# Patient Record
Sex: MALE | Race: ASIAN | NOT HISPANIC OR LATINO | URBAN - METROPOLITAN AREA
[De-identification: names, ages, dates, MRNs, and addresses within clinical notes are randomized per-mention and may not be internally consistent; named-entity substitution may affect disease eponyms.]

---

## 2017-01-06 ENCOUNTER — EMERGENCY (EMERGENCY)
Facility: HOSPITAL | Age: 77
LOS: 1 days | Discharge: HOME | End: 2017-01-06
Admitting: EMERGENCY MEDICINE
Payer: MEDICARE

## 2017-01-06 PROCEDURE — 82553 CREATINE MB FRACTION: CPT

## 2017-01-06 PROCEDURE — 93005 ELECTROCARDIOGRAM TRACING: CPT

## 2017-01-06 PROCEDURE — 93010 ELECTROCARDIOGRAM REPORT: CPT

## 2017-01-06 PROCEDURE — 83880 ASSAY OF NATRIURETIC PEPTIDE: CPT

## 2017-01-06 PROCEDURE — 85730 THROMBOPLASTIN TIME PARTIAL: CPT

## 2017-01-06 PROCEDURE — 71020: CPT | Mod: 26

## 2017-01-06 PROCEDURE — 99284 EMERGENCY DEPT VISIT MOD MDM: CPT

## 2017-01-06 PROCEDURE — 99284 EMERGENCY DEPT VISIT MOD MDM: CPT | Mod: 25

## 2017-01-06 PROCEDURE — 71046 X-RAY EXAM CHEST 2 VIEWS: CPT

## 2017-01-06 PROCEDURE — 85610 PROTHROMBIN TIME: CPT

## 2017-01-06 PROCEDURE — 85027 COMPLETE CBC AUTOMATED: CPT

## 2017-01-06 PROCEDURE — 93010 ELECTROCARDIOGRAM REPORT: CPT | Mod: 77,76

## 2017-01-06 PROCEDURE — 81001 URINALYSIS AUTO W/SCOPE: CPT

## 2017-01-06 PROCEDURE — 84484 ASSAY OF TROPONIN QUANT: CPT

## 2017-01-06 PROCEDURE — 99285 EMERGENCY DEPT VISIT HI MDM: CPT

## 2017-01-06 PROCEDURE — 82550 ASSAY OF CK (CPK): CPT

## 2017-01-06 PROCEDURE — 80053 COMPREHEN METABOLIC PANEL: CPT

## 2017-01-13 ENCOUNTER — NON-APPOINTMENT (OUTPATIENT)
Age: 77
End: 2017-01-13

## 2017-01-13 ENCOUNTER — APPOINTMENT (OUTPATIENT)
Dept: CARDIOLOGY | Facility: CLINIC | Age: 77
End: 2017-01-13

## 2017-01-13 VITALS
BODY MASS INDEX: 21.21 KG/M2 | SYSTOLIC BLOOD PRESSURE: 145 MMHG | DIASTOLIC BLOOD PRESSURE: 66 MMHG | WEIGHT: 132 LBS | HEIGHT: 66 IN | HEART RATE: 60 BPM

## 2017-01-13 RX ORDER — METOPROLOL SUCCINATE 25 MG/1
25 TABLET, EXTENDED RELEASE ORAL
Qty: 1 | Refills: 1 | Status: ACTIVE | COMMUNITY
Start: 1900-01-01 | End: 1900-01-01

## 2017-02-02 ENCOUNTER — EMERGENCY (EMERGENCY)
Facility: HOSPITAL | Age: 77
LOS: 1 days | Discharge: HOME | End: 2017-02-02
Admitting: EMERGENCY MEDICINE
Payer: MEDICARE

## 2017-02-02 LAB
ALBUMIN SERPL ELPH-MCNC: 3.8 G/DL — SIGNIFICANT CHANGE UP (ref 3.3–5)
ALP SERPL-CCNC: 54 U/L — SIGNIFICANT CHANGE UP (ref 30–120)
ALT FLD-CCNC: <10 U/L DA — LOW (ref 10–60)
ANION GAP SERPL CALC-SCNC: 5 MMOL/L — SIGNIFICANT CHANGE UP (ref 5–17)
APTT BLD: 31 SEC — SIGNIFICANT CHANGE UP (ref 27.5–37.4)
AST SERPL-CCNC: 22 U/L — SIGNIFICANT CHANGE UP (ref 10–40)
BASOPHILS # BLD AUTO: 0.1 K/UL — SIGNIFICANT CHANGE UP (ref 0–0.2)
BASOPHILS NFR BLD AUTO: 1 % — SIGNIFICANT CHANGE UP (ref 0–2)
BILIRUB SERPL-MCNC: 0.7 MG/DL — SIGNIFICANT CHANGE UP (ref 0.2–1.2)
BUN SERPL-MCNC: 15 MG/DL — SIGNIFICANT CHANGE UP (ref 7–23)
CALCIUM SERPL-MCNC: 9.3 MG/DL — SIGNIFICANT CHANGE UP (ref 8.4–10.5)
CHLORIDE SERPL-SCNC: 99 MMOL/L — SIGNIFICANT CHANGE UP (ref 96–108)
CK MB CFR SERPL CALC: 0.6 NG/ML — SIGNIFICANT CHANGE UP (ref 0–3.6)
CK SERPL-CCNC: 93 U/L — SIGNIFICANT CHANGE UP (ref 39–308)
CO2 SERPL-SCNC: 32 MMOL/L — HIGH (ref 22–31)
CREAT SERPL-MCNC: 1.21 MG/DL — SIGNIFICANT CHANGE UP (ref 0.5–1.3)
EOSINOPHIL # BLD AUTO: 0.4 K/UL — SIGNIFICANT CHANGE UP (ref 0–0.5)
EOSINOPHIL NFR BLD AUTO: 4.4 % — SIGNIFICANT CHANGE UP (ref 0–6)
FT4I SERPL CALC-MCNC: 3 FTI% — SIGNIFICANT CHANGE UP (ref 1.4–4.8)
FT4I SERPL CALC-MCNC: 8.8 INDEX — SIGNIFICANT CHANGE UP (ref 4.6–15.4)
GLUCOSE SERPL-MCNC: 263 MG/DL — HIGH (ref 70–99)
HCT VFR BLD CALC: 38.7 % — LOW (ref 39–50)
HGB BLD-MCNC: 12.6 G/DL — LOW (ref 13–17)
INR BLD: 1.18 RATIO — HIGH (ref 0.88–1.16)
LYMPHOCYTES # BLD AUTO: 3.6 K/UL — HIGH (ref 1–3.3)
LYMPHOCYTES # BLD AUTO: 40.5 % — SIGNIFICANT CHANGE UP (ref 13–44)
MCHC RBC-ENTMCNC: 28 PG — SIGNIFICANT CHANGE UP (ref 27–34)
MCHC RBC-ENTMCNC: 32.7 GM/DL — SIGNIFICANT CHANGE UP (ref 32–36)
MCV RBC AUTO: 85.6 FL — SIGNIFICANT CHANGE UP (ref 80–100)
MONOCYTES # BLD AUTO: 1 K/UL — HIGH (ref 0–0.9)
MONOCYTES NFR BLD AUTO: 11.2 % — SIGNIFICANT CHANGE UP (ref 2–14)
NEUTROPHILS # BLD AUTO: 3.8 K/UL — SIGNIFICANT CHANGE UP (ref 1.8–7.4)
NEUTROPHILS NFR BLD AUTO: 42.9 % — LOW (ref 43–77)
PLATELET # BLD AUTO: 271 K/UL — SIGNIFICANT CHANGE UP (ref 150–400)
POTASSIUM SERPL-MCNC: 4.7 MMOL/L — SIGNIFICANT CHANGE UP (ref 3.5–5.3)
POTASSIUM SERPL-SCNC: 4.7 MMOL/L — SIGNIFICANT CHANGE UP (ref 3.5–5.3)
PROT SERPL-MCNC: 7.2 G/DL — SIGNIFICANT CHANGE UP (ref 6–8.3)
PROTHROM AB SERPL-ACNC: 13.2 SEC — HIGH (ref 10–13.1)
RBC # BLD: 4.52 M/UL — SIGNIFICANT CHANGE UP (ref 4.2–5.8)
RBC # FLD: 12.4 % — SIGNIFICANT CHANGE UP (ref 10.3–14.5)
SODIUM SERPL-SCNC: 136 MMOL/L — SIGNIFICANT CHANGE UP (ref 135–145)
T3 SERPL-MCNC: 98 NG/DL — SIGNIFICANT CHANGE UP (ref 80–200)
T3/T3 UPTAKE INDEX SERPL-RTO: 39 % — SIGNIFICANT CHANGE UP (ref 24–39)
T4 AB SER-ACNC: 7.7 UG/DL — SIGNIFICANT CHANGE UP (ref 4.6–12)
T4 FREE SERPL-MCNC: 1.7 NG/DL — SIGNIFICANT CHANGE UP (ref 0.9–1.8)
T4/T3 UPTAKE INDEX SERPL: 0.87 INDEX — SIGNIFICANT CHANGE UP (ref 0.8–1.3)
TROPONIN I SERPL-MCNC: 0 NG/ML — LOW (ref 0.02–0.06)
TSH SERPL-MCNC: 0.05 UIU/ML — LOW (ref 0.27–4.2)
WBC # BLD: 8.8 K/UL — SIGNIFICANT CHANGE UP (ref 3.8–10.5)
WBC # FLD AUTO: 8.8 K/UL — SIGNIFICANT CHANGE UP (ref 3.8–10.5)

## 2017-02-02 PROCEDURE — 85027 COMPLETE CBC AUTOMATED: CPT

## 2017-02-02 PROCEDURE — 99284 EMERGENCY DEPT VISIT MOD MDM: CPT

## 2017-02-02 PROCEDURE — 85610 PROTHROMBIN TIME: CPT

## 2017-02-02 PROCEDURE — 82553 CREATINE MB FRACTION: CPT

## 2017-02-02 PROCEDURE — 84436 ASSAY OF TOTAL THYROXINE: CPT

## 2017-02-02 PROCEDURE — 71045 X-RAY EXAM CHEST 1 VIEW: CPT

## 2017-02-02 PROCEDURE — 84479 ASSAY OF THYROID (T3 OR T4): CPT

## 2017-02-02 PROCEDURE — 84443 ASSAY THYROID STIM HORMONE: CPT

## 2017-02-02 PROCEDURE — 99284 EMERGENCY DEPT VISIT MOD MDM: CPT | Mod: 25

## 2017-02-02 PROCEDURE — 84480 ASSAY TRIIODOTHYRONINE (T3): CPT

## 2017-02-02 PROCEDURE — 93005 ELECTROCARDIOGRAM TRACING: CPT

## 2017-02-02 PROCEDURE — 85730 THROMBOPLASTIN TIME PARTIAL: CPT

## 2017-02-02 PROCEDURE — 93010 ELECTROCARDIOGRAM REPORT: CPT

## 2017-02-02 PROCEDURE — 71010: CPT | Mod: 26

## 2017-02-02 PROCEDURE — 84439 ASSAY OF FREE THYROXINE: CPT

## 2017-02-02 PROCEDURE — 82550 ASSAY OF CK (CPK): CPT

## 2017-02-02 PROCEDURE — 80053 COMPREHEN METABOLIC PANEL: CPT

## 2017-02-02 PROCEDURE — 84484 ASSAY OF TROPONIN QUANT: CPT

## 2017-02-03 ENCOUNTER — NON-APPOINTMENT (OUTPATIENT)
Age: 77
End: 2017-02-03

## 2017-02-03 ENCOUNTER — APPOINTMENT (OUTPATIENT)
Dept: CARDIOLOGY | Facility: CLINIC | Age: 77
End: 2017-02-03

## 2017-02-03 VITALS
SYSTOLIC BLOOD PRESSURE: 160 MMHG | WEIGHT: 132 LBS | HEIGHT: 66 IN | BODY MASS INDEX: 21.21 KG/M2 | DIASTOLIC BLOOD PRESSURE: 70 MMHG | HEART RATE: 60 BPM | OXYGEN SATURATION: 100 %

## 2017-02-28 ENCOUNTER — APPOINTMENT (OUTPATIENT)
Dept: CARDIOLOGY | Facility: CLINIC | Age: 77
End: 2017-02-28

## 2017-05-18 ENCOUNTER — NON-APPOINTMENT (OUTPATIENT)
Age: 77
End: 2017-05-18

## 2017-05-18 ENCOUNTER — APPOINTMENT (OUTPATIENT)
Dept: CARDIOLOGY | Facility: CLINIC | Age: 77
End: 2017-05-18

## 2017-05-18 VITALS
HEART RATE: 60 BPM | DIASTOLIC BLOOD PRESSURE: 68 MMHG | WEIGHT: 134 LBS | HEIGHT: 66 IN | SYSTOLIC BLOOD PRESSURE: 145 MMHG | BODY MASS INDEX: 21.53 KG/M2

## 2017-08-31 ENCOUNTER — APPOINTMENT (OUTPATIENT)
Dept: CARDIOLOGY | Facility: CLINIC | Age: 77
End: 2017-08-31
Payer: MEDICARE

## 2017-08-31 ENCOUNTER — NON-APPOINTMENT (OUTPATIENT)
Age: 77
End: 2017-08-31

## 2017-08-31 VITALS
OXYGEN SATURATION: 95 % | DIASTOLIC BLOOD PRESSURE: 54 MMHG | HEIGHT: 66 IN | SYSTOLIC BLOOD PRESSURE: 91 MMHG | HEART RATE: 58 BPM | BODY MASS INDEX: 21.53 KG/M2 | WEIGHT: 134 LBS

## 2017-08-31 VITALS — SYSTOLIC BLOOD PRESSURE: 100 MMHG | DIASTOLIC BLOOD PRESSURE: 50 MMHG

## 2017-08-31 DIAGNOSIS — I10 ESSENTIAL (PRIMARY) HYPERTENSION: ICD-10-CM

## 2017-08-31 PROCEDURE — 99214 OFFICE O/P EST MOD 30 MIN: CPT | Mod: 25

## 2017-08-31 PROCEDURE — 93000 ELECTROCARDIOGRAM COMPLETE: CPT

## 2017-09-12 ENCOUNTER — APPOINTMENT (OUTPATIENT)
Dept: CARDIOLOGY | Facility: CLINIC | Age: 77
End: 2017-09-12
Payer: MEDICARE

## 2017-09-12 PROCEDURE — 93280 PM DEVICE PROGR EVAL DUAL: CPT

## 2017-12-19 ENCOUNTER — APPOINTMENT (OUTPATIENT)
Dept: CARDIOLOGY | Facility: CLINIC | Age: 77
End: 2017-12-19
Payer: MEDICARE

## 2017-12-19 PROCEDURE — 93280 PM DEVICE PROGR EVAL DUAL: CPT

## 2017-12-22 ENCOUNTER — NON-APPOINTMENT (OUTPATIENT)
Age: 77
End: 2017-12-22

## 2017-12-22 ENCOUNTER — APPOINTMENT (OUTPATIENT)
Dept: CARDIOLOGY | Facility: CLINIC | Age: 77
End: 2017-12-22
Payer: MEDICARE

## 2017-12-22 VITALS
SYSTOLIC BLOOD PRESSURE: 152 MMHG | WEIGHT: 130 LBS | HEIGHT: 66 IN | DIASTOLIC BLOOD PRESSURE: 75 MMHG | BODY MASS INDEX: 20.89 KG/M2 | HEART RATE: 60 BPM

## 2017-12-22 DIAGNOSIS — E11.9 TYPE 2 DIABETES MELLITUS W/OUT COMPLICATIONS: ICD-10-CM

## 2017-12-22 DIAGNOSIS — I63.9 CEREBRAL INFARCTION, UNSPECIFIED: ICD-10-CM

## 2017-12-22 PROCEDURE — 99214 OFFICE O/P EST MOD 30 MIN: CPT | Mod: 25

## 2017-12-22 PROCEDURE — 93000 ELECTROCARDIOGRAM COMPLETE: CPT

## 2018-03-20 ENCOUNTER — APPOINTMENT (OUTPATIENT)
Dept: CARDIOLOGY | Facility: CLINIC | Age: 78
End: 2018-03-20

## 2018-04-17 ENCOUNTER — NON-APPOINTMENT (OUTPATIENT)
Age: 78
End: 2018-04-17

## 2018-04-17 ENCOUNTER — APPOINTMENT (OUTPATIENT)
Dept: CARDIOLOGY | Facility: CLINIC | Age: 78
End: 2018-04-17
Payer: MEDICARE

## 2018-04-17 VITALS — SYSTOLIC BLOOD PRESSURE: 154 MMHG | OXYGEN SATURATION: 98 % | DIASTOLIC BLOOD PRESSURE: 73 MMHG | HEART RATE: 58 BPM

## 2018-04-17 VITALS — BODY MASS INDEX: 22.02 KG/M2 | WEIGHT: 137 LBS | HEIGHT: 66 IN

## 2018-04-17 DIAGNOSIS — R55 SYNCOPE AND COLLAPSE: ICD-10-CM

## 2018-04-17 DIAGNOSIS — I95.1 ORTHOSTATIC HYPOTENSION: ICD-10-CM

## 2018-04-17 DIAGNOSIS — I25.10 ATHEROSCLEROTIC HEART DISEASE OF NATIVE CORONARY ARTERY W/OUT ANGINA PECTORIS: ICD-10-CM

## 2018-04-17 DIAGNOSIS — G20 PARKINSON'S DISEASE: ICD-10-CM

## 2018-04-17 PROCEDURE — 93000 ELECTROCARDIOGRAM COMPLETE: CPT

## 2018-04-17 PROCEDURE — 99214 OFFICE O/P EST MOD 30 MIN: CPT | Mod: 25

## 2018-06-20 ENCOUNTER — APPOINTMENT (OUTPATIENT)
Dept: CARDIOLOGY | Facility: CLINIC | Age: 78
End: 2018-06-20
Payer: MEDICARE

## 2018-06-20 DIAGNOSIS — I47.2 VENTRICULAR TACHYCARDIA: ICD-10-CM

## 2018-06-20 PROCEDURE — 93280 PM DEVICE PROGR EVAL DUAL: CPT

## 2018-06-25 LAB
ALBUMIN SERPL ELPH-MCNC: 4 G/DL
ALP BLD-CCNC: 44 U/L
ALT SERPL-CCNC: 7 U/L
ANION GAP SERPL CALC-SCNC: 17 MMOL/L
AST SERPL-CCNC: 21 U/L
BASOPHILS # BLD AUTO: 0.04 K/UL
BASOPHILS NFR BLD AUTO: 0.5 %
BILIRUB SERPL-MCNC: 0.6 MG/DL
BUN SERPL-MCNC: 15 MG/DL
CALCIUM SERPL-MCNC: 9.3 MG/DL
CHLORIDE SERPL-SCNC: 97 MMOL/L
CO2 SERPL-SCNC: 21 MMOL/L
CREAT SERPL-MCNC: 0.99 MG/DL
EOSINOPHIL # BLD AUTO: 0.26 K/UL
EOSINOPHIL NFR BLD AUTO: 3 %
GLUCOSE SERPL-MCNC: 327 MG/DL
HCT VFR BLD CALC: 41.4 %
HGB BLD-MCNC: 13.3 G/DL
IMM GRANULOCYTES NFR BLD AUTO: 0.2 %
LYMPHOCYTES # BLD AUTO: 3.05 K/UL
LYMPHOCYTES NFR BLD AUTO: 35.4 %
MAGNESIUM SERPL-MCNC: 1.7 MG/DL
MAN DIFF?: NORMAL
MCHC RBC-ENTMCNC: 27.3 PG
MCHC RBC-ENTMCNC: 32.1 GM/DL
MCV RBC AUTO: 85 FL
MONOCYTES # BLD AUTO: 0.6 K/UL
MONOCYTES NFR BLD AUTO: 7 %
NEUTROPHILS # BLD AUTO: 4.65 K/UL
NEUTROPHILS NFR BLD AUTO: 53.9 %
PLATELET # BLD AUTO: 318 K/UL
POTASSIUM SERPL-SCNC: 4.3 MMOL/L
PROT SERPL-MCNC: 7.4 G/DL
RBC # BLD: 4.87 M/UL
RBC # FLD: 13.8 %
SODIUM SERPL-SCNC: 135 MMOL/L
T3 SERPL-MCNC: 92 NG/DL
T4 SERPL-MCNC: 8.1 UG/DL
TSH SERPL-ACNC: 0.06 UIU/ML
WBC # FLD AUTO: 8.62 K/UL

## 2018-06-28 ENCOUNTER — APPOINTMENT (OUTPATIENT)
Dept: CARDIOLOGY | Facility: CLINIC | Age: 78
End: 2018-06-28
Payer: MEDICARE

## 2018-06-28 PROCEDURE — 93306 TTE W/DOPPLER COMPLETE: CPT

## 2018-07-19 ENCOUNTER — APPOINTMENT (OUTPATIENT)
Dept: CARDIOLOGY | Facility: CLINIC | Age: 78
End: 2018-07-19
Payer: MEDICARE

## 2018-07-19 LAB — GLUCOSE SERPL-MCNC: 354

## 2018-07-19 PROCEDURE — A9500: CPT

## 2018-07-19 PROCEDURE — 93015 CV STRESS TEST SUPVJ I&R: CPT

## 2018-07-19 PROCEDURE — 78452 HT MUSCLE IMAGE SPECT MULT: CPT

## 2018-07-26 ENCOUNTER — APPOINTMENT (OUTPATIENT)
Dept: CARDIOLOGY | Facility: CLINIC | Age: 78
End: 2018-07-26

## 2018-07-31 ENCOUNTER — APPOINTMENT (OUTPATIENT)
Dept: CARDIOLOGY | Facility: CLINIC | Age: 78
End: 2018-07-31

## 2018-09-24 ENCOUNTER — EMERGENCY (EMERGENCY)
Facility: HOSPITAL | Age: 78
LOS: 1 days | Discharge: HOME | End: 2018-09-24
Attending: EMERGENCY MEDICINE | Admitting: EMERGENCY MEDICINE
Payer: MEDICARE

## 2018-09-24 VITALS
HEART RATE: 60 BPM | RESPIRATION RATE: 16 BRPM | DIASTOLIC BLOOD PRESSURE: 71 MMHG | TEMPERATURE: 98 F | OXYGEN SATURATION: 95 % | SYSTOLIC BLOOD PRESSURE: 134 MMHG

## 2018-09-24 VITALS
RESPIRATION RATE: 16 BRPM | DIASTOLIC BLOOD PRESSURE: 69 MMHG | TEMPERATURE: 97 F | WEIGHT: 139.99 LBS | OXYGEN SATURATION: 98 % | HEART RATE: 58 BPM | HEIGHT: 67 IN | SYSTOLIC BLOOD PRESSURE: 148 MMHG

## 2018-09-24 LAB
ALBUMIN SERPL ELPH-MCNC: 3.5 G/DL — SIGNIFICANT CHANGE UP (ref 3.3–5)
ALP SERPL-CCNC: 63 U/L — SIGNIFICANT CHANGE UP (ref 30–120)
ALT FLD-CCNC: 13 U/L DA — SIGNIFICANT CHANGE UP (ref 10–60)
ANION GAP SERPL CALC-SCNC: 5 MMOL/L — SIGNIFICANT CHANGE UP (ref 5–17)
AST SERPL-CCNC: 16 U/L — SIGNIFICANT CHANGE UP (ref 10–40)
BASOPHILS # BLD AUTO: 0.04 K/UL — SIGNIFICANT CHANGE UP (ref 0–0.2)
BASOPHILS NFR BLD AUTO: 0.4 % — SIGNIFICANT CHANGE UP (ref 0–2)
BILIRUB SERPL-MCNC: 0.4 MG/DL — SIGNIFICANT CHANGE UP (ref 0.2–1.2)
BUN SERPL-MCNC: 14 MG/DL — SIGNIFICANT CHANGE UP (ref 7–23)
CALCIUM SERPL-MCNC: 9.2 MG/DL — SIGNIFICANT CHANGE UP (ref 8.4–10.5)
CHLORIDE SERPL-SCNC: 97 MMOL/L — SIGNIFICANT CHANGE UP (ref 96–108)
CO2 SERPL-SCNC: 28 MMOL/L — SIGNIFICANT CHANGE UP (ref 22–31)
CREAT SERPL-MCNC: 1.08 MG/DL — SIGNIFICANT CHANGE UP (ref 0.5–1.3)
EOSINOPHIL # BLD AUTO: 0.12 K/UL — SIGNIFICANT CHANGE UP (ref 0–0.5)
EOSINOPHIL NFR BLD AUTO: 1.2 % — SIGNIFICANT CHANGE UP (ref 0–6)
GLUCOSE SERPL-MCNC: 317 MG/DL — HIGH (ref 70–99)
HCT VFR BLD CALC: 42.1 % — SIGNIFICANT CHANGE UP (ref 39–50)
HGB BLD-MCNC: 14.2 G/DL — SIGNIFICANT CHANGE UP (ref 13–17)
IMM GRANULOCYTES NFR BLD AUTO: 0.3 % — SIGNIFICANT CHANGE UP (ref 0–1.5)
LYMPHOCYTES # BLD AUTO: 2.61 K/UL — SIGNIFICANT CHANGE UP (ref 1–3.3)
LYMPHOCYTES # BLD AUTO: 26.1 % — SIGNIFICANT CHANGE UP (ref 13–44)
MCHC RBC-ENTMCNC: 27.7 PG — SIGNIFICANT CHANGE UP (ref 27–34)
MCHC RBC-ENTMCNC: 33.7 GM/DL — SIGNIFICANT CHANGE UP (ref 32–36)
MCV RBC AUTO: 82.2 FL — SIGNIFICANT CHANGE UP (ref 80–100)
MONOCYTES # BLD AUTO: 1.41 K/UL — HIGH (ref 0–0.9)
MONOCYTES NFR BLD AUTO: 14.1 % — HIGH (ref 2–14)
NEUTROPHILS # BLD AUTO: 5.8 K/UL — SIGNIFICANT CHANGE UP (ref 1.8–7.4)
NEUTROPHILS NFR BLD AUTO: 57.9 % — SIGNIFICANT CHANGE UP (ref 43–77)
PLATELET # BLD AUTO: 360 K/UL — SIGNIFICANT CHANGE UP (ref 150–400)
POTASSIUM SERPL-MCNC: 4.5 MMOL/L — SIGNIFICANT CHANGE UP (ref 3.5–5.3)
POTASSIUM SERPL-SCNC: 4.5 MMOL/L — SIGNIFICANT CHANGE UP (ref 3.5–5.3)
PROT SERPL-MCNC: 7.8 G/DL — SIGNIFICANT CHANGE UP (ref 6–8.3)
RBC # BLD: 5.12 M/UL — SIGNIFICANT CHANGE UP (ref 4.2–5.8)
RBC # FLD: 12.6 % — SIGNIFICANT CHANGE UP (ref 10.3–14.5)
SODIUM SERPL-SCNC: 130 MMOL/L — LOW (ref 135–145)
WBC # BLD: 10.01 K/UL — SIGNIFICANT CHANGE UP (ref 3.8–10.5)
WBC # FLD AUTO: 10.01 K/UL — SIGNIFICANT CHANGE UP (ref 3.8–10.5)

## 2018-09-24 PROCEDURE — 70450 CT HEAD/BRAIN W/O DYE: CPT | Mod: 26

## 2018-09-24 PROCEDURE — 80053 COMPREHEN METABOLIC PANEL: CPT

## 2018-09-24 PROCEDURE — 36415 COLL VENOUS BLD VENIPUNCTURE: CPT

## 2018-09-24 PROCEDURE — 72125 CT NECK SPINE W/O DYE: CPT | Mod: 26

## 2018-09-24 PROCEDURE — 72125 CT NECK SPINE W/O DYE: CPT

## 2018-09-24 PROCEDURE — 85027 COMPLETE CBC AUTOMATED: CPT

## 2018-09-24 PROCEDURE — 71045 X-RAY EXAM CHEST 1 VIEW: CPT

## 2018-09-24 PROCEDURE — 99284 EMERGENCY DEPT VISIT MOD MDM: CPT | Mod: 25

## 2018-09-24 PROCEDURE — 73521 X-RAY EXAM HIPS BI 2 VIEWS: CPT | Mod: 26

## 2018-09-24 PROCEDURE — 71045 X-RAY EXAM CHEST 1 VIEW: CPT | Mod: 26

## 2018-09-24 PROCEDURE — 73521 X-RAY EXAM HIPS BI 2 VIEWS: CPT

## 2018-09-24 PROCEDURE — 70450 CT HEAD/BRAIN W/O DYE: CPT

## 2018-09-24 PROCEDURE — 99284 EMERGENCY DEPT VISIT MOD MDM: CPT

## 2018-09-24 RX ORDER — CARBIDOPA AND LEVODOPA 25; 100 MG/1; MG/1
1 TABLET ORAL ONCE
Qty: 0 | Refills: 0 | Status: COMPLETED | OUTPATIENT
Start: 2018-09-24 | End: 2018-09-24

## 2018-09-24 RX ORDER — AMOXICILLIN 250 MG/5ML
1 SUSPENSION, RECONSTITUTED, ORAL (ML) ORAL
Qty: 21 | Refills: 0 | OUTPATIENT
Start: 2018-09-24 | End: 2018-09-30

## 2018-09-24 RX ADMIN — CARBIDOPA AND LEVODOPA 1 TABLET(S): 25; 100 TABLET ORAL at 15:27

## 2018-09-24 NOTE — ED PROVIDER NOTE - CONSTITUTIONAL, MLM
- - - I have personally seen and examined this patient.  I have fully participated in the care of this patient. I have reviewed all pertinent clinical information, including history, physical exam, plan and the Resident’s note and agree except as noted.

## 2018-09-24 NOTE — ED PROVIDER NOTE - OBJECTIVE STATEMENT
79 y/o M pt with hx of Parkinson's Disease, HTN, DM, CAD, CABG, and hypothyroidism   BIBA from home presents to the ED c/o head injury s/p trip and fall today as per wife. Wife states that she had left the room momentarily and the pt attempted to get up and lost his balance and fell. The aide saw the pt fall and hit his head on the wooden floor. Wife states that he was awake and did not have LOC when she found him. She states that he has Parkinson's Disease, which has been rapidly deteriorates over the past month. Wife states that he has been coughing at night for the past 3 days also, she suspects he may have gotten sick because she is recovering from a cold. Unable to obtain hx from pt as he is non-verbal from the Parkinson's Disease.

## 2018-09-24 NOTE — ED ADULT NURSE NOTE - OBJECTIVE STATEMENT
Pt BIBA for fall. Pt hx of advance Parkinson's disease. Pt reported fell while trying to get up a chair. No LOC

## 2018-09-24 NOTE — ED PROVIDER NOTE - NS_ ATTENDINGSCRIBEDETAILS _ED_A_ED_FT
Pt is a 79 yo male who presents to the ED with a cc of fall.  PMHx of HTN, CAD, DM, HLD, hypothyroidism, Parkinson's disease.  Pt has advanced Parkinson's disease and is nonverbal with unsteady gait.  Pt is unable to provide history.  Per history wife reports that pt was left in a room for a short time and apparently attempted to stand, lost his balance and fell to the ground.  Wife reports that pt had no LOC.  He is not currently on blood thinners.  Wife further reports that she was recently diagnosed with bronchitis and that she has noted that over the last 3 nights pt has been coughing during the night.  Denies noting any coughing during the day.  Pt has had no known fevers.

## 2018-09-24 NOTE — ED ADULT NURSE NOTE - CHIEF COMPLAINT QUOTE
78 yr. old male s/p fall.  Abrasion to right forehead noted. Spoke with CT department regarding ordered CTA of chest. 18 gauge has been placed to Rt wrist successfully. CT states unable to take patient at this time. States will call this nurse as soon possible when ready for pt to come down. Will continue to monitor.

## 2018-09-24 NOTE — ED PROVIDER NOTE - ENMT, MLM
Airway patent, Nasal mucosa clear. Mouth with normal mucosa. TMs clear no septal hematoma   2 cm abrasion noted to right frontal region

## 2018-09-24 NOTE — ED PROVIDER NOTE - MEDICAL DECISION MAKING DETAILS
Pt with head injury s/p fall.  H/o unsteady gait secondary to Parkinson disease.  Will obtain imaging.  Wife reports increased cough at home and states that she was recently diagnosed with bronchitis.  Will obtain screening labs and check chest x-ray as well

## 2018-09-24 NOTE — ED PROVIDER NOTE - PROGRESS NOTE DETAILS
Results of labs and images reviewed with pt wife at bedside.  No acute abnormality noted.  Pt PMD was in the ED case reviewed.  In agreement with discharge home at this time.  In light of pt wife recent bronchitis will cover with po Amoxicillin per PMD request

## 2018-09-24 NOTE — ED ADULT NURSE REASSESSMENT NOTE - NS ED NURSE REASSESS COMMENT FT1
Received pt. resting in bed.  Side rails up.  Wife at bedside.  No c/o pain.
Pt back from Radiology Department

## 2018-09-24 NOTE — ED PROVIDER NOTE - PMH
Benign essential hypertension    CAD (coronary artery disease) of artery bypass graft    Diabetes mellitus    Dyslipidemia    Hypothyroidism    Parkinson's disease

## 2018-09-24 NOTE — ED ADULT NURSE NOTE - NSIMPLEMENTINTERV_GEN_ALL_ED
Implemented All Fall with Harm Risk Interventions:  [place] to call system. Call bell, personal items and telephone within reach. Instruct patient to call for assistance. Room bathroom lighting operational. Non-slip footwear when patient is off stretcher. Physically safe environment: no spills, clutter or unnecessary equipment. Stretcher in lowest position, wheels locked, appropriate side rails in place. Provide visual cue, wrist band, yellow gown, etc. Monitor gait and stability. Monitor for mental status changes and reorient to person, place, and time. Review medications for side effects contributing to fall risk. Reinforce activity limits and safety measures with patient and family. Provide visual clues: red socks.

## 2018-09-24 NOTE — ED PROVIDER NOTE - CARE PLAN
Principal Discharge DX:	Bronchitis  Assessment and plan of treatment:	Return to the ED for any new or worsening symptoms  Take your mediation as prescribed   Amoxicillin per label instructions please finish the prescription  Advance activity as tolerated   Follow up with your PMD within the week for a recheck   Advance activity as tolerated  Secondary Diagnosis:	Fall  Secondary Diagnosis:	Abrasion

## 2018-09-24 NOTE — ED PROVIDER NOTE - PLAN OF CARE
Return to the ED for any new or worsening symptoms  Take your mediation as prescribed   Amoxicillin per label instructions please finish the prescription  Advance activity as tolerated   Follow up with your PMD within the week for a recheck   Advance activity as tolerated

## 2018-11-25 ENCOUNTER — TRANSCRIPTION ENCOUNTER (OUTPATIENT)
Age: 78
End: 2018-11-25

## 2018-11-25 ENCOUNTER — INPATIENT (INPATIENT)
Facility: HOSPITAL | Age: 78
LOS: 11 days | Discharge: HOME | DRG: 870 | End: 2018-12-07
Attending: INTERNAL MEDICINE | Admitting: INTERNAL MEDICINE
Payer: MEDICARE

## 2018-11-25 VITALS
SYSTOLIC BLOOD PRESSURE: 134 MMHG | RESPIRATION RATE: 47 BRPM | HEIGHT: 67 IN | TEMPERATURE: 106 F | WEIGHT: 132.94 LBS | HEART RATE: 131 BPM | OXYGEN SATURATION: 96 % | DIASTOLIC BLOOD PRESSURE: 94 MMHG

## 2018-11-25 DIAGNOSIS — G20 PARKINSON'S DISEASE: ICD-10-CM

## 2018-11-25 DIAGNOSIS — E11.9 TYPE 2 DIABETES MELLITUS WITHOUT COMPLICATIONS: ICD-10-CM

## 2018-11-25 DIAGNOSIS — J18.9 PNEUMONIA, UNSPECIFIED ORGANISM: ICD-10-CM

## 2018-11-25 DIAGNOSIS — A41.9 SEPSIS, UNSPECIFIED ORGANISM: ICD-10-CM

## 2018-11-25 DIAGNOSIS — E03.9 HYPOTHYROIDISM, UNSPECIFIED: ICD-10-CM

## 2018-11-25 DIAGNOSIS — J96.01 ACUTE RESPIRATORY FAILURE WITH HYPOXIA: ICD-10-CM

## 2018-11-25 LAB
ALBUMIN SERPL ELPH-MCNC: 1.7 G/DL — LOW (ref 3.3–5)
ALP SERPL-CCNC: 41 U/L — SIGNIFICANT CHANGE UP (ref 30–120)
ALT FLD-CCNC: 12 U/L DA — SIGNIFICANT CHANGE UP (ref 10–60)
ANION GAP SERPL CALC-SCNC: 16 MMOL/L — SIGNIFICANT CHANGE UP (ref 5–17)
APTT BLD: 31.3 SEC — SIGNIFICANT CHANGE UP (ref 27.5–36.3)
AST SERPL-CCNC: 88 U/L — HIGH (ref 10–40)
BASE EXCESS BLDA CALC-SCNC: -6.4 MMOL/L — LOW (ref -2–2)
BASOPHILS # BLD AUTO: 0.02 K/UL — SIGNIFICANT CHANGE UP (ref 0–0.2)
BASOPHILS NFR BLD AUTO: 0.3 % — SIGNIFICANT CHANGE UP (ref 0–2)
BILIRUB SERPL-MCNC: 0.8 MG/DL — SIGNIFICANT CHANGE UP (ref 0.2–1.2)
BLOOD GAS SOURCE: SIGNIFICANT CHANGE UP
BUN SERPL-MCNC: 27 MG/DL — HIGH (ref 7–23)
CALCIUM SERPL-MCNC: 7.2 MG/DL — LOW (ref 8.4–10.5)
CHLORIDE SERPL-SCNC: 91 MMOL/L — LOW (ref 96–108)
CO2 SERPL-SCNC: 17 MMOL/L — LOW (ref 22–31)
CREAT SERPL-MCNC: 1.52 MG/DL — HIGH (ref 0.5–1.3)
EOSINOPHIL # BLD AUTO: 0 K/UL — SIGNIFICANT CHANGE UP (ref 0–0.5)
EOSINOPHIL NFR BLD AUTO: 0 % — SIGNIFICANT CHANGE UP (ref 0–6)
GLUCOSE SERPL-MCNC: 697 MG/DL — CRITICAL HIGH (ref 70–99)
HCO3 BLDA-SCNC: 20 MMOL/L — LOW (ref 21–29)
HCT VFR BLD CALC: 45.8 % — SIGNIFICANT CHANGE UP (ref 39–50)
HGB BLD-MCNC: 14.9 G/DL — SIGNIFICANT CHANGE UP (ref 13–17)
HOROWITZ INDEX BLDA+IHG-RTO: 100 — SIGNIFICANT CHANGE UP
IMM GRANULOCYTES NFR BLD AUTO: 0.2 % — SIGNIFICANT CHANGE UP (ref 0–1.5)
INR BLD: 1.7 RATIO — HIGH (ref 0.88–1.16)
LACTATE SERPL-SCNC: 5 MMOL/L — CRITICAL HIGH (ref 0.7–2)
LYMPHOCYTES # BLD AUTO: 0.72 K/UL — LOW (ref 1–3.3)
LYMPHOCYTES # BLD AUTO: 12.3 % — LOW (ref 13–44)
MCHC RBC-ENTMCNC: 27.7 PG — SIGNIFICANT CHANGE UP (ref 27–34)
MCHC RBC-ENTMCNC: 32.5 GM/DL — SIGNIFICANT CHANGE UP (ref 32–36)
MCV RBC AUTO: 85.1 FL — SIGNIFICANT CHANGE UP (ref 80–100)
MONOCYTES # BLD AUTO: 0.66 K/UL — SIGNIFICANT CHANGE UP (ref 0–0.9)
MONOCYTES NFR BLD AUTO: 11.3 % — SIGNIFICANT CHANGE UP (ref 2–14)
NEUTROPHILS # BLD AUTO: 4.44 K/UL — SIGNIFICANT CHANGE UP (ref 1.8–7.4)
NEUTROPHILS NFR BLD AUTO: 75.9 % — SIGNIFICANT CHANGE UP (ref 43–77)
NRBC # BLD: 0 /100 WBCS — SIGNIFICANT CHANGE UP (ref 0–0)
PCO2 BLDA: 24 MMHG — LOW (ref 32–46)
PH BLD: 7.46 — HIGH (ref 7.35–7.45)
PLATELET # BLD AUTO: 425 K/UL — HIGH (ref 150–400)
PO2 BLDA: 466 MMHG — HIGH (ref 74–108)
POTASSIUM SERPL-MCNC: 3.7 MMOL/L — SIGNIFICANT CHANGE UP (ref 3.5–5.3)
POTASSIUM SERPL-SCNC: 3.7 MMOL/L — SIGNIFICANT CHANGE UP (ref 3.5–5.3)
PROT SERPL-MCNC: 4.9 G/DL — LOW (ref 6–8.3)
PROTHROM AB SERPL-ACNC: 18.8 SEC — HIGH (ref 10–12.9)
RAPID RVP RESULT: SIGNIFICANT CHANGE UP
RBC # BLD: 5.38 M/UL — SIGNIFICANT CHANGE UP (ref 4.2–5.8)
RBC # FLD: 13.2 % — SIGNIFICANT CHANGE UP (ref 10.3–14.5)
SAO2 % BLDA: 100 % — HIGH (ref 92–96)
SODIUM SERPL-SCNC: 124 MMOL/L — LOW (ref 135–145)
WBC # BLD: 5.85 K/UL — SIGNIFICANT CHANGE UP (ref 3.8–10.5)
WBC # FLD AUTO: 5.85 K/UL — SIGNIFICANT CHANGE UP (ref 3.8–10.5)

## 2018-11-25 PROCEDURE — 93010 ELECTROCARDIOGRAM REPORT: CPT

## 2018-11-25 PROCEDURE — 99291 CRITICAL CARE FIRST HOUR: CPT

## 2018-11-25 PROCEDURE — 71045 X-RAY EXAM CHEST 1 VIEW: CPT | Mod: 26

## 2018-11-25 PROCEDURE — 99223 1ST HOSP IP/OBS HIGH 75: CPT | Mod: AI

## 2018-11-25 RX ORDER — ACETAMINOPHEN 500 MG
650 TABLET ORAL EVERY 6 HOURS
Qty: 0 | Refills: 0 | Status: DISCONTINUED | OUTPATIENT
Start: 2018-11-25 | End: 2018-11-27

## 2018-11-25 RX ORDER — CEFTRIAXONE 500 MG/1
1 INJECTION, POWDER, FOR SOLUTION INTRAMUSCULAR; INTRAVENOUS ONCE
Qty: 0 | Refills: 0 | Status: COMPLETED | OUTPATIENT
Start: 2018-11-25 | End: 2018-11-25

## 2018-11-25 RX ORDER — SODIUM CHLORIDE 9 MG/ML
1000 INJECTION, SOLUTION INTRAVENOUS
Qty: 0 | Refills: 0 | Status: COMPLETED | OUTPATIENT
Start: 2018-11-25 | End: 2018-11-25

## 2018-11-25 RX ORDER — PIPERACILLIN AND TAZOBACTAM 4; .5 G/20ML; G/20ML
3.38 INJECTION, POWDER, LYOPHILIZED, FOR SOLUTION INTRAVENOUS EVERY 8 HOURS
Qty: 0 | Refills: 0 | Status: DISCONTINUED | OUTPATIENT
Start: 2018-11-25 | End: 2018-11-30

## 2018-11-25 RX ORDER — PIPERACILLIN AND TAZOBACTAM 4; .5 G/20ML; G/20ML
3.38 INJECTION, POWDER, LYOPHILIZED, FOR SOLUTION INTRAVENOUS ONCE
Qty: 0 | Refills: 0 | Status: COMPLETED | OUTPATIENT
Start: 2018-11-25 | End: 2018-11-25

## 2018-11-25 RX ORDER — ACETAMINOPHEN 500 MG
650 TABLET ORAL ONCE
Qty: 0 | Refills: 0 | Status: COMPLETED | OUTPATIENT
Start: 2018-11-25 | End: 2018-11-25

## 2018-11-25 RX ORDER — AZITHROMYCIN 500 MG/1
500 TABLET, FILM COATED ORAL ONCE
Qty: 0 | Refills: 0 | Status: COMPLETED | OUTPATIENT
Start: 2018-11-25 | End: 2018-11-25

## 2018-11-25 RX ORDER — SODIUM CHLORIDE 9 MG/ML
1000 INJECTION INTRAMUSCULAR; INTRAVENOUS; SUBCUTANEOUS ONCE
Qty: 0 | Refills: 0 | Status: COMPLETED | OUTPATIENT
Start: 2018-11-25 | End: 2018-11-25

## 2018-11-25 RX ADMIN — CEFTRIAXONE 1 GRAM(S): 500 INJECTION, POWDER, FOR SOLUTION INTRAMUSCULAR; INTRAVENOUS at 21:37

## 2018-11-25 RX ADMIN — SODIUM CHLORIDE 1000 MILLILITER(S): 9 INJECTION INTRAMUSCULAR; INTRAVENOUS; SUBCUTANEOUS at 23:37

## 2018-11-25 RX ADMIN — AZITHROMYCIN 255 MILLIGRAM(S): 500 TABLET, FILM COATED ORAL at 21:07

## 2018-11-25 RX ADMIN — AZITHROMYCIN 500 MILLIGRAM(S): 500 TABLET, FILM COATED ORAL at 21:37

## 2018-11-25 RX ADMIN — CEFTRIAXONE 100 GRAM(S): 500 INJECTION, POWDER, FOR SOLUTION INTRAMUSCULAR; INTRAVENOUS at 21:07

## 2018-11-25 RX ADMIN — PIPERACILLIN AND TAZOBACTAM 200 GRAM(S): 4; .5 INJECTION, POWDER, LYOPHILIZED, FOR SOLUTION INTRAVENOUS at 23:07

## 2018-11-25 RX ADMIN — SODIUM CHLORIDE 1000 MILLILITER(S): 9 INJECTION, SOLUTION INTRAVENOUS at 21:33

## 2018-11-25 RX ADMIN — SODIUM CHLORIDE 1000 MILLILITER(S): 9 INJECTION, SOLUTION INTRAVENOUS at 22:00

## 2018-11-25 RX ADMIN — SODIUM CHLORIDE 1000 MILLILITER(S): 9 INJECTION, SOLUTION INTRAVENOUS at 21:07

## 2018-11-25 RX ADMIN — Medication 650 MILLIGRAM(S): at 21:00

## 2018-11-25 RX ADMIN — Medication 650 MILLIGRAM(S): at 20:45

## 2018-11-25 NOTE — CONSULT NOTE ADULT - PROBLEM SELECTOR RECOMMENDATION 4
-will recheck accucheck and if remains hyperlycemic   -will start insulin drip, titrate per MICU protocol  - Q1H accuchecks if insulin started   -aggressive fluid hydration, goal UOP >0.5 cc/kg/hr  -obtain magnesium, phosphorous level, acetone but given low anion gap this is less likely DKA and more liekly sepsis induced hyperglycemia   -Diabetic education and counseling  -send HbA1C -will recheck accucheck and if remains hyperglycemic   -will start insulin drip, titrate per MICU protocol  - Q1H accuchecks if insulin started   -aggressive fluid hydration, goal UOP >0.5 cc/kg/hr  -obtain magnesium, phosphorous level, acetone but given low anion gap this is less likely DKA and more liekly sepsis induced hyperglycemia   -Diabetic education and counseling  -send HbA1C

## 2018-11-25 NOTE — CONSULT NOTE ADULT - SUBJECTIVE AND OBJECTIVE BOX
78y  Male  No Known Allergies    CC: Patient is a 78y old  Male who presents with a chief complaint of lethargy and SOB     HPI:   This is an 78 year old male who lives at home, wife at bedside states he has been weak with decreased appetite for past few days but today started with lethargy and SOB tonight with fever at home. In the emergency room he presented  lethergic ( opens eyes to voice but not following commands) hypoxic on pulseox and SOB with increased work of breathing, accessory muscle use and respiratory rate in the mid 30's,  so he was placed on non invasive ventilation ( BIPAP).  Post BIAPA abg showing respiratory alkalosis with PCo2 455 on 100% Fio2. I performed POCUS finding his IVC to be non compressible with respiratory variation indicating he is volume overloaded at this time. He has currently received 2 liters of normal saline and at 60.03 Kg his above his 30cc/kg of fluid resuscitation for sepsis guidelines.   He remains febrile and has lactate of 5.0 but his systolic blood is currently preserved in the 124-135 range but if he becomes hypotensive will have low threshold for IV pressors to maintain MAP greater than 65 given his current volume status. His acchucheck has been reported to be 655 given serum glucose of 315 will repeat but if hypergalycemia persists witll start inusilin drip for sepsis induced hyperglycemia , this is unlikely DKA given his anion gap is 5. I recommend admission to SPCU for acute hyoxic respiratory failure secondary to PNA , discussed with EICU attending Dr Carlin who aggrees with above assewment and curerent plan.     PAST MEDICAL & SURGICAL HISTORY:  Dyslipidemia  Hypothyroidism  Parkinson's disease  Diabetes mellitus  CAD (coronary artery disease) of artery bypass graft  Benign essential hypertension  S/P CABG (coronary artery bypass graft)  Pacemaker    FAMILY HISTORY:  No pertinent family history in first degree relatives    Vital Signs Last 24 Hrs  T(C): 39.5 (25 Nov 2018 21:30), Max: 41.2 (25 Nov 2018 20:00)  T(F): 103.1 (25 Nov 2018 21:30), Max: 106.1 (25 Nov 2018 20:00)  HR: 107 (25 Nov 2018 21:30) (94 - 135)  BP: 111/100 (25 Nov 2018 21:30) (91/79 - 156/104)  BP(mean): 103 (25 Nov 2018 21:30) (53 - 112)  RR: 25 (25 Nov 2018 21:30) (25 - 47)  SpO2: 99% (25 Nov 2018 21:30) (92% - 99%)  ABG - ( 25 Nov 2018 21:02 )  pH, Arterial: x     pH, Blood: 7.46  /  pCO2: 24    /  pO2: 466   / HCO3: 20    / Base Excess: -6.4  /  SaO2: 100       I&O's Summary    LABS                       14.9   5.85  )-----------( 425      ( 25 Nov 2018 20:27 )             45.8     PT/INR - ( 25 Nov 2018 20:27 )   PT: 18.8 sec;   INR: 1.70 ratio    PTT - ( 25 Nov 2018 20:27 )  PTT:31.3 sec    CAPILLARY BLOOD GLUCOSE    VENT SETTINGS       MEDICATIONS  (STANDING):      REVIEW OF SYSTEMS:    Unable to obtain due to non invasive mechnical ventilation and  poor mental status     Physicial Exam:     Constitutional: respiratory distress   HEENT: PERRLA, EOMI, no drainage or redness  Neck: No bruits; no thyromegaly or nodules,  No JVD  Back: Normal spine flexure, No CVA tenderness, No deformity or limitation of movement  Respiratory: Breath Sounds equal & clear to percussion & auscultation, no accessory muscle use  Cardiovascular: Regular rate & rhythm, normal S1, S2; no murmurs, gallops or rubs; no S3, S4  Gastrointestinal: Soft, non-tender, non distended no hepatosplenomegaly, normal bowel sounds  Extremities: No peripheral edema, No cyanosis, clubbing   Vascular: Equal and normal pulses: 2+ peripheral pulses throughout  Neurological: GCS:    A&O x 3; no sensory, motor  deficits, normal reflexes  Psychiatric: Normal mood, normal affect  Musculoskeletal: No joint pain, swelling or deformity; no limitation of movement  Skin: No rashes 78y  Male  No Known Allergies    CC: Patient is a 78y old  Male who presents with a chief complaint of lethargy and SOB     HPI:   This is an 78 year old male who lives at home, wife at bedside states he has been weak with decreased appetite for past few days but today started with lethargy and SOB tonight with fever at home. In the emergency room he presented  lethergic ( opens eyes to voice but not following commands) hypoxic on pulseox and SOB with increased work of breathing, accessory muscle use and respiratory rate in the mid 30's,  so he was placed on non invasive ventilation ( BIPAP).  Post BIAPA abg showing respiratory alkalosis with PCo2 455 on 100% Fio2. I performed POCUS finding his IVC to be non compressible with respiratory variation indicating he is volume overloaded at this time. He has currently received 2 liters of normal saline and at 60.03 Kg his above his 30cc/kg of fluid resuscitation for sepsis guidelines.   He remains febrile and has lactate of 5.0 but his systolic blood pressure is currently preserved in the 124-135 range but if he becomes hypotensive will have low threshold for IV pressors to maintain MAP greater than 65 given his current volume status. He has some residual increased work of breathing on BIPAP will adjust settings.  His Accu-Chek has been reported to be 655 given serum glucose of 315 will repeat but if hyperglycemia persists will start inusilin drip for sepsis induced hyperglycemia , this is unlikely DKA given his anion gap is 5.   I recommend admission to SPCU and will oversee care for acute hypoxic respiratory failure secondary to PNA , discussed with EICU attending Dr Carlin who agrees with above assessment as well as recommendation and plan of care. Admitting hospitalist attending is Dr Abdalla.     PAST MEDICAL & SURGICAL HISTORY:  Dyslipidemia  Hypothyroidism  Parkinson's disease  Diabetes mellitus  CAD (coronary artery disease) of artery bypass graft  Benign essential hypertension  S/P CABG (coronary artery bypass graft)  Pacemaker    FAMILY HISTORY:  No pertinent family history in first degree relatives    Vital Signs Last 24 Hrs  T(C): 39.5 (25 Nov 2018 21:30), Max: 41.2 (25 Nov 2018 20:00)  T(F): 103.1 (25 Nov 2018 21:30), Max: 106.1 (25 Nov 2018 20:00)  HR: 107 (25 Nov 2018 21:30) (94 - 135)  BP: 111/100 (25 Nov 2018 21:30) (91/79 - 156/104)  BP(mean): 103 (25 Nov 2018 21:30) (53 - 112)  RR: 25 (25 Nov 2018 21:30) (25 - 47)  SpO2: 99% (25 Nov 2018 21:30) (92% - 99%)  ABG - ( 25 Nov 2018 21:02 )  pH, Arterial: x     pH, Blood: 7.46  /  pCO2: 24    /  pO2: 466   / HCO3: 20    / Base Excess: -6.4  /  SaO2: 100       I&O's Summary    LABS                       14.9   5.85  )-----------( 425      ( 25 Nov 2018 20:27 )             45.8     PT/INR - ( 25 Nov 2018 20:27 )   PT: 18.8 sec;   INR: 1.70 ratio    PTT - ( 25 Nov 2018 20:27 )  PTT:31.3 sec    CAPILLARY BLOOD GLUCOSE    VENT SETTINGS   Non Invasive Ventilation ( BIPAP ) IPAP 10/EPAP5 and FIO2 50%     MEDICATIONS  (STANDING):      REVIEW OF SYSTEMS:    Unable to obtain due to non invasive mechnical ventilation and  poor mental status     Physicial Exam:     Constitutional: respiratory distress   HEENT: PERRLA, EOMI, no drainage or redness  Neck: No bruits; no thyromegaly or nodules,  No JVD  Back: Normal spine flexure, No CVA tenderness, No deformity or limitation of movement  Respiratory: Breath Sounds show rhonchi bialaterly to auscultation, positive accessory muscle use on BIPAP and tachypneic   Cardiovascular: Regular rate & rhythm, normal S1, S2; no murmurs, gallops or rubs; no S3, S4  Gastrointestinal: Soft, non-tender, non distended no hepatosplenomegaly, normal bowel sounds  Extremities: No peripheral edema, No cyanosis, clubbing   Vascular: Equal and normal pulses: 2+ peripheral pulses throughout  Neurological: lethargic opens eyes but not following commands,  no sensory, motor  deficits, normal reflexes  Musculoskeletal: No joint pain, swelling or deformity; no limitation of movement  Skin: No rashes

## 2018-11-25 NOTE — H&P ADULT - PROBLEM SELECTOR PLAN 7
Uncontrolled, hold PO meds, started on corrective insulin Lispro coverage scale Q 6h till feeding resumed if passed speech & swallow Vs tube feeding, will check glycohemoglobin level in am.

## 2018-11-25 NOTE — H&P ADULT - PROBLEM SELECTOR PLAN 8
Plavix is on hold till PO resumed, switched Aspirin to rectal Aspirin for now, also Metoprolol switched to IVP around the clock till feeding resumed, troponin in am.

## 2018-11-25 NOTE — H&P ADULT - PROBLEM SELECTOR PLAN 4
H/O BPH, failed [place]'s cath placement at ED, urology consult was called with Dr. Stark, cystostomy with suprapubic indwelling cath was placed by him, he will follow.

## 2018-11-25 NOTE — ED PROVIDER NOTE - NS_ ATTENDINGSCRIBEDETAILS _ED_A_ED_FT
Reid Villegas MD - The scribe's documentation has been prepared under my direction and personally reviewed by me in its entirety. I confirm that the note above accurately reflects all work, treatment, procedures, and medical decision making performed by me.

## 2018-11-25 NOTE — ED PROVIDER NOTE - OBJECTIVE STATEMENT
77 y/o M with a PMHx of Parkinson's Disease w/ Dementia, CAD, s/p CABG, s/p Pacemaker, Hypothyroidism, and DM presenting to the ED via EMS with wife for evaluation of AMS, in respiratory distress. Per wife, pt has been non-verbal over the past week and increasingly fatigued. Wife states that pt has not opened his eyes today which is not his baseline. +decreased PO intake. Wife denies that pt has appeared in pain or appeared to have difficulty breathing besides today. No known fevers, wife has not taken pt's temperature. PMD: Dr. Brenner. Unable to obtain full HPI secondary to pt's AMS and Dementia. 77 y/o M with a PMHx of Parkinson's Disease w/ Dementia, CAD, s/p CABG, s/p Pacemaker, Hypothyroidism, and DM presenting to the ED via EMS with wife for evaluation of AMS, in respiratory distress. Per wife, pt has been non-verbal over the past week and increasingly fatigued. Wife states that pt has not opened his eyes today which is not his baseline. +decreased PO intake. Wife denies that pt has appeared in pain or appeared to have difficulty breathing besides today. Temp 106.1 rectally in ED. PMD: Dr. Brenner. Unable to obtain full HPI secondary to pt's AMS and Dementia.

## 2018-11-25 NOTE — H&P ADULT - PROBLEM SELECTOR PLAN 6
ML related to liver cell failure given the LFTs, not on anticoagulants, will monitor coagulation profile.

## 2018-11-25 NOTE — ED ADULT NURSE NOTE - SEPSIS SCREEN SIGNS AND SYMPTOMS, MLM
respiratory rate greater than 20 breaths/min/acutely altered mental status/temp greater than 100.9 degrees F/38.3 degrees C

## 2018-11-25 NOTE — H&P ADULT - NSHPREVIEWOFSYSTEMS_GEN_ALL_CORE
Unable to obtain because of patient's condition. -  Unable to obtain because of patient's condition.

## 2018-11-25 NOTE — H&P ADULT - ASSESSMENT
77 y/o M with PMH of DM, Dyslipidemia, CAD s/p CABG, PPM, Parkinson's Disease with Dementia, Hypothyroidism and BPH presented with respiratory distress.

## 2018-11-25 NOTE — H&P ADULT - PROBLEM SELECTOR PLAN 2
ML 2ry to #1, lactic acid of 5.0 with metabolic acidosis, received 2000 ml RL bolus by ED team, antibiotics therapy & ID consult as stated above.

## 2018-11-25 NOTE — H&P ADULT - PROBLEM SELECTOR PLAN 1
ML aspiration PNA given the scenario, started on Zosyn, trend temp & TLC, f/u culture results, will keep NPO till speech & swallow evaluation, aspiration precautions, ID consult with Dr. Lala was called.

## 2018-11-25 NOTE — H&P ADULT - PMH
Benign essential hypertension    CAD (coronary artery disease) of artery bypass graft    Diabetes mellitus    Dyslipidemia    Hypothyroidism    Parkinson's disease Benign essential hypertension    CAD (coronary artery disease) of artery bypass graft    Dyslipidemia    Hypothyroidism    Parkinson's disease    Type 2 diabetes mellitus

## 2018-11-25 NOTE — CONSULT NOTE ADULT - PROBLEM SELECTOR RECOMMENDATION 3
Initial broad spectrum coverage for MDR orgamsims including staph aureus and gram negatives in the form of Zosyn   Follow up sputum cultre the narrow specrtum based on culture  sensitivities likely aspiration PNA given report of lethargy at home and right lower lobe infiltrate on chest xray   Initial broad spectrum coverage for MDR organisms including staph aureus and gram negatives in the form of Zosyn   Follow up sputum cultre the narrow specrtum based on culture  sensitivities

## 2018-11-25 NOTE — ED PROVIDER NOTE - MUSCULOSKELETAL, MLM
Spine appears normal, range of motion is not limited, no muscle or joint tenderness Spine appears normal. No apparent tenderness.

## 2018-11-25 NOTE — ED ADULT NURSE NOTE - NSIMPLEMENTINTERV_GEN_ALL_ED
Implemented All Fall with Harm Risk Interventions:  [place] to call system. Call bell, personal items and telephone within reach. Instruct patient to call for assistance. Room bathroom lighting operational. Non-slip footwear when patient is off stretcher. Physically safe environment: no spills, clutter or unnecessary equipment. Stretcher in lowest position, wheels locked, appropriate side rails in place. Provide visual cue, wrist band, yellow gown, etc. Monitor gait and stability. Monitor for mental status changes and reorient to person, place, and time. Review medications for side effects contributing to fall risk. Reinforce activity limits and safety measures with patient and family. Provide visual clues: red socks.

## 2018-11-25 NOTE — H&P ADULT - PROBLEM SELECTOR PLAN 5
BUN/Cr were normal a month ago as per records, ML related to poor PO intake over the past 4 days as per wife, also sepsis is a possible factor, IVF hydration as stated above, will trend BUN/Cr, avoid nephrotoxins as possible.

## 2018-11-25 NOTE — H&P ADULT - NSHPPHYSICALEXAM_GEN_ALL_CORE
-    Vital Signs Last 24 Hrs  T(C): 39.3 (25 Nov 2018 22:00), Max: 41.2 (25 Nov 2018 20:00)  T(F): 102.7 (25 Nov 2018 22:00), Max: 106.1 (25 Nov 2018 20:00)  HR: 102 (25 Nov 2018 22:00) (99 - 135)  BP: 123/98 (25 Nov 2018 22:00) (91/79 - 156/104)  BP(mean): 105 (25 Nov 2018 22:00) (53 - 112)  RR: 22 (25 Nov 2018 22:00) (22 - 47)  SpO2: 99% (25 Nov 2018 22:00) (90% - 99%)          PHYSICAL EXAM:    GENERAL: NAD, well-groomed, well-developed.  HEAD:  Atraumatic, Norm cephalic.  EYES: PERRLA, conjunctiva clear.  ENMT: no nasal discharge, no maggie-pharyngeal erythema or exudates, dry MM.   NECK: Supple, No JVD.  NERVOUS SYSTEM:  Awake but lethargic, non verbal, rigidity with coarse static tremors, arm flexed posture.  CHEST/LUNG: fair air entry B/L, bronchial BS on the right middle & lower lung zones, with coarse rales, no rhonchi, or wheezing.  HEART: Normal S1 & S2, no murmurs, or extra sounds.  ABDOMEN:  soft but with guarding, uses accessory muscles for breathing, non-distended; bowel sounds present, no palpable masses or organomegaly.  EXTREMITIES:  No clubbing, cyanosis, or edema.  VASCULAR: 2+ radial, brachial pulses equal B/L.  SKIN: No rashes or lesions.  PSYCH: No agitation.

## 2018-11-25 NOTE — H&P ADULT - HISTORY OF PRESENT ILLNESS
This is an 77 y/o M with PMH of DM, Dyslipidemia, CAD s/p CABG, PPM, Parkinson's Disease with Dementia, Hypothyroidism and BPH who presented with respiratory distress. As per patient's wife at the bedside he was at his baseline condition till 4 days ago when she noticed that he closes his mouth & doesn't allow any thing to be put in his mouth, and she has to use a dripper to feed him. Yesterday she noticed that he is not responding, and today he was breathing fast with audible wheezing, so she brought him to the hospital, no cough, fever, or chills at home, also denies vomiting, no BM over the past 4 days. Wife stated also that patient "lost his voice" a month ago because of his parkinson's disease, but was able to understand and interact non verbally, till yesterday.

## 2018-11-25 NOTE — H&P ADULT - ATTENDING COMMENTS
Management plan was discussed with patient's wife at the bedside, she understands & agrees. Patient is full code.

## 2018-11-25 NOTE — ED PROVIDER NOTE - ENMT, MLM
Airway patent, Nasal mucosa clear. Mouth with normal mucosa. Unable to visualize oropharynx. Airway patent, Nasal mucosa clear.  Unable to visualize oropharynx.

## 2018-11-25 NOTE — CONSULT NOTE ADULT - PROBLEM SELECTOR RECOMMENDATION 9
-Patient currently on non invasive ventialtion in form of BIPAP  -titrate settings to maintain SaO2 >90%, or pH >7.25  - repeat ABG   -VAP prophylaxis with HOB 30 degrees   -aggressive chest PT and suctioning -Patient currently on non invasive ventilation in form of BIPAP  -titrate settings to maintain SaO2 >90%, or pH >7.25  - repeat ABG   -VAP prophylaxis with HOB 30 degrees   -aggressive chest PT and suctioning

## 2018-11-25 NOTE — H&P ADULT - NSHPLABSRESULTS_GEN_ALL_CORE
-      Lactate Trend  11-25 @ 21:33 Lactate:5.0                           14.9   5.85  )-----------( 425      ( 25 Nov 2018 20:27 )             45.8            11-25    124<L>  |  91<L>  |  27<H>  ----------------------------<  697<HH>  3.7   |  17<L>  |  1.52<H>    Ca    7.2<L>      25 Nov 2018 21:33    TPro  4.9<L>  /  Alb  1.7<L>  /  TBili  0.8  /  DBili  x   /  AST  88<H>  /  ALT  12  /  AlkPhos  41  11-25        ABG - ( 25 Nov 2018 21:02 )  pH, Arterial: x     pH, Blood: 7.46  /  pCO2: 24    /  pO2: 466   / HCO3: 20    / Base Excess: -6.4  /  SaO2: 100         PT/INR - ( 25 Nov 2018 20:27 )   PT: 18.8 sec;   INR: 1.70 ratio         PTT - ( 25 Nov 2018 20:27 )  PTT:31.3 sec        CXR:    As per my review shows s/p mid sternotomy, mediastinal surgical clips, right sided PMKR with leads in place, normal cardiac shadow size, right lower lung zone pulmonary infiltrates, no pleural effusion, or pneumothorax. Pending official report.          EKG:    As per my review shows ST at 120/min, normal IA & QTc intervals, normal QRS voltage, duration, and axis (+75), with normal transition, nonspecific ST-T abnormality.       -

## 2018-11-25 NOTE — H&P ADULT - PROBLEM SELECTOR PLAN 10
IMPROVE VTE Individual Risk Assessment          RISK                                                          Points  [  ] Previous VTE                                                3  [  ] Thrombophilia                                             2  [ x ] Lower limb paralysis   (virtual)                                 2        (unable to hold up >15 seconds)    [  ] Current Cancer                                             2         (within 6 months)  [ x ] Immobilization > 24 hrs                              1  [ x ] ICU/CCU stay > 24 hours                            1  [ x ] Age > 60                                                    1    IMPROVE VTE Score 5.    **IMPROVE score of 5 in addition to the other risk factors not included in this scoring system, no pharmacological DVT prophylaxis the coagulopathy.

## 2018-11-25 NOTE — ED PROVIDER NOTE - RESPIRATORY, MLM
Tachypneic, in moderate respiratory distress. Diffuse inspiratory and expiratory rhonchi with decreased breath sounds throughout the entire right lung field.

## 2018-11-26 DIAGNOSIS — D68.9 COAGULATION DEFECT, UNSPECIFIED: ICD-10-CM

## 2018-11-26 DIAGNOSIS — R33.9 RETENTION OF URINE, UNSPECIFIED: ICD-10-CM

## 2018-11-26 DIAGNOSIS — E11.9 TYPE 2 DIABETES MELLITUS WITHOUT COMPLICATIONS: ICD-10-CM

## 2018-11-26 DIAGNOSIS — Z29.9 ENCOUNTER FOR PROPHYLACTIC MEASURES, UNSPECIFIED: ICD-10-CM

## 2018-11-26 DIAGNOSIS — I25.10 ATHEROSCLEROTIC HEART DISEASE OF NATIVE CORONARY ARTERY WITHOUT ANGINA PECTORIS: ICD-10-CM

## 2018-11-26 DIAGNOSIS — N17.9 ACUTE KIDNEY FAILURE, UNSPECIFIED: ICD-10-CM

## 2018-11-26 DIAGNOSIS — J96.00 ACUTE RESPIRATORY FAILURE, UNSPECIFIED WHETHER WITH HYPOXIA OR HYPERCAPNIA: ICD-10-CM

## 2018-11-26 DIAGNOSIS — A41.9 SEPSIS, UNSPECIFIED ORGANISM: ICD-10-CM

## 2018-11-26 DIAGNOSIS — J18.9 PNEUMONIA, UNSPECIFIED ORGANISM: ICD-10-CM

## 2018-11-26 LAB
ACETONE SERPL-MCNC: NEGATIVE — SIGNIFICANT CHANGE UP
ALBUMIN SERPL ELPH-MCNC: 2.5 G/DL — LOW (ref 3.3–5)
ALP SERPL-CCNC: 57 U/L — SIGNIFICANT CHANGE UP (ref 30–120)
ALT FLD-CCNC: 79 U/L DA — HIGH (ref 10–60)
ANION GAP SERPL CALC-SCNC: 12 MMOL/L — SIGNIFICANT CHANGE UP (ref 5–17)
ANION GAP SERPL CALC-SCNC: 15 MMOL/L — SIGNIFICANT CHANGE UP (ref 5–17)
ANION GAP SERPL CALC-SCNC: 15 MMOL/L — SIGNIFICANT CHANGE UP (ref 5–17)
AST SERPL-CCNC: 238 U/L — HIGH (ref 10–40)
BASE EXCESS BLDA CALC-SCNC: -15.8 MMOL/L — LOW (ref -2–2)
BASE EXCESS BLDA CALC-SCNC: -6.4 MMOL/L — LOW (ref -2–2)
BILIRUB SERPL-MCNC: 2.1 MG/DL — HIGH (ref 0.2–1.2)
BLOOD GAS COMMENTS: SIGNIFICANT CHANGE UP
BLOOD GAS SOURCE: SIGNIFICANT CHANGE UP
BLOOD GAS SOURCE: SIGNIFICANT CHANGE UP
BUN SERPL-MCNC: 28 MG/DL — HIGH (ref 7–23)
BUN SERPL-MCNC: 29 MG/DL — HIGH (ref 7–23)
BUN SERPL-MCNC: 31 MG/DL — HIGH (ref 7–23)
CALCIUM SERPL-MCNC: 7.9 MG/DL — LOW (ref 8.4–10.5)
CALCIUM SERPL-MCNC: 8.2 MG/DL — LOW (ref 8.4–10.5)
CALCIUM SERPL-MCNC: 8.3 MG/DL — LOW (ref 8.4–10.5)
CHLORIDE SERPL-SCNC: 100 MMOL/L — SIGNIFICANT CHANGE UP (ref 96–108)
CHLORIDE SERPL-SCNC: 101 MMOL/L — SIGNIFICANT CHANGE UP (ref 96–108)
CHLORIDE SERPL-SCNC: 103 MMOL/L — SIGNIFICANT CHANGE UP (ref 96–108)
CO2 SERPL-SCNC: 20 MMOL/L — LOW (ref 22–31)
CO2 SERPL-SCNC: 22 MMOL/L — SIGNIFICANT CHANGE UP (ref 22–31)
CO2 SERPL-SCNC: 22 MMOL/L — SIGNIFICANT CHANGE UP (ref 22–31)
CREAT SERPL-MCNC: 1.3 MG/DL — SIGNIFICANT CHANGE UP (ref 0.5–1.3)
CREAT SERPL-MCNC: 1.32 MG/DL — HIGH (ref 0.5–1.3)
CREAT SERPL-MCNC: 1.64 MG/DL — HIGH (ref 0.5–1.3)
GLUCOSE SERPL-MCNC: 181 MG/DL — HIGH (ref 70–99)
GLUCOSE SERPL-MCNC: 234 MG/DL — HIGH (ref 70–99)
GLUCOSE SERPL-MCNC: 255 MG/DL — HIGH (ref 70–99)
GRAM STN FLD: SIGNIFICANT CHANGE UP
HBA1C BLD-MCNC: 8.5 % — HIGH (ref 4–5.6)
HCO3 BLDA-SCNC: 12 MMOL/L — LOW (ref 21–29)
HCO3 BLDA-SCNC: 20 MMOL/L — LOW (ref 21–29)
HCT VFR BLD CALC: 42.9 % — SIGNIFICANT CHANGE UP (ref 39–50)
HGB BLD-MCNC: 14.1 G/DL — SIGNIFICANT CHANGE UP (ref 13–17)
HOROWITZ INDEX BLDA+IHG-RTO: 21 — SIGNIFICANT CHANGE UP
HOROWITZ INDEX BLDA+IHG-RTO: 50 — SIGNIFICANT CHANGE UP
INR BLD: 1.66 RATIO — HIGH (ref 0.88–1.16)
LACTATE SERPL-SCNC: 4.3 MMOL/L — CRITICAL HIGH (ref 0.7–2)
LACTATE SERPL-SCNC: 4.9 MMOL/L — CRITICAL HIGH (ref 0.7–2)
LACTATE SERPL-SCNC: 4.9 MMOL/L — CRITICAL HIGH (ref 0.7–2)
LACTATE SERPL-SCNC: 5.8 MMOL/L — CRITICAL HIGH (ref 0.7–2)
MAGNESIUM SERPL-MCNC: 2.1 MG/DL — SIGNIFICANT CHANGE UP (ref 1.6–2.6)
MCHC RBC-ENTMCNC: 27.8 PG — SIGNIFICANT CHANGE UP (ref 27–34)
MCHC RBC-ENTMCNC: 32.9 GM/DL — SIGNIFICANT CHANGE UP (ref 32–36)
MCV RBC AUTO: 84.6 FL — SIGNIFICANT CHANGE UP (ref 80–100)
NRBC # BLD: 0 /100 WBCS — SIGNIFICANT CHANGE UP (ref 0–0)
NT-PROBNP SERPL-SCNC: 4652 PG/ML — HIGH (ref 0–450)
PCO2 BLDA: 14 MMHG — LOW (ref 32–46)
PCO2 BLDA: 24 MMHG — LOW (ref 32–46)
PH BLD: 7.4 — SIGNIFICANT CHANGE UP (ref 7.35–7.45)
PH BLD: 7.45 — SIGNIFICANT CHANGE UP (ref 7.35–7.45)
PHOSPHATE SERPL-MCNC: 2.9 MG/DL — SIGNIFICANT CHANGE UP (ref 2.5–4.5)
PLATELET # BLD AUTO: 260 K/UL — SIGNIFICANT CHANGE UP (ref 150–400)
PO2 BLDA: 121 MMHG — HIGH (ref 74–108)
PO2 BLDA: 174 MMHG — HIGH (ref 74–108)
POTASSIUM SERPL-MCNC: 3.2 MMOL/L — LOW (ref 3.5–5.3)
POTASSIUM SERPL-MCNC: 3.6 MMOL/L — SIGNIFICANT CHANGE UP (ref 3.5–5.3)
POTASSIUM SERPL-MCNC: 5.8 MMOL/L — HIGH (ref 3.5–5.3)
POTASSIUM SERPL-SCNC: 3.2 MMOL/L — LOW (ref 3.5–5.3)
POTASSIUM SERPL-SCNC: 3.6 MMOL/L — SIGNIFICANT CHANGE UP (ref 3.5–5.3)
POTASSIUM SERPL-SCNC: 5.8 MMOL/L — HIGH (ref 3.5–5.3)
PROT SERPL-MCNC: 6.6 G/DL — SIGNIFICANT CHANGE UP (ref 6–8.3)
PROTHROM AB SERPL-ACNC: 18.4 SEC — HIGH (ref 10–12.9)
RBC # BLD: 5.07 M/UL — SIGNIFICANT CHANGE UP (ref 4.2–5.8)
RBC # FLD: 13.2 % — SIGNIFICANT CHANGE UP (ref 10.3–14.5)
SAO2 % BLDA: 99 % — HIGH (ref 92–96)
SAO2 % BLDA: 99 % — HIGH (ref 92–96)
SODIUM SERPL-SCNC: 136 MMOL/L — SIGNIFICANT CHANGE UP (ref 135–145)
SODIUM SERPL-SCNC: 137 MMOL/L — SIGNIFICANT CHANGE UP (ref 135–145)
SODIUM SERPL-SCNC: 137 MMOL/L — SIGNIFICANT CHANGE UP (ref 135–145)
SPECIMEN SOURCE: SIGNIFICANT CHANGE UP
TROPONIN I SERPL-MCNC: 0.12 NG/ML — HIGH (ref 0.02–0.06)
TROPONIN I SERPL-MCNC: 0.18 NG/ML — HIGH (ref 0.02–0.06)
WBC # BLD: 4.66 K/UL — SIGNIFICANT CHANGE UP (ref 3.8–10.5)
WBC # FLD AUTO: 4.66 K/UL — SIGNIFICANT CHANGE UP (ref 3.8–10.5)

## 2018-11-26 PROCEDURE — 71045 X-RAY EXAM CHEST 1 VIEW: CPT | Mod: 26,76

## 2018-11-26 PROCEDURE — 93306 TTE W/DOPPLER COMPLETE: CPT | Mod: 26

## 2018-11-26 PROCEDURE — 99233 SBSQ HOSP IP/OBS HIGH 50: CPT

## 2018-11-26 RX ORDER — SODIUM CHLORIDE 9 MG/ML
1000 INJECTION INTRAMUSCULAR; INTRAVENOUS; SUBCUTANEOUS
Qty: 0 | Refills: 0 | Status: DISCONTINUED | OUTPATIENT
Start: 2018-11-26 | End: 2018-11-26

## 2018-11-26 RX ORDER — DEXTROSE 50 % IN WATER 50 %
25 SYRINGE (ML) INTRAVENOUS ONCE
Qty: 0 | Refills: 0 | Status: DISCONTINUED | OUTPATIENT
Start: 2018-11-26 | End: 2018-12-07

## 2018-11-26 RX ORDER — GLUCAGON INJECTION, SOLUTION 0.5 MG/.1ML
1 INJECTION, SOLUTION SUBCUTANEOUS ONCE
Qty: 0 | Refills: 0 | Status: DISCONTINUED | OUTPATIENT
Start: 2018-11-26 | End: 2018-12-07

## 2018-11-26 RX ORDER — POTASSIUM CHLORIDE 20 MEQ
10 PACKET (EA) ORAL
Qty: 0 | Refills: 0 | Status: COMPLETED | OUTPATIENT
Start: 2018-11-26 | End: 2018-11-26

## 2018-11-26 RX ORDER — SODIUM CHLORIDE 9 MG/ML
1000 INJECTION, SOLUTION INTRAVENOUS ONCE
Qty: 0 | Refills: 0 | Status: COMPLETED | OUTPATIENT
Start: 2018-11-26 | End: 2018-11-26

## 2018-11-26 RX ORDER — LEVOTHYROXINE SODIUM 125 MCG
62.5 TABLET ORAL AT BEDTIME
Qty: 0 | Refills: 0 | Status: DISCONTINUED | OUTPATIENT
Start: 2018-11-26 | End: 2018-11-27

## 2018-11-26 RX ORDER — DEXTROSE 50 % IN WATER 50 %
15 SYRINGE (ML) INTRAVENOUS ONCE
Qty: 0 | Refills: 0 | Status: DISCONTINUED | OUTPATIENT
Start: 2018-11-26 | End: 2018-12-07

## 2018-11-26 RX ORDER — CARBIDOPA AND LEVODOPA 25; 100 MG/1; MG/1
1.5 TABLET ORAL
Qty: 0 | Refills: 0 | Status: DISCONTINUED | OUTPATIENT
Start: 2018-11-26 | End: 2018-12-03

## 2018-11-26 RX ORDER — METOPROLOL TARTRATE 50 MG
5 TABLET ORAL EVERY 6 HOURS
Qty: 0 | Refills: 0 | Status: DISCONTINUED | OUTPATIENT
Start: 2018-11-26 | End: 2018-11-27

## 2018-11-26 RX ORDER — INSULIN LISPRO 100/ML
VIAL (ML) SUBCUTANEOUS EVERY 6 HOURS
Qty: 0 | Refills: 0 | Status: DISCONTINUED | OUTPATIENT
Start: 2018-11-26 | End: 2018-12-07

## 2018-11-26 RX ORDER — SODIUM CHLORIDE 9 MG/ML
1000 INJECTION, SOLUTION INTRAVENOUS
Qty: 0 | Refills: 0 | Status: DISCONTINUED | OUTPATIENT
Start: 2018-11-26 | End: 2018-11-27

## 2018-11-26 RX ORDER — PANTOPRAZOLE SODIUM 20 MG/1
40 TABLET, DELAYED RELEASE ORAL DAILY
Qty: 0 | Refills: 0 | Status: DISCONTINUED | OUTPATIENT
Start: 2018-11-26 | End: 2018-11-27

## 2018-11-26 RX ORDER — MORPHINE SULFATE 50 MG/1
1 CAPSULE, EXTENDED RELEASE ORAL ONCE
Qty: 0 | Refills: 0 | Status: DISCONTINUED | OUTPATIENT
Start: 2018-11-26 | End: 2018-11-26

## 2018-11-26 RX ORDER — ALBUMIN HUMAN 25 %
50 VIAL (ML) INTRAVENOUS EVERY 8 HOURS
Qty: 0 | Refills: 0 | Status: COMPLETED | OUTPATIENT
Start: 2018-11-26 | End: 2018-11-27

## 2018-11-26 RX ORDER — DEXTROSE 50 % IN WATER 50 %
12.5 SYRINGE (ML) INTRAVENOUS ONCE
Qty: 0 | Refills: 0 | Status: DISCONTINUED | OUTPATIENT
Start: 2018-11-26 | End: 2018-12-07

## 2018-11-26 RX ORDER — SODIUM CHLORIDE 9 MG/ML
500 INJECTION INTRAMUSCULAR; INTRAVENOUS; SUBCUTANEOUS ONCE
Qty: 0 | Refills: 0 | Status: COMPLETED | OUTPATIENT
Start: 2018-11-26 | End: 2018-11-26

## 2018-11-26 RX ORDER — DEXMEDETOMIDINE HYDROCHLORIDE IN 0.9% SODIUM CHLORIDE 4 UG/ML
0.3 INJECTION INTRAVENOUS
Qty: 200 | Refills: 0 | Status: DISCONTINUED | OUTPATIENT
Start: 2018-11-26 | End: 2018-12-02

## 2018-11-26 RX ORDER — ATORVASTATIN CALCIUM 80 MG/1
40 TABLET, FILM COATED ORAL AT BEDTIME
Qty: 0 | Refills: 0 | Status: DISCONTINUED | OUTPATIENT
Start: 2018-11-26 | End: 2018-12-07

## 2018-11-26 RX ORDER — ASPIRIN/CALCIUM CARB/MAGNESIUM 324 MG
300 TABLET ORAL DAILY
Qty: 0 | Refills: 0 | Status: DISCONTINUED | OUTPATIENT
Start: 2018-11-26 | End: 2018-11-27

## 2018-11-26 RX ORDER — CLOPIDOGREL BISULFATE 75 MG/1
75 TABLET, FILM COATED ORAL DAILY
Qty: 0 | Refills: 0 | Status: DISCONTINUED | OUTPATIENT
Start: 2018-11-26 | End: 2018-12-07

## 2018-11-26 RX ORDER — PROPOFOL 10 MG/ML
5 INJECTION, EMULSION INTRAVENOUS
Qty: 1000 | Refills: 0 | Status: DISCONTINUED | OUTPATIENT
Start: 2018-11-26 | End: 2018-11-26

## 2018-11-26 RX ORDER — AZITHROMYCIN 500 MG/1
500 TABLET, FILM COATED ORAL EVERY 24 HOURS
Qty: 0 | Refills: 0 | Status: DISCONTINUED | OUTPATIENT
Start: 2018-11-26 | End: 2018-11-29

## 2018-11-26 RX ORDER — SODIUM CHLORIDE 9 MG/ML
1000 INJECTION, SOLUTION INTRAVENOUS
Qty: 0 | Refills: 0 | Status: DISCONTINUED | OUTPATIENT
Start: 2018-11-26 | End: 2018-12-07

## 2018-11-26 RX ORDER — MIDODRINE HYDROCHLORIDE 2.5 MG/1
5 TABLET ORAL THREE TIMES A DAY
Qty: 0 | Refills: 0 | Status: DISCONTINUED | OUTPATIENT
Start: 2018-11-26 | End: 2018-12-02

## 2018-11-26 RX ADMIN — Medication 4: at 06:31

## 2018-11-26 RX ADMIN — Medication 100 MILLIEQUIVALENT(S): at 14:08

## 2018-11-26 RX ADMIN — Medication 50 MILLILITER(S): at 21:47

## 2018-11-26 RX ADMIN — Medication 300 MILLIGRAM(S): at 12:30

## 2018-11-26 RX ADMIN — SODIUM CHLORIDE 80 MILLILITER(S): 9 INJECTION, SOLUTION INTRAVENOUS at 12:31

## 2018-11-26 RX ADMIN — PROPOFOL 1.81 MICROGRAM(S)/KG/MIN: 10 INJECTION, EMULSION INTRAVENOUS at 03:35

## 2018-11-26 RX ADMIN — CLOPIDOGREL BISULFATE 75 MILLIGRAM(S): 75 TABLET, FILM COATED ORAL at 17:17

## 2018-11-26 RX ADMIN — ATORVASTATIN CALCIUM 40 MILLIGRAM(S): 80 TABLET, FILM COATED ORAL at 21:28

## 2018-11-26 RX ADMIN — MIDODRINE HYDROCHLORIDE 5 MILLIGRAM(S): 2.5 TABLET ORAL at 21:28

## 2018-11-26 RX ADMIN — SODIUM CHLORIDE 1000 MILLILITER(S): 9 INJECTION, SOLUTION INTRAVENOUS at 10:59

## 2018-11-26 RX ADMIN — MORPHINE SULFATE 1 MILLIGRAM(S): 50 CAPSULE, EXTENDED RELEASE ORAL at 03:01

## 2018-11-26 RX ADMIN — Medication 100 MILLIEQUIVALENT(S): at 15:07

## 2018-11-26 RX ADMIN — Medication 62.5 MICROGRAM(S): at 22:03

## 2018-11-26 RX ADMIN — PIPERACILLIN AND TAZOBACTAM 25 GRAM(S): 4; .5 INJECTION, POWDER, LYOPHILIZED, FOR SOLUTION INTRAVENOUS at 14:01

## 2018-11-26 RX ADMIN — PIPERACILLIN AND TAZOBACTAM 25 GRAM(S): 4; .5 INJECTION, POWDER, LYOPHILIZED, FOR SOLUTION INTRAVENOUS at 05:55

## 2018-11-26 RX ADMIN — Medication 2: at 12:30

## 2018-11-26 RX ADMIN — PANTOPRAZOLE SODIUM 40 MILLIGRAM(S): 20 TABLET, DELAYED RELEASE ORAL at 17:14

## 2018-11-26 RX ADMIN — DEXMEDETOMIDINE HYDROCHLORIDE IN 0.9% SODIUM CHLORIDE 4.52 MICROGRAM(S)/KG/HR: 4 INJECTION INTRAVENOUS at 08:33

## 2018-11-26 RX ADMIN — MORPHINE SULFATE 1 MILLIGRAM(S): 50 CAPSULE, EXTENDED RELEASE ORAL at 02:31

## 2018-11-26 RX ADMIN — Medication 50 MILLILITER(S): at 14:11

## 2018-11-26 RX ADMIN — AZITHROMYCIN 255 MILLIGRAM(S): 500 TABLET, FILM COATED ORAL at 21:11

## 2018-11-26 RX ADMIN — MIDODRINE HYDROCHLORIDE 5 MILLIGRAM(S): 2.5 TABLET ORAL at 17:14

## 2018-11-26 RX ADMIN — SODIUM CHLORIDE 75 MILLILITER(S): 9 INJECTION INTRAMUSCULAR; INTRAVENOUS; SUBCUTANEOUS at 09:55

## 2018-11-26 RX ADMIN — SODIUM CHLORIDE 500 MILLILITER(S): 9 INJECTION INTRAMUSCULAR; INTRAVENOUS; SUBCUTANEOUS at 09:56

## 2018-11-26 RX ADMIN — PIPERACILLIN AND TAZOBACTAM 25 GRAM(S): 4; .5 INJECTION, POWDER, LYOPHILIZED, FOR SOLUTION INTRAVENOUS at 21:47

## 2018-11-26 RX ADMIN — CARBIDOPA AND LEVODOPA 1.5 TABLET(S): 25; 100 TABLET ORAL at 17:14

## 2018-11-26 NOTE — PROVIDER CONTACT NOTE (CHANGE IN STATUS NOTIFICATION) - ASSESSMENT
Patient is an 77yo male presenting with a left 5th metatarsale DTPI about 1 x 1  stable intact periwound blanchable erythema extremities are cold + pulses

## 2018-11-26 NOTE — PROGRESS NOTE ADULT - SUBJECTIVE AND OBJECTIVE BOX
CC.  Respiratory distress  HPI.  Patient is intubated/Sedated.    Unable to obtain ROS or History    Vital Signs Last 24 Hrs  T(C): 37 (26 Nov 2018 08:00), Max: 41.2 (25 Nov 2018 20:00)  T(F): 98.6 (26 Nov 2018 08:00), Max: 106.1 (25 Nov 2018 20:00)  HR: 77 (26 Nov 2018 12:00) (77 - 880)  BP: 89/57 (26 Nov 2018 12:00) (89/57 - 212/188)  BP(mean): 66 (26 Nov 2018 12:00) (53 - 197)  RR: 26 (26 Nov 2018 12:00) (22 - 47)  SpO2: 94% (26 Nov 2018 12:00) (61% - 100%)        PHYSICAL EXAM-  GENERAL: Chronic ill appearing male  HEAD:  Atraumatic, Normocephalic  EYES: EOMI, PERRLA, conjunctiva and sclera clear  NECK: Supple, No JVD, Normal thyroid  NERVOUS SYSTEM:  Sedated unable to  examine  CHEST/LUNG: Diffuse rhonchi sound with decrease air entry.  No retractions or accessory muscle usage  HEART: Regular rate and rhythm; No murmurs, rubs, or gallops  ABDOMEN: Soft, Nontender, Nondistended; Bowel sounds present  EXTREMITIES: No clubbing, cyanosis, or edema  SKIN: No rashes or lesions                              14.1   4.66  )-----------( 260      ( 26 Nov 2018 06:52 )             42.9     11-26    137  |  100  |  31<H>  ----------------------------<  255<H>  3.2<L>   |  22  |  1.64<H>    Ca    8.3<L>      26 Nov 2018 06:52  Phos  2.9     11-26  Mg     2.1     11-26    TPro  6.6  /  Alb  2.5<L>  /  TBili  2.1<H>  /  DBili  x   /  AST  238<H>  /  ALT  79<H>  /  AlkPhos  57  11-26    CARDIAC MARKERS ( 26 Nov 2018 10:00 )  .179 ng/mL / x     / x     / x     / x              PT/INR - ( 26 Nov 2018 06:52 )   PT: 18.4 sec;   INR: 1.66 ratio         PTT - ( 25 Nov 2018 20:27 )  PTT:31.3 sec      MEDICATIONS  (STANDING):  aspirin Suppository 300 milliGRAM(s) Rectal daily  azithromycin  IVPB 500 milliGRAM(s) IV Intermittent every 24 hours  dexmedetomidine Infusion 0.3 MICROgram(s)/kG/Hr (4.522 mL/Hr) IV Continuous <Continuous>  dextrose 5%. 1000 milliLiter(s) (50 mL/Hr) IV Continuous <Continuous>  dextrose 50% Injectable 12.5 Gram(s) IV Push once  dextrose 50% Injectable 25 Gram(s) IV Push once  dextrose 50% Injectable 25 Gram(s) IV Push once  insulin lispro (HumaLOG) corrective regimen sliding scale   SubCutaneous every 6 hours  levothyroxine Injectable 62.5 MICROGram(s) IV Push at bedtime  metoprolol tartrate Injectable 5 milliGRAM(s) IV Push every 6 hours  piperacillin/tazobactam IVPB. 3.375 Gram(s) IV Intermittent every 8 hours  sodium chloride 0.45%. 1000 milliLiter(s) (80 mL/Hr) IV Continuous <Continuous>    MEDICATIONS  (PRN):  acetaminophen  Suppository .. 650 milliGRAM(s) Rectal every 6 hours PRN Temp greater or equal to 38.5C (101.3F)  dextrose 40% Gel 15 Gram(s) Oral once PRN Blood Glucose LESS THAN 70 milliGRAM(s)/deciliter  glucagon  Injectable 1 milliGRAM(s) IntraMuscular once PRN Glucose LESS THAN 70 milligrams/deciliter      Imaging Personally Reviewed:     [x ] YES  [ ] NO    Consultant(s) Notes Reviewed:  [x ] YES  [ ] NO    Care Discussed with Consultants/Other Providers [x ] YES  [ ] No medical contraindication for discharge

## 2018-11-26 NOTE — PROGRESS NOTE ADULT - ASSESSMENT
77 y/o M with PMH of DM, Dyslipidemia, CAD s/p CABG, PPM, Parkinson's Disease with Dementia, Hypothyroidism and BPH presented with respiratory distress.     1. Acute respiratory failure/Sepsis/PNA  -S/P intubation  Possibly secondary to CAP VS.  Aspiration PNA  -Continue with  current antibiotic as per ID  -RSV panel negative.  Blood culture pending  -Further care as per pulmonary    2.  Acute renal failure With urinary retention.  Possibly due to ATN VS. Dehydration in the setting of infection.  -Suprapubic cath  was inserted as per urology  -Continue with IVF  -Avoid nephrotoxin  -nephrology to follow up      3. Coagulopathy. ML related to liver cell failure given the LFTs and current sepsis.    -Monitor LFT/PT/INR    4.  DM2  -Continue with ISS, and Monitor POC  -Hold oral medications    5. HX of CAD.  Continue with ASA, and Metoprolol    6.  Abnormal trop level  -Seems to be secondary to demand mismatch ischemia in the setting of sepsis, and infection  -Obtain serial trop, TTE, and cardiology consult    7.  Parkinson's disease.  Will resume home medications  -Patient has been on puree diet  -Son/Dr. Grider refuses Peg tube placement  -Considering hospice if survives hospitalization    DVT proph heparin    prognosis guarded to poor    Plan of care was discussed with patient, and son/Dr. Grider in great details, All questions were answered to their satisfaction  Seems to understand, and in agreement

## 2018-11-26 NOTE — CONSULT NOTE ADULT - SUBJECTIVE AND OBJECTIVE BOX
Patient is a 78y old  Male who presents with a chief complaint of Respiratory distress. (26 Nov 2018 12:14)    HPI:  This is an 79 y/o M with PMH of DM, Dyslipidemia, CAD s/p CABG, PPM, Parkinson's Disease with Dementia, Hypothyroidism and BPH who presented with respiratory distress. As per patient's wife at the bedside he was at his baseline condition till 4 days ago when she noticed that he closes his mouth & doesn't allow any thing to be put in his mouth, and she has to use a dripper to feed him. Yesterday she noticed that he is not responding, and today he was breathing fast with audible wheezing, so she brought him to the hospital, no cough, fever, or chills at home, also denies vomiting, no BM over the past 4 days. Wife stated also that patient "lost his voice" a month ago because of his parkinson's disease, but was able to understand and interact non verbally, till yesterday. (25 Nov 2018 23:07)    Renal consult called for AMEYA, Hyponatremia. Pt had suprapubic cystostomy done as they were unable to place chaves catheter.   Pt seen in [facility]. On vent support. Family at bedside.       PAST MEDICAL HISTORY:  Dyslipidemia  Hypothyroidism  Parkinson's disease  Diabetes mellitus  CAD (coronary artery disease) of artery bypass graft  Benign essential hypertension  Adult hypothyroidism  Acquired hypothyroidism      PAST SURGICAL HISTORY:  S/P CABG (coronary artery bypass graft)  Pacemaker      FAMILY HISTORY:  Family history of prostate cancer (Sibling)      SOCIAL HISTORY: No smoking or alcohol use     Allergies    No Known Allergies    Intolerances      Home Medications:  Amaryl 2 mg oral tablet: 1 tab(s) orally once a day (24 Sep 2018 14:13)  Aspirin Low Dose 81 mg oral delayed release tablet: 1 tab(s) orally once a day (24 Sep 2018 14:13)  Crestor 10 mg oral tablet: 1 tab(s) orally once a day (at bedtime) (24 Sep 2018 14:13)  metFORMIN 500 mg oral tablet: 1 tab(s) orally 2 times a day (24 Sep 2018 14:13)  metoprolol succinate 25 mg oral tablet, extended release: 1 tab(s) orally once a day (24 Sep 2018 14:13)  Plavix:  orally once a day (24 Sep 2018 14:13)  Sinemet 25 mg-100 mg oral tablet, extended release: 1.5 tab(s) orally 3 times a day (24 Sep 2018 14:13)  Synthroid 125 mcg (0.125 mg) oral tablet: 1 tab(s) orally once a day (24 Sep 2018 14:13)    MEDICATIONS  (STANDING):  aspirin Suppository 300 milliGRAM(s) Rectal daily  atorvastatin 40 milliGRAM(s) Oral at bedtime  azithromycin  IVPB 500 milliGRAM(s) IV Intermittent every 24 hours  carbidopa/levodopa CR 25/100 1.5 Tablet(s) Oral two times a day  clopidogrel Tablet 75 milliGRAM(s) Oral daily  dexmedetomidine Infusion 0.3 MICROgram(s)/kG/Hr (4.522 mL/Hr) IV Continuous <Continuous>  dextrose 5%. 1000 milliLiter(s) (50 mL/Hr) IV Continuous <Continuous>  dextrose 50% Injectable 12.5 Gram(s) IV Push once  dextrose 50% Injectable 25 Gram(s) IV Push once  dextrose 50% Injectable 25 Gram(s) IV Push once  insulin lispro (HumaLOG) corrective regimen sliding scale   SubCutaneous every 6 hours  levothyroxine Injectable 62.5 MICROGram(s) IV Push at bedtime  metoprolol tartrate Injectable 5 milliGRAM(s) IV Push every 6 hours  piperacillin/tazobactam IVPB. 3.375 Gram(s) IV Intermittent every 8 hours  potassium chloride  10 mEq/100 mL IVPB 10 milliEquivalent(s) IV Intermittent every 1 hour  sodium chloride 0.45%. 1000 milliLiter(s) (80 mL/Hr) IV Continuous <Continuous>    MEDICATIONS  (PRN):  acetaminophen  Suppository .. 650 milliGRAM(s) Rectal every 6 hours PRN Temp greater or equal to 38.5C (101.3F)  dextrose 40% Gel 15 Gram(s) Oral once PRN Blood Glucose LESS THAN 70 milliGRAM(s)/deciliter  glucagon  Injectable 1 milliGRAM(s) IntraMuscular once PRN Glucose LESS THAN 70 milligrams/deciliter      REVIEW OF SYSTEMS:  On vent    T(F): 98.6 (11-26-18 @ 08:00), Max: 106.1 (11-25-18 @ 20:00)  HR: 77 (11-26-18 @ 12:00) (77 - 880)  BP: 89/57 (11-26-18 @ 12:00) (89/57 - 212/188)  RR: 26 (11-26-18 @ 12:00) (22 - 47)  SpO2: 94% (11-26-18 @ 12:00) (61% - 100%)  Wt(kg): --    PHYSICAL EXAM:  General: on vent  Respiratory: b/l air entry  Cardiovascular: S1 S2  Gastrointestinal: soft, + suprapubic catheter  Extremities: no edema    Mode: AC/ CMV (Assist Control/ Continuous Mandatory Ventilation)  RR (machine): 14  TV (machine): 400  FiO2: 50  PEEP: 5  ITime: 1  MAP: 7  PIP: 17      11-26    137  |  100  |  31<H>  ----------------------------<  255<H>  3.2<L>   |  22  |  1.64<H>    Ca    8.3<L>      26 Nov 2018 06:52  Phos  2.9     11-26  Mg     2.1     11-26    TPro  6.6  /  Alb  2.5<L>  /  TBili  2.1<H>  /  DBili  x   /  AST  238<H>  /  ALT  79<H>  /  AlkPhos  57  11-26                          14.1   4.66  )-----------( 260      ( 26 Nov 2018 06:52 )             42.9       Hemoglobin: 14.1 g/dL (11-26 @ 06:52)  Hematocrit: 42.9 % (11-26 @ 06:52)  Calcium, Total Serum: 8.3 mg/dL (11-26 @ 06:52)  Potassium, Serum: 3.2 mmol/L (11-26 @ 06:52)      Creatinine, Serum: 1.64 (11-26 @ 06:52)  Creatinine, Serum: 1.52 (11-25 @ 21:33)        LIVER FUNCTIONS - ( 26 Nov 2018 06:52 )  Alb: 2.5 g/dL / Pro: 6.6 g/dL / ALK PHOS: 57 U/L / ALT: 79 U/L DA / AST: 238 U/L / GGT: x           CARDIAC MARKERS ( 26 Nov 2018 10:00 )  .179 ng/mL / x     / x     / x     / x                ABG - ( 26 Nov 2018 11:31 )  pH, Arterial: x     pH, Blood: 7.45  /  pCO2: 24    /  pO2: 121   / HCO3: 20    / Base Excess: -6.4  /  SaO2: 99                I&O's Detail    25 Nov 2018 07:01  -  26 Nov 2018 07:00  --------------------------------------------------------  IN:    IV PiggyBack: 100 mL    propofol Infusion: 10.8 mL    sodium chloride 0.9%: 375 mL  Total IN: 485.8 mL    OUT:  Total OUT: 0 mL    Total NET: 485.8 mL      26 Nov 2018 07:01  -  26 Nov 2018 12:56  --------------------------------------------------------  IN:    dexmedetomidine Infusion: 19.5 mL    sodium chloride 0.45%.: 1000 mL    sodium chloride 0.9%: 150 mL    Sodium Chloride 0.9% IV Bolus: 500 mL  Total IN: 1669.5 mL    OUT:    Indwelling Catheter - Suprapubic: 150 mL  Total OUT: 150 mL    Total NET: 1519.5 mL    < from: Xray Chest 1 View- PORTABLE-Urgent (11.25.18 @ 21:25) >  EXAM:  XR CHEST PORTABLE URGENT 1V                                  PROCEDURE DATE:  11/25/2018          INTERPRETATION:  Comparison study dated 9/24/2018    Clinical information: Shortness of breath    Portable exam, 8:36 PM    The patient tilted towards the right.    Sternal sutures-mediastinal clips present. Cardiac device overlies left   axilla.    Airspace disease right lung base compatible with pneumonia in the proper   clinical setting.    Left lung normally expanded. No effusion, no vascular congestion. Heart   size within normal limits. No acute osseous abnormalities.    IMPRESSION:    < end of copied text >

## 2018-11-26 NOTE — PROCEDURE NOTE - NSURITECHNIQUE_GU_A_CORE
Proper hand hygiene was performed/A sterile drape was used to cover all adjacent areas/Sterile gloves were worn for the duration of the procedure/The site was cleaned with soap/water and sterile solution (betadine)/The catheter was secured with a securement device (e.g. StatLock)/The urinary drainage system is closed at the end of the procedure/All applicable medical record documentation is completed/The catheter was appropriately lubricated/The collection bag is below the level of the patient and urinary bladder

## 2018-11-26 NOTE — DIETITIAN INITIAL EVALUATION ADULT. - NS AS NUTRI INTERV ENTERAL NUTRITION
As medically feasible, recommend Glucerna 1.2 via NGT to goal of 60ml/hr x 24hrs (to provide 1728kcal, 86g protein)

## 2018-11-26 NOTE — PROGRESS NOTE ADULT - ASSESSMENT
78 year old male with acute hypoxic respiratory failure who has failed BIPAP and has required urgent intubation, sepsis secondary to likely aspiration pneumonia, AMEYA     Critical Care time: 50 mins assessing presenting problems of acute illness that poses high probability of life threatening deterioration or end organ damage/dysfunction.  Medical decision making inculding Initiating plan of care, reviewing data, reviewing radiology,direct patient bedside evaluation and interpretation of vital signs, any necessary ventilator management , discusion with multidisciplinary team, discussing goals of care with patient/family, all non inclusive of procedures

## 2018-11-26 NOTE — CONSULT NOTE ADULT - SUBJECTIVE AND OBJECTIVE BOX
CHIEF COMPLAINT: Sepsis. Need for [place]/bladder drainage    HISTORY OF PRESENT ILLNESS:    78 year old male admitted with fever/sepsis. ER staff unsuccessfully attempted [place] placement.  Patient has no significant urological history according to his wife.    PAST MEDICAL & SURGICAL HISTORY:  Dyslipidemia  Hypothyroidism  Parkinson's disease  Diabetes mellitus  CAD (coronary artery disease) of artery bypass graft  Benign essential hypertension  S/P CABG (coronary artery bypass graft)  Pacemaker      REVIEW OF SYSTEMS:    CONSTITUTIONAL: fever and chills  EYES/ENT: No visual changes;  No vertigo or throat pain   GENITOURINARY: not prior to hospitalization    MEDICATIONS  (STANDING):  piperacillin/tazobactam IVPB. 3.375 Gram(s) IV Intermittent every 8 hours    MEDICATIONS  (PRN):  acetaminophen  Suppository .. 650 milliGRAM(s) Rectal every 6 hours PRN Temp greater or equal to 38.5C (101.3F)      Allergies    No Known Allergies    Intolerances        SOCIAL HISTORY:    FAMILY HISTORY:  No pertinent family history in first degree relatives      Vital Signs Last 24 Hrs  T(C): 39.3 (25 Nov 2018 22:00), Max: 41.2 (25 Nov 2018 20:00)  T(F): 102.7 (25 Nov 2018 22:00), Max: 106.1 (25 Nov 2018 20:00)  HR: 102 (25 Nov 2018 22:00) (99 - 135)  BP: 123/98 (25 Nov 2018 22:00) (91/79 - 156/104)  BP(mean): 105 (25 Nov 2018 22:00) (53 - 112)  RR: 22 (25 Nov 2018 22:00) (22 - 47)  SpO2: 99% (25 Nov 2018 22:00) (90% - 99%)    PHYSICAL EXAM:    Constitutional: NAD, well-developed  Back: Normal spine flexure, Abd: Soft, NT/ND, No CVAT  : Normal phallus,open meatus,bilateral descended testes, no masses. blood per urethra from prior attempts at passing [place]  Extremities: No peripheral edema  Skin: No rashes    LABS:                        14.9   5.85  )-----------( 425      ( 25 Nov 2018 20:27 )             45.8     11-25    124<L>  |  91<L>  |  27<H>  ----------------------------<  697<HH>  3.7   |  17<L>  |  1.52<H>    Ca    7.2<L>      25 Nov 2018 21:33    TPro  4.9<L>  /  Alb  1.7<L>  /  TBili  0.8  /  DBili  x   /  AST  88<H>  /  ALT  12  /  AlkPhos  41  11-25    PT/INR - ( 25 Nov 2018 20:27 )   PT: 18.8 sec;   INR: 1.70 ratio         PTT - ( 25 Nov 2018 20:27 )  PTT:31.3 sec

## 2018-11-26 NOTE — CONSULT NOTE ADULT - SUBJECTIVE AND OBJECTIVE BOX
Reason for Consult :     HPI:  This is an 79 y/o M with PMH of DM, Dyslipidemia, CAD s/p CABG, PPM, Parkinson's Disease with Dementia, Hypothyroidism and BPH who presented with respiratory distress. As per patient's wife at the bedside he was at his baseline condition till 4 days ago when she noticed that he closes his mouth & doesn't allow any thing to be put in his mouth, and she has to use a dripper to feed him. Yesterday she noticed that he is not responding, and today he was breathing fast with audible wheezing, so she brought him to the hospital, no cough, fever, or chills at home, also denies vomiting, no BM over the past 4 days. Wife stated also that patient "lost his voice" a month ago because of his parkinson's disease, but was able to understand and interact non verbally, till yesterday.     In Er he was febrile rectal temp was 106 , hypotensive and had lactate of 5. he was hypoxic initially as placed on BiPAP but later intubated in [facility]. He had to have emergency supra pubic cystostomy as unable to place [place] catheter . He was placed on IV Zosyn     Past Medical & Surgical Hx:  PAST MEDICAL & SURGICAL HISTORY:  Dyslipidemia  Hypothyroidism  Parkinson's disease  Diabetes mellitus  CAD (coronary artery disease) of artery bypass graft  Benign essential hypertension  S/P CABG (coronary artery bypass graft)  Pacemaker      Social History-- Retired lives with family   EtOH: denies   Tobacco: denies   Drug Use: denies     FAMILY HISTORY:  Family history of prostate cancer (Sibling)      Allergies    No Known Allergies    Intolerances        Home/Outpatient  Medications   Home Medications:  Amaryl 2 mg oral tablet: 1 tab(s) orally once a day (24 Sep 2018 14:13)  Aspirin Low Dose 81 mg oral delayed release tablet: 1 tab(s) orally once a day (24 Sep 2018 14:13)  Crestor 10 mg oral tablet: 1 tab(s) orally once a day (at bedtime) (24 Sep 2018 14:13)  metFORMIN 500 mg oral tablet: 1 tab(s) orally 2 times a day (24 Sep 2018 14:13)  metoprolol succinate 25 mg oral tablet, extended release: 1 tab(s) orally once a day (24 Sep 2018 14:13)  Plavix:  orally once a day (24 Sep 2018 14:13)  Sinemet 25 mg-100 mg oral tablet, extended release: 1.5 tab(s) orally 3 times a day (24 Sep 2018 14:13)  Synthroid 125 mcg (0.125 mg) oral tablet: 1 tab(s) orally once a day (24 Sep 2018 14:13)      Current Inpatient Medications :    ANTIBIOTICS:   piperacillin/tazobactam IVPB. 3.375 Gram(s) IV Intermittent every 8 hours      OTHER RELEVANT MEDICATIONS :  acetaminophen  Suppository .. 650 milliGRAM(s) Rectal every 6 hours PRN  aspirin Suppository 300 milliGRAM(s) Rectal daily  dexmedetomidine Infusion 0.3 MICROgram(s)/kG/Hr IV Continuous <Continuous>  dextrose 40% Gel 15 Gram(s) Oral once PRN  dextrose 5%. 1000 milliLiter(s) IV Continuous <Continuous>  dextrose 50% Injectable 12.5 Gram(s) IV Push once  dextrose 50% Injectable 25 Gram(s) IV Push once  dextrose 50% Injectable 25 Gram(s) IV Push once  glucagon  Injectable 1 milliGRAM(s) IntraMuscular once PRN  insulin lispro (HumaLOG) corrective regimen sliding scale   SubCutaneous every 6 hours  levothyroxine Injectable 62.5 MICROGram(s) IV Push at bedtime  metoprolol tartrate Injectable 5 milliGRAM(s) IV Push every 6 hours  sodium chloride 0.9%. 1000 milliLiter(s) IV Continuous <Continuous>          REVIEW OF SYSTEMS:    ROS:  Unable to obtain due to : sedated, intubated         I&O's Detail    25 Nov 2018 07:01  -  26 Nov 2018 07:00  --------------------------------------------------------  IN:    IV PiggyBack: 100 mL    propofol Infusion: 10.8 mL    sodium chloride 0.9%.: 375 mL  Total IN: 485.8 mL    OUT:  Total OUT: 0 mL    Total NET: 485.8 mL        Mode: AC/ CMV (Assist Control/ Continuous Mandatory Ventilation), RR (machine): 14, TV (machine): 400, FiO2: 50, PEEP: 5, ITime: 1, PIP: 16    Physical Exam:  Vital Signs Last 24 Hrs  T(C): 36.6 (26 Nov 2018 06:30), Max: 41.2 (25 Nov 2018 20:00)  T(F): 97.9 (26 Nov 2018 06:30), Max: 106.1 (25 Nov 2018 20:00)  HR: 88 (26 Nov 2018 06:39) (77 - 135)  BP: 212/188 (26 Nov 2018 06:30) (91/79 - 212/188)  BP(mean): 197 (26 Nov 2018 06:30) (53 - 197)  RR: 36 (26 Nov 2018 06:30) (22 - 47)  SpO2: 93% (26 Nov 2018 06:39) (61% - 99%)  Height (cm): 170.18 (11-25 @ 20:00)  Weight (kg): 60.3 (11-25 @ 20:00)  BMI (kg/m2): 20.8 (11-25 @ 20:00)  BSA (m2): 1.7 (11-25 @ 20:00)      GEN: cachectic , intubated  HEENT: normocephalic and atraumatic. EOMI. VIKAS. Moist mucosa. Clear Posterior pharynx.  NECK: Supple. No carotid bruits.  No lymphadenopathy or thyromegaly.  LUNGS: Coarse breath sounds on auscultation.  HEART: Regular rate and rhythm without murmur.  ABDOMEN: Soft, nontender, and nondistended.  Positive bowel sounds.  No hepatosplenomegaly was noted. supra pubic cystostomy - gross hematuria   EXTREMITIES: Without any cyanosis, clubbing, rash, lesions or edema.  NEUROLOGIC: sedated, intubated    SKIN: No ulceration or induration present.      Labs:               14.1   4.66   )----------(  260       ( 26 Nov 2018 06:52 )               42.9      137    |  100    |  31     ----------------------------<  255        ( 26 Nov 2018 06:52 )  3.2     |  22     |  1.64     Ca    8.3        ( 26 Nov 2018 06:52 )  Phos  2.9       ( 26 Nov 2018 06:52 )  Mg     2.1       ( 26 Nov 2018 06:52 )    TPro  6.6    /  Alb  2.5    /  TBili  2.1    /  DBili  x      /  AST  238    /  ALT  79     /  AlkPhos  57     ( 26 Nov 2018 06:52 )    LIVER FUNCTIONS - ( 26 Nov 2018 06:52 )  Alb: 2.5 g/dL / Pro: 6.6 g/dL / ALK PHOS: 57 U/L / ALT: 79 U/L DA / AST: 238 U/L / GGT: x           PT/INR -  18.4 sec / 1.66 ratio   ( 26 Nov 2018 06:52 )       PTT -  31.3 sec   ( 25 Nov 2018 20:27 )    CAPILLARY BLOOD GLUCOSE      POCT Blood Glucose.: 204 mg/dL (26 Nov 2018 06:28)  POCT Blood Glucose.: 253 mg/dL (26 Nov 2018 01:51)    Lactate, Blood: 4.3 mmol/L (11-26-18 @ 03:31)  Lactate, Blood: 5.0 mmol/L (11-25-18 @ 21:33)      ABG - ( 26 Nov 2018 03:47 )  pH, Arterial: x     pH, Blood: 7.40  /  pCO2: 14    /  pO2: 174   / HCO3: 12    / Base Excess: -15.8 /  SaO2: 99            RECENT CULTURES:  Rapid Respiratory Viral Panel (11.25.18 @ 20:27)    Rapid RVP Result: NotDetec:      RADIOLOGY & ADDITIONAL STUDIES:   Xray Chest 1 View- PORTABLE-Urgent (11.25.18 @ 21:25) >  The patient tilted towards the right.    Sternal sutures-mediastinal clips present. Cardiac device overlies left   axilla.    Airspace disease right lung base compatible with pneumonia in the proper   clinical setting.    Left lung normally expanded. No effusion, no vascular congestion. Heart   size within normal limits. No acute osseous abnormalities.    IMPRESSION:    See above report    CT Head No Cont (09.24.18 @ 15:19) >    IMPRESSION:     Head CT: No acute intracranial hemorrhage, mass effect, or shift of the   midline structures.    Unchanged small area of encephalomalacia and gliosis within the right   anterior temporal pole related to prior insult.    Cervical spine CT: No acute fractures or dislocations.    Multilevel cervical spondylosis.        Assessment :     79 y/o M with PMH of DM, Dyslipidemia, CAD s/p CABG, PPM, Parkinson's Disease with Dementia, Hypothyroidism and BPH who presented with respiratory distress.  Has acute hypoxic respiratory failure secondary to  pneumonia likely aspiration as he has parkinson's diseases. Could have severe CAP too He was febrile and hypoxic , likely has severe sepsis with septic shock. He has been not on any pressors . Based on his presentation he will likely have positive blood cs     Plan :   - send sputum cs and urine legionella antigen   - add Zithromax to zosyn   - send PBNP and trend lactate   - follow cs results   - will contact family for more details       Continue with present regime .  Appropriate use of antibiotics and adverse effects reviewed.      I have discussed the above plan of care with patient's family in detail. They expressed understanding of the treatment plan . Risks, benefits and alternatives discussed in detail. I have asked if they have any questions or concerns and appropriately addressed them to the best of my ability .      > 75 minutes spent in direct patient care reviewing  the notes, lab data/ imaging , discussion with multidisciplinary team. All questions were addressed and answered to the best of my capacity .    Thank you for allowing me to participate in the care of your patient .

## 2018-11-26 NOTE — PROGRESS NOTE ADULT - PROBLEM SELECTOR PLAN 3
30 cc/kg fluid challenge finished and still holding improvement in blood pressure  - will continue to have low threshold for Levophed given current volume status, will titrate for MAP 65-70  -repeat lactate lactate 4.3 will repeat again with morning labs   -blood/urine/sputum cultures, then initiate broad spectrum antibiotics  -follow cultures and narrow antibitoics as able  -goal UOP >0.5 cc/kg/hr  -obtain procalcitonin.

## 2018-11-26 NOTE — CONSULT NOTE ADULT - SUBJECTIVE AND OBJECTIVE BOX
PULMONARY/CRITICAL CARE        Patient is a 78y old  Male who presents with a chief complaint of Respiratory distress. (26 Nov 2018 03:15)    BRIEF HOSPITAL COURSE: ***This is an 77 y/o M with PMH of DM, Dyslipidemia, CAD s/p CABG, PPM, Parkinson's Disease with Dementia, Hypothyroidism and BPH who presented with respiratory distress. As per patient's wife at the bedside he was at his baseline condition till 4 days ago when she noticed that he closes his mouth & doesn't allow any thing to be put in his mouth, and she has to use a dripper to feed him. Yesterday she noticed that he is not responding, and today he was breathing fast with audible wheezing, so she brought him to the hospital, no cough, fever, or chills at home, also denies vomiting, no BM over the past 4 days. Wife stated also that patient "lost his voice" a month ago because of his parkinson's disease, but was able to understand and interact non verbally, till yesterday.     Events last 24 hours: ***Pt intubated this am for respiratory distess. Has Metabolic acidosis, hi lactate, hi glucose, sepsis and pneumonia.    PAST MEDICAL & SURGICAL HISTORY:  Dyslipidemia  Hypothyroidism  Parkinson's disease  Diabetes mellitus  CAD (coronary artery disease) of artery bypass graft  Benign essential hypertension  S/P CABG (coronary artery bypass graft)  Pacemaker  Wife denies TB  Allergies    No Known Allergies    Intolerances      FAMILY HISTORY and SOCIAL: born [place], here 40yrs. 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 No nausea, vomiting, or hematemesis; No diarrhea or constipation. No melena or hematochezia.  GENITOURINARY: No dysuria, frequency, hematuria, or incontinence  NEUROLOGICAL: No headaches, has memory loss, loss of strength, SKIN: No itching, burning, rashes, or lesions   MUSCULOSKELETAL: No joint pain or swelling; No muscle, back, or extremity pain  PSYCHIATRIC: No depression,       Medications:  piperacillin/tazobactam IVPB. 3.375 Gram(s) IV Intermittent every 8 hours    metoprolol tartrate Injectable 5 milliGRAM(s) IV Push every 6 hours      acetaminophen  Suppository .. 650 milliGRAM(s) Rectal every 6 hours PRN  aspirin Suppository 300 milliGRAM(s) Rectal daily  propofol Infusion 5 MICROgram(s)/kG/Min IV Continuous <Continuous>            dextrose 40% Gel 15 Gram(s) Oral once PRN  dextrose 50% Injectable 12.5 Gram(s) IV Push once  dextrose 50% Injectable 25 Gram(s) IV Push once  dextrose 50% Injectable 25 Gram(s) IV Push once  glucagon  Injectable 1 milliGRAM(s) IntraMuscular once PRN  insulin lispro (HumaLOG) corrective regimen sliding scale   SubCutaneous every 6 hours  levothyroxine Injectable 62.5 MICROGram(s) IV Push at bedtime    dextrose 5%. 1000 milliLiter(s) IV Continuous <Continuous>  sodium chloride 0.9%. 1000 milliLiter(s) IV Continuous <Continuous>            Mode: AC/ CMV (Assist Control/ Continuous Mandatory Ventilation)  RR (machine): 14  TV (machine): 400  FiO2: 100  PEEP: 5  ITime: 1  PIP: 24      ICU Vital Signs Last 24 Hrs  T(C): 37.4 (26 Nov 2018 04:30), Max: 41.2 (25 Nov 2018 20:00)  T(F): 99.4 (26 Nov 2018 04:30), Max: 106.1 (25 Nov 2018 20:00)  HR: 91 (26 Nov 2018 04:30) (77 - 135)  BP: 111/80 (26 Nov 2018 04:30) (91/79 - 181/95)  BP(mean): 92 (26 Nov 2018 04:30) (53 - 127)  ABP: --  ABP(mean): --  RR: 37 (26 Nov 2018 04:30) (22 - 47)  SpO2: 82% (26 Nov 2018 04:00) (80% - 99%)    Vital Signs Last 24 Hrs  T(C): 37.4 (26 Nov 2018 04:30), Max: 41.2 (25 Nov 2018 20:00)  T(F): 99.4 (26 Nov 2018 04:30), Max: 106.1 (25 Nov 2018 20:00)  HR: 91 (26 Nov 2018 04:30) (77 - 135)  BP: 111/80 (26 Nov 2018 04:30) (91/79 - 181/95)  BP(mean): 92 (26 Nov 2018 04:30) (53 - 127)  RR: 37 (26 Nov 2018 04:30) (22 - 47)  SpO2: 82% (26 Nov 2018 04:00) (80% - 99%)    ABG - ( 26 Nov 2018 03:47 )  pH, Arterial: x     pH, Blood: 7.40  /  pCO2: 14    /  pO2: 174   / HCO3: 12    / Base Excess: -15.8 /  SaO2: 99                  I&O's Detail    25 Nov 2018 07:01  -  26 Nov 2018 06:38  --------------------------------------------------------  IN:    propofol Infusion: 3.6 mL  Total IN: 3.6 mL    OUT:  Total OUT: 0 mL    Total NET: 3.6 mL            LABS:                        14.9   5.85  )-----------( 425      ( 25 Nov 2018 20:27 )             45.8     11-25    124<L>  |  91<L>  |  27<H>  ----------------------------<  697<HH>  3.7   |  17<L>  |  1.52<H>    Ca    7.2<L>      25 Nov 2018 21:33    TPro  4.9<L>  /  Alb  1.7<L>  /  TBili  0.8  /  DBili  x   /  AST  88<H>  /  ALT  12  /  AlkPhos  41  11-25          CAPILLARY BLOOD GLUCOSE      POCT Blood Glucose.: 204 mg/dL (26 Nov 2018 06:28)    PT/INR - ( 25 Nov 2018 20:27 )   PT: 18.8 sec;   INR: 1.70 ratio         PTT - ( 25 Nov 2018 20:27 )  PTT:31.3 sec    CULTURES:      Physical Examination:Thin elderly male sedated on vent. on Propofol    General: moving left hand, mild tachypnea    HEENT: Pupils equal, reactive to light.  Symmetric.    PULM: few rhonchi bilat, good excursion    CVS: Regular rate and rhythm, no murmurs, rubs, or gallops    ABD: Soft, nondistended, nontender, normoactive bowel sounds, no masses  Suprapubic catheter    EXT: No edema, nontender    SKIN: Warm and well perfused, no rashes noted.    NEURO: sedated, moves left hand with rigid limbs.    RADIOLOGY: ***  < from: CT Cervical Spine No Cont (09.24.18 @ 15:19) >  EXAM:  CT BRAIN                          EXAM:  CT CERVICAL SPINE                                  PROCEDURE DATE:  09/24/2018          INTERPRETATION:  .    CLINICAL INFORMATION: Status post fall. Trauma.    TECHNIQUE: Transaxial CT images were obtained through the cervical spine   and head without the administration of IV contrast. Sagittal and coronal   reformatted images were obtained from the source data.    COMPARISON: Comparison is made to a prior head CT examination from   11/27/2015.    FINDINGS:     NONCONTRAST CERVICAL SPINE CT: No acute fractures or dislocations are   notable. The cervical alignment is maintained. There is no loss of   vertebral body height. Scattered degenerative lucencies are areas of   sclerosis are notable throughout the vertebral bodies and facets. There   is degenerative disc disease at C4-C5, C5-C6 and at C6-C7 with reactive   endplate changes. Multilevel facet arthrosis is notable. The dens is   intact. The lateral masses of C1 are not displaced. There is no   prevertebral soft tissue swelling.    There are variable degrees of multilevel canal and foraminal stenosis   secondary to endplate degenerative changes and facet arthrosis.    There is atherosclerosis of the left carotid bifurcation. Thereis   biapical pleural thickening and/or scarring. A right-sided azygous   fissure is noted. There is a cardiac pacemaker overlying the left chest   wall. The patient is status post median sternotomy. Multiple mediastinal   clips are noted.    NONCONTRAST HEAD CT: There is no acute intracranial hemorrhage, mass   effect, shift of the midline structures, herniation, extra-axial fluid   collection, or hydrocephalus.    There is unchanged encephalomalacia and gliosis within the right anterior   temporal pole related to prior insult. There is associated ex vacuo   dilatation of the right temporal horn of the lateral ventricle which also   appears unchanged.    There is diffuse cerebral volume loss with prominence of the sulci,   fissures, and cisternal spaces which is normal for the patient's age.   There is mild deep and periventricular white matter hypoattenuation   statistically compatible with microvascular changes given calcific   atherosclerotic disease of the intracranial arteries.    The paranasal sinuses and mastoid air cells are clear. The calvarium is   intact. There is evidence of bilateral cataract removal.    IMPRESSION:     Head CT: No acute intracranial hemorrhage, mass effect, or shift of the   midline structures.    Unchanged small area of encephalomalacia and gliosis within the right   anterior temporal pole related to prior insult.    Cervical spine CT: No acute fractures or dislocations.    Multilevel cervical spondylosis.                  ERMA RUCKER M.D., ATTENDING RADIOLOGIST  This document has been electronically signed. Sep 24 2018  3:30PM    < end of copied text >      CXR bilat infiltrates  CRITICAL CARE TIME SPENT: ***

## 2018-11-26 NOTE — DIETITIAN INITIAL EVALUATION ADULT. - OTHER INFO
78 year old male adm from home c PNA (likely asp) and sepsis.  Pt intubated this am, NPO status maintained c IVF@75ml/hr infusing; aslo on precedex drip.  NGT placed to LWS; will likely require NGT feedings as pt is intubated and remains full code per rounds.  Nutrition consult ordered for pressure ulcers (noted sacrum stage IIm, coccyx stage II, L hip stage I, L phalanfes/top of foot- unstaged).  Noted no BM x4 days PTA.  PMHx significant for T2DM, current A1c pending; on hss.  SLP consult pending when pt is extubated.  As medically feasible, recommend Glucerna 1.2 via NGT to goal of 60ml/hr x 24hrs (to provide 1728kcal, 86g protein).

## 2018-11-26 NOTE — CONSULT NOTE ADULT - ATTENDING COMMENTS
Management plan was discussed with patient's wife at the bedside, she understands & agrees. Patient is full code.  Pt is critically ill at high risk of death and deterioration.  Requires ICU care  Reviewed all notes, xrays, labs  Prognosis guarded for eventual recovery

## 2018-11-26 NOTE — DIETITIAN INITIAL EVALUATION ADULT. - PERTINENT LABORATORY DATA
11/26 lactate 5.8, k 3.2, bun 3.2, creat 1.64 -johnny, likely 2/2 decrease po intake, dehydration, alb 2.5, bilirubin 2.1, ast 238, alt 79, fs 204, 253

## 2018-11-26 NOTE — DIETITIAN INITIAL EVALUATION ADULT. - PHYSICAL APPEARANCE
underweight/BMI 20.8 (adm ht 67in, wt 132#),+ mild-moderate muscle wasting in clavicle, mild temporal wasting; wt loss PTA likely, no family present at bedside

## 2018-11-26 NOTE — PROVIDER CONTACT NOTE (EICU) - RECOMMENDATIONS
Will intubate given worseing respiratory distress. Zosyn for presumed aspiration PNA in setting of parkinsons. Check urine legionella, RVP, Blood and sputum culture.  D/W BARI Vyas

## 2018-11-26 NOTE — PROVIDER CONTACT NOTE (EICU) - BACKGROUND
This is an 77 y/o M with PMH of DM, Dyslipidemia, CAD s/p CABG, PPM, Parkinson's Disease with Dementia, Hypothyroidism and BPH who presented with respiratory distress. As per patient's wife at the bedside he was at his baseline condition till 4 days ago when she noticed that he closes his mouth & doesn't allow any thing to be put in his mouth, and she has to use a dripper to feed him. Yesterday she noticed that he is not responding, and today he was breathing fast with audible wheezing, so she brought him to the hospital, no cough, fever, or chills at home, also denies vomiting, no BM over the past 4 days. Wife stated also that patient "lost his voice" a month ago because of his parkinson's disease, but was able to understand and interact non verbally, till yesterday.   Suprapubic cath placed in ER

## 2018-11-26 NOTE — PROGRESS NOTE ADULT - SUBJECTIVE AND OBJECTIVE BOX
78y  Male  No Known Allergies    CC: Patient is a 78y old  Male who presents with a chief complaint of Respiratory distress.     HPI:   78 year old male who lives at home, wife at bedside states he has been weak with decreased appetite for past few days but today started with lethargy and SOB tonight with fever at home. In the emergency room he presented  lethargic ( opens eyes to voice but not following commands) hypoxic on pulseox and SOB with increased work of breathing, accessory muscle use and respiratory rate in the mid 30's. He was placed on non invasive ventilation and post BIAPA abg showing respiratory alkalosis with PCo2 455 on 100% Fio2.  IVC was found to be non compressible with respiratory variation indicating  volume overloaded, He had received  his 30cc/kg of fluid resuscitation for sepsis guidelines and was admitted to SPCU unit on bipap.    I was called to patients bedside for increased increased work of breathing with accessory muscle use and increased tachypnea. BIPAP settings were increased but work fo breathing continued and pt began to become hypoxic so he was urgently intubated by respiratory therapy under my supervision and placed on assist control with 100% fio2. Ng tube was also placed and stat ABG was ordered. Chest xray was obtained and read by myself at bedside off the xray machines mobile monitor and found the ET tube to be appropriately placed 2 -3 cm above the kourtney and Ng tube inlace in the distal stomach which was attatched to low intermit wall suction.    Note chaves insertion was complicated and subrapubic chaves cath was placed by urology.       PAST MEDICAL & SURGICAL HISTORY:  Dyslipidemia  Hypothyroidism  Parkinson's disease  Diabetes mellitus  CAD (coronary artery disease) of artery bypass graft  Benign essential hypertension  S/P CABG (coronary artery bypass graft)  Pacemaker    FAMILY HISTORY:  Family history of prostate cancer (Sibling)    Vital Signs Last 24 Hrs  T(C): 38.4 (26 Nov 2018 04:08), Max: 41.2 (25 Nov 2018 20:00)  T(F): 101.1 (26 Nov 2018 04:08), Max: 106.1 (25 Nov 2018 20:00)  HR: 94 (26 Nov 2018 03:33) (94 - 135)  BP: 95/77 (26 Nov 2018 03:33) (91/79 - 181/95)  BP(mean): 83 (26 Nov 2018 03:33) (53 - 119)  RR: 39 (26 Nov 2018 03:33) (22 - 47)  SpO2: 94% (26 Nov 2018 03:33) (80% - 99%)  ABG - ( 26 Nov 2018 03:47 )  pH, Arterial: x     pH, Blood: 7.40  /  pCO2: 14    /  pO2: 174   / HCO3: 12    / Base Excess: -15.8 /  SaO2: 99        I&O's Summary  25 Nov 2018 07:01  -  26 Nov 2018 04:20  --------------------------------------------------------  IN: 1.8 mL / OUT: 0 mL / NET: 1.8 mL    LABS    124<L>  |  91<L>  |  27<H>  ----------------------------<  697<HH>  3.7   |  17<L>  |  1.52<H>    Ca    7.2<L>      25 Nov 2018 21:33    TPro  4.9<L>  /  Alb  1.7<L>  /  TBili  0.8  /  DBili  x   /  AST  88<H>  /  ALT  12  /  AlkPhos  41  11-25                        14.9   5.85  )-----------( 425      ( 25 Nov 2018 20:27 )             45.8     PT/INR - ( 25 Nov 2018 20:27 )   PT: 18.8 sec;   INR: 1.70 ratio     PTT - ( 25 Nov 2018 20:27 )  PTT:31.3 sec    LIVER FUNCTIONS - ( 25 Nov 2018 21:33 )  Alb: 1.7 g/dL / Pro: 4.9 g/dL / ALK PHOS: 41 U/L / ALT: 12 U/L DA / AST: 88 U/L / GGT: x           CAPILLARY BLOOD GLUCOSE  POCT Blood Glucose.: 253 mg/dL (26 Nov 2018 01:51)      VENT SETTINGS   AC tv 400 rate 14 peep 5 FIO2 100%     MEDICATIONS  (STANDING):  aspirin Suppository 300 milliGRAM(s) Rectal daily  dextrose 5%. 1000 milliLiter(s) (50 mL/Hr) IV Continuous <Continuous>  dextrose 50% Injectable 12.5 Gram(s) IV Push once  dextrose 50% Injectable 25 Gram(s) IV Push once  dextrose 50% Injectable 25 Gram(s) IV Push once  insulin lispro (HumaLOG) corrective regimen sliding scale   SubCutaneous every 6 hours  levothyroxine Injectable 62.5 MICROGram(s) IV Push at bedtime  metoprolol tartrate Injectable 5 milliGRAM(s) IV Push every 6 hours  piperacillin/tazobactam IVPB. 3.375 Gram(s) IV Intermittent every 8 hours  propofol Infusion 5 MICROgram(s)/kG/Min (1.809 mL/Hr) IV Continuous <Continuous>      REVIEW OF SYSTEMS:    Unable to obtain due to mechnical ventilation, poor mental status     Physicial Exam:     Constitutional: NAD, well-groomed, well-developed  HEENT: PERRLA, EOMI, no drainage or redness  Neck: No bruits; no thyromegaly or nodules,  No JVD  Back: Normal spine flexure, No CVA tenderness, No deformity or limitation of movement  Respiratory: Breath Sounds equal & clear to percussion & auscultation, no accessory muscle use  Cardiovascular: Regular rate & rhythm, normal S1, S2; no murmurs, gallops or rubs; no S3, S4  Gastrointestinal: Soft, non-tender, non distended no hepatosplenomegaly, normal bowel sounds  Extremities: No peripheral edema, No cyanosis, clubbing   Vascular: Equal and normal pulses: 2+ peripheral pulses throughout  Neurological: GCS:    A&O x 3; no sensory, motor  deficits, normal reflexes  Psychiatric: Normal mood, normal affect  Musculoskeletal: No joint pain, swelling or deformity; no limitation of movement  Skin: No rashes

## 2018-11-27 DIAGNOSIS — R74.8 ABNORMAL LEVELS OF OTHER SERUM ENZYMES: ICD-10-CM

## 2018-11-27 LAB
ALBUMIN SERPL ELPH-MCNC: 2.8 G/DL — LOW (ref 3.3–5)
ALP SERPL-CCNC: 79 U/L — SIGNIFICANT CHANGE UP (ref 30–120)
ALT FLD-CCNC: 44 U/L DA — SIGNIFICANT CHANGE UP (ref 10–60)
ANION GAP SERPL CALC-SCNC: 14 MMOL/L — SIGNIFICANT CHANGE UP (ref 5–17)
ANISOCYTOSIS BLD QL: SLIGHT — SIGNIFICANT CHANGE UP
AST SERPL-CCNC: 355 U/L — HIGH (ref 10–40)
BASOPHILS # BLD AUTO: 0.03 K/UL — SIGNIFICANT CHANGE UP (ref 0–0.2)
BASOPHILS NFR BLD AUTO: 0.2 % — SIGNIFICANT CHANGE UP (ref 0–2)
BILIRUB SERPL-MCNC: 1.2 MG/DL — SIGNIFICANT CHANGE UP (ref 0.2–1.2)
BUN SERPL-MCNC: 25 MG/DL — HIGH (ref 7–23)
BURR CELLS BLD QL SMEAR: PRESENT — SIGNIFICANT CHANGE UP
CALCIUM SERPL-MCNC: 7.7 MG/DL — LOW (ref 8.4–10.5)
CHLORIDE SERPL-SCNC: 99 MMOL/L — SIGNIFICANT CHANGE UP (ref 96–108)
CO2 SERPL-SCNC: 22 MMOL/L — SIGNIFICANT CHANGE UP (ref 22–31)
CREAT SERPL-MCNC: 1.13 MG/DL — SIGNIFICANT CHANGE UP (ref 0.5–1.3)
EOSINOPHIL # BLD AUTO: 0.08 K/UL — SIGNIFICANT CHANGE UP (ref 0–0.5)
EOSINOPHIL NFR BLD AUTO: 0.6 % — SIGNIFICANT CHANGE UP (ref 0–6)
GLUCOSE SERPL-MCNC: 173 MG/DL — HIGH (ref 70–99)
HBA1C BLD-MCNC: 8.2 % — HIGH (ref 4–5.6)
HCT VFR BLD CALC: 34.1 % — LOW (ref 39–50)
HGB BLD-MCNC: 11.2 G/DL — LOW (ref 13–17)
IMM GRANULOCYTES NFR BLD AUTO: 1.3 % — SIGNIFICANT CHANGE UP (ref 0–1.5)
LACTATE SERPL-SCNC: 3 MMOL/L — HIGH (ref 0.7–2)
LACTATE SERPL-SCNC: 3.1 MMOL/L — HIGH (ref 0.7–2)
LYMPHOCYTES # BLD AUTO: 1.37 K/UL — SIGNIFICANT CHANGE UP (ref 1–3.3)
LYMPHOCYTES # BLD AUTO: 10.1 % — LOW (ref 13–44)
MAGNESIUM SERPL-MCNC: 1.8 MG/DL — SIGNIFICANT CHANGE UP (ref 1.6–2.6)
MANUAL SMEAR VERIFICATION: SIGNIFICANT CHANGE UP
MCHC RBC-ENTMCNC: 27.1 PG — SIGNIFICANT CHANGE UP (ref 27–34)
MCHC RBC-ENTMCNC: 32.8 GM/DL — SIGNIFICANT CHANGE UP (ref 32–36)
MCV RBC AUTO: 82.6 FL — SIGNIFICANT CHANGE UP (ref 80–100)
MONOCYTES # BLD AUTO: 0.3 K/UL — SIGNIFICANT CHANGE UP (ref 0–0.9)
MONOCYTES NFR BLD AUTO: 2.2 % — SIGNIFICANT CHANGE UP (ref 2–14)
NEUTROPHILS # BLD AUTO: 11.65 K/UL — HIGH (ref 1.8–7.4)
NEUTROPHILS NFR BLD AUTO: 85.6 % — HIGH (ref 43–77)
NEUTS BAND # BLD: 6 % — SIGNIFICANT CHANGE UP (ref 0–8)
NRBC # BLD: 0 /100 WBCS — SIGNIFICANT CHANGE UP (ref 0–0)
PHOSPHATE SERPL-MCNC: 2.1 MG/DL — LOW (ref 2.5–4.5)
PLAT MORPH BLD: ABNORMAL
PLATELET # BLD AUTO: 223 K/UL — SIGNIFICANT CHANGE UP (ref 150–400)
POIKILOCYTOSIS BLD QL AUTO: SLIGHT — SIGNIFICANT CHANGE UP
POTASSIUM SERPL-MCNC: 3.1 MMOL/L — LOW (ref 3.5–5.3)
POTASSIUM SERPL-SCNC: 3.1 MMOL/L — LOW (ref 3.5–5.3)
PROT SERPL-MCNC: 6.4 G/DL — SIGNIFICANT CHANGE UP (ref 6–8.3)
RBC # BLD: 4.13 M/UL — LOW (ref 4.2–5.8)
RBC # FLD: 13.4 % — SIGNIFICANT CHANGE UP (ref 10.3–14.5)
RBC BLD AUTO: SIGNIFICANT CHANGE UP
SMUDGE CELLS # BLD: PRESENT — SIGNIFICANT CHANGE UP
SODIUM SERPL-SCNC: 135 MMOL/L — SIGNIFICANT CHANGE UP (ref 135–145)
SPHEROCYTES BLD QL SMEAR: SLIGHT — SIGNIFICANT CHANGE UP
TROPONIN I SERPL-MCNC: 0.1 NG/ML — HIGH (ref 0.02–0.06)
WBC # BLD: 13.6 K/UL — HIGH (ref 3.8–10.5)
WBC # FLD AUTO: 13.6 K/UL — HIGH (ref 3.8–10.5)

## 2018-11-27 PROCEDURE — 99233 SBSQ HOSP IP/OBS HIGH 50: CPT

## 2018-11-27 PROCEDURE — 99223 1ST HOSP IP/OBS HIGH 75: CPT

## 2018-11-27 PROCEDURE — 71045 X-RAY EXAM CHEST 1 VIEW: CPT | Mod: 26

## 2018-11-27 RX ORDER — MIDAZOLAM HYDROCHLORIDE 1 MG/ML
1 INJECTION, SOLUTION INTRAMUSCULAR; INTRAVENOUS
Qty: 0 | Refills: 0 | Status: DISCONTINUED | OUTPATIENT
Start: 2018-11-27 | End: 2018-12-02

## 2018-11-27 RX ORDER — POTASSIUM CHLORIDE 20 MEQ
10 PACKET (EA) ORAL
Qty: 0 | Refills: 0 | Status: COMPLETED | OUTPATIENT
Start: 2018-11-27 | End: 2018-11-27

## 2018-11-27 RX ORDER — MIDAZOLAM HYDROCHLORIDE 1 MG/ML
2 INJECTION, SOLUTION INTRAMUSCULAR; INTRAVENOUS DAILY
Qty: 0 | Refills: 0 | Status: DISCONTINUED | OUTPATIENT
Start: 2018-11-27 | End: 2018-11-27

## 2018-11-27 RX ORDER — SODIUM CHLORIDE 9 MG/ML
1000 INJECTION INTRAMUSCULAR; INTRAVENOUS; SUBCUTANEOUS
Qty: 0 | Refills: 0 | Status: DISCONTINUED | OUTPATIENT
Start: 2018-11-27 | End: 2018-12-02

## 2018-11-27 RX ORDER — LEVOTHYROXINE SODIUM 125 MCG
125 TABLET ORAL DAILY
Qty: 0 | Refills: 0 | Status: DISCONTINUED | OUTPATIENT
Start: 2018-11-27 | End: 2018-12-07

## 2018-11-27 RX ORDER — ASPIRIN/CALCIUM CARB/MAGNESIUM 324 MG
325 TABLET ORAL DAILY
Qty: 0 | Refills: 0 | Status: DISCONTINUED | OUTPATIENT
Start: 2018-11-27 | End: 2018-12-04

## 2018-11-27 RX ORDER — MIDAZOLAM HYDROCHLORIDE 1 MG/ML
2 INJECTION, SOLUTION INTRAMUSCULAR; INTRAVENOUS ONCE
Qty: 0 | Refills: 0 | Status: DISCONTINUED | OUTPATIENT
Start: 2018-11-27 | End: 2018-11-27

## 2018-11-27 RX ORDER — PANTOPRAZOLE SODIUM 20 MG/1
40 TABLET, DELAYED RELEASE ORAL DAILY
Qty: 0 | Refills: 0 | Status: DISCONTINUED | OUTPATIENT
Start: 2018-11-27 | End: 2018-12-07

## 2018-11-27 RX ORDER — SODIUM,POTASSIUM PHOSPHATES 278-250MG
1 POWDER IN PACKET (EA) ORAL
Qty: 0 | Refills: 0 | Status: COMPLETED | OUTPATIENT
Start: 2018-11-27 | End: 2018-11-28

## 2018-11-27 RX ORDER — CHLORHEXIDINE GLUCONATE 213 G/1000ML
15 SOLUTION TOPICAL
Qty: 0 | Refills: 0 | Status: DISCONTINUED | OUTPATIENT
Start: 2018-11-27 | End: 2018-12-01

## 2018-11-27 RX ORDER — ACETAMINOPHEN 500 MG
650 TABLET ORAL EVERY 6 HOURS
Qty: 0 | Refills: 0 | Status: DISCONTINUED | OUTPATIENT
Start: 2018-11-27 | End: 2018-12-05

## 2018-11-27 RX ADMIN — Medication 100 MILLIEQUIVALENT(S): at 09:26

## 2018-11-27 RX ADMIN — MIDAZOLAM HYDROCHLORIDE 2 MILLIGRAM(S): 1 INJECTION, SOLUTION INTRAMUSCULAR; INTRAVENOUS at 05:53

## 2018-11-27 RX ADMIN — MIDODRINE HYDROCHLORIDE 5 MILLIGRAM(S): 2.5 TABLET ORAL at 13:26

## 2018-11-27 RX ADMIN — Medication 650 MILLIGRAM(S): at 12:01

## 2018-11-27 RX ADMIN — CARBIDOPA AND LEVODOPA 1.5 TABLET(S): 25; 100 TABLET ORAL at 05:13

## 2018-11-27 RX ADMIN — Medication 100 MILLIEQUIVALENT(S): at 12:17

## 2018-11-27 RX ADMIN — CHLORHEXIDINE GLUCONATE 15 MILLILITER(S): 213 SOLUTION TOPICAL at 05:53

## 2018-11-27 RX ADMIN — Medication 50 MILLILITER(S): at 05:13

## 2018-11-27 RX ADMIN — Medication 100 MILLIEQUIVALENT(S): at 10:40

## 2018-11-27 RX ADMIN — Medication 50 MILLILITER(S): at 21:26

## 2018-11-27 RX ADMIN — MIDODRINE HYDROCHLORIDE 5 MILLIGRAM(S): 2.5 TABLET ORAL at 21:26

## 2018-11-27 RX ADMIN — SODIUM CHLORIDE 80 MILLILITER(S): 9 INJECTION, SOLUTION INTRAVENOUS at 05:18

## 2018-11-27 RX ADMIN — MIDODRINE HYDROCHLORIDE 5 MILLIGRAM(S): 2.5 TABLET ORAL at 05:13

## 2018-11-27 RX ADMIN — PIPERACILLIN AND TAZOBACTAM 25 GRAM(S): 4; .5 INJECTION, POWDER, LYOPHILIZED, FOR SOLUTION INTRAVENOUS at 05:13

## 2018-11-27 RX ADMIN — SODIUM CHLORIDE 75 MILLILITER(S): 9 INJECTION INTRAMUSCULAR; INTRAVENOUS; SUBCUTANEOUS at 22:26

## 2018-11-27 RX ADMIN — Medication 4: at 12:27

## 2018-11-27 RX ADMIN — CARBIDOPA AND LEVODOPA 1.5 TABLET(S): 25; 100 TABLET ORAL at 17:19

## 2018-11-27 RX ADMIN — Medication 650 MILLIGRAM(S): at 13:50

## 2018-11-27 RX ADMIN — MIDAZOLAM HYDROCHLORIDE 1 MILLIGRAM(S): 1 INJECTION, SOLUTION INTRAMUSCULAR; INTRAVENOUS at 12:16

## 2018-11-27 RX ADMIN — Medication 300 MILLIGRAM(S): at 12:01

## 2018-11-27 RX ADMIN — Medication 50 MILLILITER(S): at 13:51

## 2018-11-27 RX ADMIN — AZITHROMYCIN 255 MILLIGRAM(S): 500 TABLET, FILM COATED ORAL at 21:02

## 2018-11-27 RX ADMIN — Medication 1 PACKET(S): at 17:18

## 2018-11-27 RX ADMIN — PIPERACILLIN AND TAZOBACTAM 25 GRAM(S): 4; .5 INJECTION, POWDER, LYOPHILIZED, FOR SOLUTION INTRAVENOUS at 21:26

## 2018-11-27 RX ADMIN — ATORVASTATIN CALCIUM 40 MILLIGRAM(S): 80 TABLET, FILM COATED ORAL at 21:26

## 2018-11-27 RX ADMIN — CHLORHEXIDINE GLUCONATE 15 MILLILITER(S): 213 SOLUTION TOPICAL at 17:19

## 2018-11-27 RX ADMIN — Medication 6: at 17:34

## 2018-11-27 RX ADMIN — SODIUM CHLORIDE 75 MILLILITER(S): 9 INJECTION INTRAMUSCULAR; INTRAVENOUS; SUBCUTANEOUS at 11:17

## 2018-11-27 RX ADMIN — CLOPIDOGREL BISULFATE 75 MILLIGRAM(S): 75 TABLET, FILM COATED ORAL at 12:01

## 2018-11-27 RX ADMIN — PANTOPRAZOLE SODIUM 40 MILLIGRAM(S): 20 TABLET, DELAYED RELEASE ORAL at 12:02

## 2018-11-27 RX ADMIN — Medication 4: at 23:06

## 2018-11-27 RX ADMIN — PIPERACILLIN AND TAZOBACTAM 25 GRAM(S): 4; .5 INJECTION, POWDER, LYOPHILIZED, FOR SOLUTION INTRAVENOUS at 13:25

## 2018-11-27 NOTE — PROGRESS NOTE ADULT - ASSESSMENT
78M with PMHx as above, admitted with acute hypoxemic respiratory failure, severe sepsis, RLL PNA, AMEYA, Coagulapathy, transaminitis    -Precedex gtt for sedation and vent synchrony  -Pain management  -Monitor HD's. On midodrine for BP support  -Consider Holding BB given soft BP  -Lung protective ventilation. Wean FiO2 as tolerated by O2 saturation > 92%  -Check abg  -Chest PT/nebs  -NPO. TF's. PPI  -Renal function improving. Avoid nephrotoxic agents. Monitor  -Continue IVF's  -No active bleed. Monitor PT/INR  -Coagulapathy likely due to acute liver failure due to severe sepsis  -Trend LFT's  -Continue Abx. Monitor wbc/temp  -F/U cultures  -DVT ppx  -Supportive care

## 2018-11-27 NOTE — PROGRESS NOTE ADULT - SUBJECTIVE AND OBJECTIVE BOX
PULMONARY/CRITICAL CARE      INTERVAL HPI/OVERNIGHT EVENTS:  Had tachypnea earlier, given versed. Sedated now. Bp borderline. No fever.  78y MaleHPI:  This is an 77 y/o M with PMH of DM, Dyslipidemia, CAD s/p CABG, PPM, Parkinson's Disease with Dementia, Hypothyroidism and BPH who presented with respiratory distress. As per patient's wife at the bedside he was at his baseline condition till 4 days ago when she noticed that he closes his mouth & doesn't allow any thing to be put in his mouth, and she has to use a dripper to feed him. Yesterday she noticed that he is not responding, and today he was breathing fast with audible wheezing, so she brought him to the hospital, no cough, fever, or chills at home, also denies vomiting, no BM over the past 4 days. Wife stated also that patient "lost his voice" a month ago because of his parkinson's disease, but was able to understand and interact non verbally, till yesterday. (25 Nov 2018 23:07)        PAST MEDICAL & SURGICAL HISTORY:  Dyslipidemia  Hypothyroidism  Parkinson's disease  Diabetes mellitus  CAD (coronary artery disease) of artery bypass graft  Benign essential hypertension  S/P CABG (coronary artery bypass graft)  Pacemaker        ICU Vital Signs Last 24 Hrs  T(C): 37.4 (27 Nov 2018 04:08), Max: 38.4 (27 Nov 2018 00:08)  T(F): 99.4 (27 Nov 2018 04:08), Max: 101.2 (27 Nov 2018 00:08)  HR: 88 (27 Nov 2018 06:00) (62 - 92)  BP: 94/60 (27 Nov 2018 06:00) (84/55 - 128/97)  BP(mean): 70 (27 Nov 2018 06:00) (64 - 107)  ABP: --  ABP(mean): --  RR: 31 (27 Nov 2018 06:00) (24 - 38)  SpO2: 100% (27 Nov 2018 06:00) (92% - 100%)    Qtts:     I&O's Summary    25 Nov 2018 07:01  -  26 Nov 2018 07:00  --------------------------------------------------------  IN: 485.8 mL / OUT: 0 mL / NET: 485.8 mL    26 Nov 2018 07:01  -  27 Nov 2018 06:46  --------------------------------------------------------  IN: 5123 mL / OUT: 1200 mL / NET: 3923 mL        Mode: AC/ CMV (Assist Control/ Continuous Mandatory Ventilation)  RR (machine): 14  TV (machine): 400  FiO2: 40  PEEP: 5  ITime: 1  MAP: 8  PIP: 22      REVIEW OF SYSTEMS:    CONSTITUTIONAL: No fever, weight loss, or fatigue  EYES: No eye pain, visual disturbances, or discharge  RESPIRATORY: No cough, wheezing, chills or hemoptysis; had  shortness of breath  CARDIOVASCULAR: No chest pain, palpitations, dizziness, or leg swelling  GASTROINTESTINAL: No abdominal or epigastric pain. No nausea, vomiting, or hematemesis; No diarrhea or constipation. No melena or hematochezia.  GENITOURINARY: No dysuria, frequency, hematuria, or incontinence  NEUROLOGICAL: No headaches, memory loss, loss of strength, numbness, or tremors  SKIN: No itching, burning, rashes, or lesions   LYMPH NODES: No enlarged glands      PHYSICAL EXAM:    GENERAL: thin elderly male, somewhat agitated on vent  HEAD:  Atraumatic, Normocephalic  EYES: EOMI, PERRLA, conjunctiva and sclera clear  ENMT: No tonsillar erythema, exudates, or enlargement; Moist mucous membranes, Good dentition, No lesions  NECK: Supple, No JVD, Normal thyroid  NERVOUS SYSTEM:  sedated, moves extremities  CHEST/LUNG: orally intubated, few rhonchi bilat , good excursion.  HEART: Regular rate and rhythm; No murmurs, rubs, or gallops  ABDOMEN: Soft, Nontender, Nondistended; Bowel sounds present  EXTREMITIES:  2+ Peripheral Pulses, No clubbing, cyanosis, or edema  LYMPH: No lymphadenopathy noted  SKIN: No rashes or lesions        LABS:                        14.1   4.66  )-----------( 260      ( 26 Nov 2018 06:52 )             42.9     11-26    136  |  101  |  29<H>  ----------------------------<  234<H>  3.6   |  20<L>  |  1.32<H>    Ca    7.9<L>      26 Nov 2018 21:49  Phos  2.9     11-26  Mg     2.1     11-26    TPro  6.6  /  Alb  2.5<L>  /  TBili  2.1<H>  /  DBili  x   /  AST  238<H>  /  ALT  79<H>  /  AlkPhos  57  11-26    PT/INR - ( 26 Nov 2018 06:52 )   PT: 18.4 sec;   INR: 1.66 ratio         PTT - ( 25 Nov 2018 20:27 )  PTT:31.3 sec    ABG - ( 26 Nov 2018 11:31 )  pH, Arterial: x     pH, Blood: 7.45  /  pCO2: 24    /  pO2: 121   / HCO3: 20    / Base Excess: -6.4  /  SaO2: 99                vanco through     RADIOLOGY & ADDITIONAL STUDIES:  CXR rll infil    CRITICAL CARE TIME SPENT:

## 2018-11-27 NOTE — CONSULT NOTE ADULT - PROBLEM SELECTOR RECOMMENDATION 2
S/p remote CABG; continue medical management.
SP tube placed.    Will follow up.
antibiotics
-30 cc/kg fluid challenge finished and currently holding improvement in blood pressure  - will have low threshold for Levophed given current volume status, will titrate for MAP 65-70  -repeat lactate  -blood/urine/sputum cultures, then initiate broad spectrum antibiotics  -follow cultures and narrow antibitoics as able  -goal UOP >0.5 cc/kg/hr  -obtain procalcitonin

## 2018-11-27 NOTE — PROGRESS NOTE ADULT - SUBJECTIVE AND OBJECTIVE BOX
78M with PMHc of DM, Dyslipidemia, CAD s/p CABG, PPM, Parkinson's Disease with Dementia, Hypothyroidism and BPH who presented with respiratory distress. Pt admitted with acute hypoxemic respiratory failure due to PNA, severe sepsis, johnny. Pt's hospital course significant for worsening respiratory status requiring intubation.     24 hour events: Pt seen and examined at bedside. Chart/labs reviewed. Pt remains on vent support. Pt remains on precedex gtt    PAST MEDICAL & SURGICAL HISTORY:  Dyslipidemia  Hypothyroidism  Parkinson's disease  Diabetes mellitus  CAD (coronary artery disease) of artery bypass graft  Benign essential hypertension  S/P CABG (coronary artery bypass graft)  Pacemaker      Review of Systems:  unable to obtain    T(F): 101.2 (11-27-18 @ 00:08), Max: 101.2 (11-27-18 @ 00:08)  HR: 66 (11-26-18 @ 20:36) (62 - 105)  BP: 98/61 (11-26-18 @ 18:00) (84/55 - 212/188)  RR: 24 (11-26-18 @ 18:00) (24 - 40)  SpO2: 97% (11-26-18 @ 20:36) (61% - 100%)  Wt(kg): --    Mode: AC/ CMV (Assist Control/ Continuous Mandatory Ventilation), RR (machine): 14, TV (machine): 400, FiO2: 40, PEEP: 5    CAPILLARY BLOOD GLUCOSE      POCT Blood Glucose.: 145 mg/dL (27 Nov 2018 00:13)      I&O's Summary    25 Nov 2018 07:01  -  26 Nov 2018 07:00  --------------------------------------------------------  IN: 485.8 mL / OUT: 0 mL / NET: 485.8 mL    26 Nov 2018 07:01  -  27 Nov 2018 01:34  --------------------------------------------------------  IN: 2838 mL / OUT: 900 mL / NET: 1938 mL        Physical Exam:     Gen: sedated/intubated  Neuro: sedated and intubated  HEENT: NC/AT  Resp: Coarse BS bilaterally  CVS: nl S1/S2, RRR  Abd: soft, nt, nd, +bs  Ext: no edema, +pulses  Skin: well perfused, warm      Meds:    azithromycin  IVPB IV Intermittent  piperacillin/tazobactam IVPB. IV Intermittent  metoprolol tartrate Injectable IV Push  midodrine Oral  atorvastatin Oral  insulin lispro (HumaLOG) corrective regimen sliding scale SubCutaneous  levothyroxine Injectable IV Push  acetaminophen  Suppository .. Rectal PRN  aspirin Suppository Rectal  carbidopa/levodopa CR 25/100 Oral  dexmedetomidine Infusion IV Continuous  clopidogrel Tablet Oral  pantoprazole  Injectable IV Push  albumin human 25% IVPB IV Intermittent  dextrose 5%. IV Continuous  sodium chloride 0.45%. IV Continuous                                  14.1   4.66  )-----------( 260      ( 26 Nov 2018 06:52 )             42.9       11-26    136  |  101  |  29<H>  ----------------------------<  234<H>  3.6   |  20<L>  |  1.32<H>    Ca    7.9<L>      26 Nov 2018 21:49  Phos  2.9     11-26  Mg     2.1     11-26    TPro  6.6  /  Alb  2.5<L>  /  TBili  2.1<H>  /  DBili  x   /  AST  238<H>  /  ALT  79<H>  /  AlkPhos  57  11-26    Lactate 4.9           11-26 @ 21:49    Lactate 4.9           11-26 @ 17:33    Lactate 5.8           11-26 @ 08:08    Lactate 4.3           11-26 @ 03:31      CARDIAC MARKERS ( 26 Nov 2018 17:33 )  .122 ng/mL / x     / x     / x     / x      CARDIAC MARKERS ( 26 Nov 2018 10:00 )  .179 ng/mL / x     / x     / x     / x          PT/INR - ( 26 Nov 2018 06:52 )   PT: 18.4 sec;   INR: 1.66 ratio         PTT - ( 25 Nov 2018 20:27 )  PTT:31.3 sec    .Sputum Sputum --   Moderate polymorphonuclear leukocytes per low power field  Rare Squamous epithelial cells per low power field  Moderate Gram positive cocci in pairs per oil power field  Few Gram Negative Rods per oil power field 11-26 @ 17:15      Rapid RVP Result: NotDetec (11-25 @ 20:27)    CXR:  Cardiac monitor leads overlie the chest. Cardiac device overlies left upper   thorax. NG tube present with tip below bottom of image in stomach, left   upper quadrant. Endotracheal tube present, tip 5 cm above the kourtney.     Airspace disease right lung base compatible with pneumonia in the proper   clinical setting not significantly changed since the prior exam. The left   lung is normally expanded. Heart size within normal limits. No acute osseous   abnormalities.     IMPRESSION:     See above report     TTE:  Completed. Pending official report      CENTRAL LINE: no    LORENZANA: Yes    A-LINE: no    GLOBAL ISSUE/BEST PRACTICE:  Analgesia: yes  Sedation: yes  HOB elevation: yes  Stress ulcer prophylaxis: yes  VTE prophylaxis: yes  Glycemic control: yes  Nutrition: yes      CODE STATUS: Full  GOC discussion: Y  Critical care time spent (mins): 49  (Reviewing data, imaging, discussing with multidisciplinary team, non inclusive of procedures, discussing goals of care with patient/family)

## 2018-11-27 NOTE — PROGRESS NOTE ADULT - ASSESSMENT
77 y/o M with PMH of DM, Dyslipidemia, CAD s/p CABG, PPM, Parkinson's Disease with Dementia, Hypothyroidism and BPH presented with respiratory distress.     1. Acute respiratory failure/Sepsis/PNA  -S/P intubation  Possibly secondary to CAP VS.  Aspiration PNA  -Continue with  current antibiotic as per ID  -RSV panel negative.  Blood culture pending  -Further care as per pulmonary    2.  Acute renal failure With urinary retention.  Possibly due to ATN VS. Dehydration in the setting of infection.  -Suprapubic cath  was inserted as per urology  -Continue with IVF  -Avoid nephrotoxin  -nephrology to follow up      3. Coagulopathy. ML related to liver cell failure given the LFTs and current sepsis.    -Monitor LFT/PT/INR    4.  DM2  -Continue with ISS, and Monitor POC  -Hold oral medications    5. HX of CAD.  Continue with ASA, and Metoprolol    6.  Abnormal trop level  -Seems to be secondary to demand mismatch ischemia in the setting of sepsis, and infection  -Obtain serial trop, TTE, and cardiology consult    7.  Parkinson's disease.  Will resume home medications  -Patient has been on puree diet  -Son/Dr. Grider refuses Peg tube placement  -Considering hospice if survives hospitalization    DVT proph heparin    prognosis guarded to poor    8. Hypokalemia/ hypophosphatemia-   replte K and christen.

## 2018-11-27 NOTE — GOALS OF CARE CONVERSATION - PERSONAL ADVANCE DIRECTIVE - CONVERSATION DETAILS
Pt does not have a HCP, wife is surrogate. Discussed resuscitation. She is waiting for her son from [place] to arrive. She will discuss It with her family at that time. Will follow.

## 2018-11-27 NOTE — CONSULT NOTE ADULT - SUBJECTIVE AND OBJECTIVE BOX
CHIEF COMPLAINT: Patient is a 78y old  Male who presents with a chief complaint of Respiratory distress. (27 Nov 2018 07:53)    HPI:  77 y/o man with a history of DM, Dyslipidemia, CAD s/p CABG, PPM, Parkinson's Disease with Dementia, Hypothyroidism and BPH admitted on 11/25 after presenting to the ER with altered mental status and respiratory distress.  Patient is unable to provide any history; all history obtained from chart review.  At the time of admission his wife described that Mr. Grider had been nonverbal for  ~ 1 week associated with fatigue, decreased oral intake.  In the ER he was febrile to 106 degrees and diagnosed with acute hypoxic respiratory failure associated, sepsis, pneumonia / possible aspiration or early ARDS.  Cardiology consult requested for elevated serum troponin.     PAST MEDICAL & SURGICAL HISTORY:  Dyslipidemia  Hypothyroidism  Parkinson's disease  Diabetes mellitus  CAD (coronary artery disease) of artery bypass graft  Benign essential hypertension  S/P CABG (coronary artery bypass graft)  Pacemaker    SOCIAL HISTORY:  Unable to obtain secondary to patient condition.    FAMILY HISTORY: Unable to obtain secondary to patient condition.    MEDICATIONS  (STANDING):  albumin human 25% IVPB 50 milliLiter(s) IV Intermittent every 8 hours  aspirin Suppository 300 milliGRAM(s) Rectal daily  atorvastatin 40 milliGRAM(s) Oral at bedtime  azithromycin  IVPB 500 milliGRAM(s) IV Intermittent every 24 hours  carbidopa/levodopa CR 25/100 1.5 Tablet(s) Oral two times a day  chlorhexidine 0.12% Liquid 15 milliLiter(s) Swish and Spit two times a day  clopidogrel Tablet 75 milliGRAM(s) Oral daily  dexmedetomidine Infusion 0.3 MICROgram(s)/kG/Hr (4.522 mL/Hr) IV Continuous <Continuous>  insulin lispro (HumaLOG) corrective regimen sliding scale   SubCutaneous every 6 hours  levothyroxine Injectable 62.5 MICROGram(s) IV Push at bedtime  metoprolol tartrate Injectable 5 milliGRAM(s) IV Push every 6 hours  midodrine 5 milliGRAM(s) Oral three times a day  pantoprazole  Injectable 40 milliGRAM(s) IV Push daily  piperacillin/tazobactam IVPB. 3.375 Gram(s) IV Intermittent every 8 hours  sodium chloride 0.45%. 1000 milliLiter(s) (80 mL/Hr) IV Continuous <Continuous>    MEDICATIONS  (PRN):  acetaminophen  Suppository .. 650 milliGRAM(s) Rectal every 6 hours PRN Temp greater or equal to 38.5C (101.3F)  dextrose 40% Gel 15 Gram(s) Oral once PRN Blood Glucose LESS THAN 70 milliGRAM(s)/deciliter  glucagon  Injectable 1 milliGRAM(s) IntraMuscular once PRN Glucose LESS THAN 70 milligrams/deciliter  midazolam Injectable 1 milliGRAM(s) IV Push every 2 hours PRN sedation    Allergies:  No Known Allergies    REVIEW OF SYSTEMS:  Unable to obtain secondary to patient condition.    VITAL SIGNS:   T(C): 37.4 (27 Nov 2018 04:08), Max: 38.4 (27 Nov 2018 00:08)  T(F): 99.4 (27 Nov 2018 04:08), Max: 101.2 (27 Nov 2018 00:08)  HR: 84 (27 Nov 2018 07:27) (62 - 92)  BP: 94/60 (27 Nov 2018 06:00) (84/55 - 128/97)  BP(mean): 70 (27 Nov 2018 06:00) (64 - 107)  RR: 31 (27 Nov 2018 06:00) (24 - 38)  SpO2: 99% (27 Nov 2018 07:27) (92% - 100%)    PHYSICAL EXAM:  Constitutional: Ill-appearing, lethargic on vent  HEENT:  No oral cyanosis. ETT to vent.  NGT in R nostril.  Pulmonary: Scattered rhonchi mechanically-delivered ventilations  Cardiovascular: Regular, normal S1 and S2  Gastrointestinal: Bowel Sounds present, soft, nontender, PEG  Lymph: No pedal edema. No cervical lymphadenopathy.  Neurological: Lethargic, not following commands    LABS:                    14.1   4.66  )-----------( 260      ( 26 Nov 2018 06:52 )             42.9              135    |  99     |  25     ----------------------------<  173    3.1     |  22     |  1.13     CARDIAC MARKERS ( 27 Nov 2018 06:54 ) .105 ng/mL / x     / x     / x     / x      CARDIAC MARKERS ( 26 Nov 2018 17:33 ) .122 ng/mL / x     / x     / x     / x      CARDIAC MARKERS ( 26 Nov 2018 10:00 ) .179 ng/mL / x     / x     / x     / x        Pro Bnp [id number]    Transthoracic Echocardiogram w/Doppler Complete (11.26.18 @ 14:02):  1. Endocardium not well visualized, especially in the apical windows. Grossly normal left ventricular size with mild systolic dysfunction.  LVEF estimated at 48%. Mild diastolic dysfunction(stage I).  2. Moderate mitral regurgitation.  3. Aortic valve sclerosis. Mild to moderate aortic insufficiency.  4. Mild to moderate tricuspid regurgitation.     Tele: Sinus rhythm

## 2018-11-27 NOTE — CONSULT NOTE ADULT - PROBLEM SELECTOR RECOMMENDATION 9
Minimal elevation of serum troponin in the setting of high fever, renal insufficiency, and critical illness likely represents demand injury in the setting of known CAD.

## 2018-11-27 NOTE — PROGRESS NOTE ADULT - ASSESSMENT
77 y/o M with PMH of DM, Dyslipidemia, CAD s/p CABG, PPM, Parkinson's Disease with Dementia, Hypothyroidism and BPH presented with respiratory distress.   Acute respiratory failure due to pneumonia, ?early ARDS. Still sob on vent, requiring versed.     Sepsis due to pneumonia with high lactate.  Parkinsonism with dementia  DM with hi glu

## 2018-11-27 NOTE — PROGRESS NOTE ADULT - SUBJECTIVE AND OBJECTIVE BOX
ID Progress note     Name: SHON HAMEED  Age: 78y  Gender: Male  MRN: [id number]    Interval History-- Events noted, remains sedated and intubated , NGT placed and started on NGT feeds , scanty endotracheal secretions , UOP only 300 cc . Remains off pressors   Notes reviewed    Past Medical History--  Dyslipidemia  Hypothyroidism  Parkinson's disease  Diabetes mellitus  CAD (coronary artery disease) of artery bypass graft  Benign essential hypertension  Adult hypothyroidism  Acquired hypothyroidism  S/P CABG (coronary artery bypass graft)  Pacemaker      For details regarding the patient's social history, family history, and other miscellaneous elements, please refer the initial infectious diseases consultation and/or the admitting history and physical examination for this admission.    Allergies--  Allergies    No Known Allergies    Intolerances        Medications--  Antibiotics:  azithromycin  IVPB 500 milliGRAM(s) IV Intermittent every 24 hours  piperacillin/tazobactam IVPB. 3.375 Gram(s) IV Intermittent every 8 hours    Immunologic:    Other:  acetaminophen  Suppository .. PRN  albumin human 25% IVPB  aspirin Suppository  atorvastatin  carbidopa/levodopa CR 25/100  chlorhexidine 0.12% Liquid  clopidogrel Tablet  dexmedetomidine Infusion  dextrose 40% Gel PRN  dextrose 5%.  dextrose 50% Injectable  dextrose 50% Injectable  dextrose 50% Injectable  glucagon  Injectable PRN  insulin lispro (HumaLOG) corrective regimen sliding scale  levothyroxine Injectable  metoprolol tartrate Injectable  midazolam Injectable PRN  midodrine  pantoprazole  Injectable  sodium chloride 0.45%.      Review of Systems--  Review of systems unable to be obtained secondary to clinical condition.    Physical Examination--    Vital Signs: T(F): 99.4 (11-27-18 @ 04:08), Max: 101.2 (11-27-18 @ 00:08)  HR: 84 (11-27-18 @ 07:27)  BP: 94/60 (11-27-18 @ 06:00)  RR: 31 (11-27-18 @ 06:00)  SpO2: 99% (11-27-18 @ 07:27)  Wt(kg): --  General: cachectic frail appearing Male intubated, sedated   HEENT: AT/NC. PERRL. EOMI. Anicteric. Conjunctiva pink and moist. Oropharynx clear. Dentition fair.  Neck: Not rigid. No sense of mass.  Nodes: None palpable.  Lungs: Coarse rhonchi  bilaterally without rales, wheezing or rhonchi  Heart: Regular rate and rhythm. No Murmur. No rub. No gallop. No palpable thrill.  Abdomen: Bowel sounds present and normoactive. Soft. Nondistended. Nontender. No sense of mass. No organomegaly.  Back: No spinal tenderness. No costovertebral angle tenderness.   Extremities: No cyanosis or clubbing. No edema.   Skin: Warm. Dry. Good turgor. No rash. No vasculitic stigmata.  Psychiatric: Appropriate affect and mood for situation.         Laboratory Studies--  CBC                        14.1   4.66  )-----------( 260      ( 26 Nov 2018 06:52 )             42.9       Chemistries  11-27    135  |  99  |  25<H>  ----------------------------<  173<H>  3.1<L>   |  22  |  1.13    Ca    7.7<L>      27 Nov 2018 06:54  Phos  2.1     11-27  Mg     1.8     11-27    TPro  6.4  /  Alb  2.8<L>  /  TBili  1.2  /  DBili  x   /  AST  355<H>  /  ALT  44  /  AlkPhos  79  11-27    Lactate, Blood: 3.1 mmol/L (11-27-18 @ 06:54)  Lactate, Blood: 4.9 mmol/L (11-26-18 @ 21:49)  Lactate, Blood: 4.9 mmol/L (11-26-18 @ 17:33)  Lactate, Blood: 5.8 mmol/L (11-26-18 @ 08:08)  Lactate, Blood: 4.3 mmol/L (11-26-18 @ 03:31)  Lactate, Blood: 5.0 mmol/L (11-25-18 @ 21:33)      Procalcitonin, Serum (04.20.14 @ 15:00)    Procalcitonin, Serum: <0.05:    CARDIAC MARKERS ( 27 Nov 2018 06:54 )  .105 ng/mL / x     / x     / x     / x      CARDIAC MARKERS ( 26 Nov 2018 17:33 )  .122 ng/mL / x     / x     / x     / x      CARDIAC MARKERS ( 26 Nov 2018 10:00 )  .179 ng/mL / x     / x     / x     / x          CAPILLARY BLOOD GLUCOSE      POCT Blood Glucose.: 82 mg/dL (27 Nov 2018 05:28)  POCT Blood Glucose.: 145 mg/dL (27 Nov 2018 00:13)  POCT Blood Glucose.: 128 mg/dL (26 Nov 2018 17:19)  POCT Blood Glucose.: 172 mg/dL (26 Nov 2018 12:23)    Blood Gas Profile - Arterial (11.26.18 @ 11:31)    Blood Gas Source: Arterial    Blood Gas Comments: Peep +5    pH, Blood: 7.45    pCO2, Arterial: 24 mmHg    pO2, Arterial: 121 mmHg    HCO3, Arterial: 20 mmoL/L    Base Excess, Arterial: -6.4 mmol/L    Oxygen Saturation, Arterial: 99 %    FIO2, Arterial: 50    Hemoglobin A1C, Whole Blood (11.26.18 @ 15:02)    Hemoglobin A1C, Whole Blood: 8.5:     Recent Cultures:    Culture - Sputum (collected 26 Nov 2018 17:15)  Source: .Sputum Sputum  Gram Stain (26 Nov 2018 21:35):    Moderate polymorphonuclear leukocytes per low power field    Rare Squamous epithelial cells per low power field    Moderate Gram positive cocci in pairs per oil power field    Few Gram Negative Rods per oil power field    Culture - Blood (collected 26 Nov 2018 01:50)  Source: .Blood Blood  Preliminary Report (27 Nov 2018 02:02):    No growth to date.    Culture - Blood (collected 26 Nov 2018 01:50)  Source: .Blood Blood  Preliminary Report (27 Nov 2018 02:02):    No growth to date.      Rapid Respiratory Viral Panel (11.25.18 @ 20:27)    Rapid RVP Result: NotDetec:    Radiology:  US Transthoracic Echocardiogram w/Doppler Complete (11.26.18 @ 14:02) >  MEASUREMENTS  IVS: 0.8 cm  PWT: 0.9 cm  LA: 2.7 cm  Aortic Root: 3.1 cm  LVIDd: 4.2 cm  LVIDs: 3.2 cm    LVEF: 48%    FINDINGS  Left Ventricle: Endocardium not well visualized, especially in the apical   windows. Grossly normal left ventricular size with mild systolic   dysfunction.  LVEF estimated at 48%. Mild diastolic dysfunction (stage I).  Right Ventricle: Grossly normal right ventricular size and function.  Left Atrium: Normal left atrium.  Right Atrium: The right atrium appears enlarged.  Mitral Valve: Mildly thickened mitral leaflets. Moderate mitral   regurgitation.  Aortic Valve: Aortic valve sclerosis. Mild to moderate aortic   insufficiency.  Tricuspid Valve: Normal tricuspid valve. Mild to moderate tricuspid   regurgitation. Pulmonary artery systolic pressure estimated at 27 mmHg,   assuming a right atrial pressure of 3 mmHg, which is normal.  Pulmonic Valve: Normal pulmonic valve.  Pericardium/Pleura: No pericardial effusion.      CONCLUSIONS:  1. Endocardium not well visualized, especially in the apical windows.   Grossly normal left ventricular size with mild systolic dysfunction.    LVEF estimated at 48%. Mild diastolic dysfunction(stage I).  2. Moderate mitral regurgitation.  3. Aortic valve sclerosis. Mild to moderate aortic insufficiency.  4. Mild to moderate tricuspid regurgitation.     Xray Chest 1 View- PORTABLE-Urgent (11.26.18 @ 03:25) >  Cardiac monitor leads overlie the chest. Cardiac device overlies left   upper thorax. NG tube present with tip below bottom of image in stomach,   left upper quadrant. Endotracheal tube present, tip 5 cm above the   kourtney.     Airspace disease right lung base compatible with pneumonia in the proper   clinical setting not significantly changed since the prior exam. The left   lung is normally expanded. Heart size within normal limits. No acute   osseous abnormalities.        Assessment--    77 y/o M with PMH of DM, Dyslipidemia, CAD s/p CABG, PPM, Parkinson's Disease with Dementia, Hypothyroidism and BPH who presented with respiratory distress.  Has acute hypoxic respiratory failure secondary to  pneumonia likely aspiration as he has parkinson's diseases. Could have severe CAP too He was febrile and hypoxic , likely has severe sepsis with septic shock. He has been not on any pressors . Based on his presentation he will likely have positive blood cs   He likely has Gram negative pneumonia secondary to aspiration . Doubt its legionella.    he has poorly controllled DM    Plan:  - continue with IV zosyn and Zithromax , if urine legionella antigen is negative will dc Zithromax   - screen for MRSA  - serial CXR  - trend lactate   - overall prognosis poor , family may decide for comfort measures       I have discussed the above plan of care with patient/family in detail. They expressed understanding of the treatment plan . Risks, benefits and alternatives discussed in detail. I have asked if they have any questions or concerns and appropriately addressed them to the best of my ability . We need to have Goals of care conversation with family . I  have left message for his Son Dr. Jalen Hameed       > 35 minutes spent in direct patient care reviewing  the notes, lab data/ imaging , discussion with multidisciplinary team. All questions were addressed and answered to the best of my capacity .    Thank you for allowing me to participate in the care of your patient .        Stu Cage MD  [phone]

## 2018-11-27 NOTE — PROGRESS NOTE ADULT - SUBJECTIVE AND OBJECTIVE BOX
Patient is a 78y old  Male who presents with a chief complaint of Respiratory distress. (27 Nov 2018 10:37)      INTERVAL HPI/OVERNIGHT EVENTS:  unable to obtain due to underlying medical condition. remains intubated.    Pain Location & Control:     MEDICATIONS  (STANDING):  albumin human 25% IVPB 50 milliLiter(s) IV Intermittent every 8 hours  aspirin Suppository 300 milliGRAM(s) Rectal daily  atorvastatin 40 milliGRAM(s) Oral at bedtime  azithromycin  IVPB 500 milliGRAM(s) IV Intermittent every 24 hours  carbidopa/levodopa CR 25/100 1.5 Tablet(s) Oral two times a day  chlorhexidine 0.12% Liquid 15 milliLiter(s) Swish and Spit two times a day  clopidogrel Tablet 75 milliGRAM(s) Oral daily  dexmedetomidine Infusion 0.3 MICROgram(s)/kG/Hr (4.522 mL/Hr) IV Continuous <Continuous>  dextrose 5%. 1000 milliLiter(s) (50 mL/Hr) IV Continuous <Continuous>  dextrose 50% Injectable 12.5 Gram(s) IV Push once  dextrose 50% Injectable 25 Gram(s) IV Push once  dextrose 50% Injectable 25 Gram(s) IV Push once  insulin lispro (HumaLOG) corrective regimen sliding scale   SubCutaneous every 6 hours  levothyroxine Injectable 62.5 MICROGram(s) IV Push at bedtime  midodrine 5 milliGRAM(s) Oral three times a day  pantoprazole  Injectable 40 milliGRAM(s) IV Push daily  piperacillin/tazobactam IVPB. 3.375 Gram(s) IV Intermittent every 8 hours  potassium chloride  10 mEq/100 mL IVPB 10 milliEquivalent(s) IV Intermittent every 1 hour  potassium phosphate / sodium phosphate powder 1 Packet(s) Oral two times a day  sodium chloride 0.9%. 1000 milliLiter(s) (75 mL/Hr) IV Continuous <Continuous>    MEDICATIONS  (PRN):  acetaminophen  Suppository .. 650 milliGRAM(s) Rectal every 6 hours PRN Temp greater or equal to 38.5C (101.3F)  dextrose 40% Gel 15 Gram(s) Oral once PRN Blood Glucose LESS THAN 70 milliGRAM(s)/deciliter  glucagon  Injectable 1 milliGRAM(s) IntraMuscular once PRN Glucose LESS THAN 70 milligrams/deciliter  midazolam Injectable 1 milliGRAM(s) IV Push every 2 hours PRN sedation      Allergies    No Known Allergies    Intolerances          Vital Signs Last 24 Hrs  T(C): 37.3 (27 Nov 2018 08:15), Max: 38.4 (27 Nov 2018 00:08)  T(F): 99.1 (27 Nov 2018 08:15), Max: 101.2 (27 Nov 2018 00:08)  HR: 89 (27 Nov 2018 08:00) (62 - 92)  BP: 113/59 (27 Nov 2018 08:00) (84/55 - 128/97)  BP(mean): 75 (27 Nov 2018 08:00) (64 - 107)  RR: 35 (27 Nov 2018 08:00) (24 - 38)  SpO2: 100% (27 Nov 2018 08:00) (92% - 100%)        I&O's Detail    26 Nov 2018 07:01  -  27 Nov 2018 07:00  --------------------------------------------------------  IN:    Albumin 25%: 150 mL    dexmedetomidine Infusion: 33 mL    Enteral Tube Flush: 400 mL    Free Water: 30 mL    Glucerna: 510 mL    IV PiggyBack: 450 mL    Lactated Ringers IV Bolus: 1000 mL    sodium chloride 0.45%: 1600 mL    sodium chloride 0.9%: 150 mL    Sodium Chloride 0.9% IV Bolus: 500 mL    Solution: 300 mL  Total IN: 5123 mL    OUT:    Indwelling Catheter - Suprapubic: 1200 mL  Total OUT: 1200 mL    Total NET: 3923 mL      27 Nov 2018 07:01  -  27 Nov 2018 11:05  --------------------------------------------------------  IN:    Enteral Tube Flush: 50 mL    Glucerna: 80 mL  Total IN: 130 mL    OUT:  Total OUT: 0 mL    Total NET: 130 mL          LABS:                        11.2   13.60 )-----------( 223      ( 27 Nov 2018 06:54 )             34.1     27 Nov 2018 06:54    135    |  99     |  25     ----------------------------<  173    3.1     |  22     |  1.13     Ca    7.7        27 Nov 2018 06:54  Phos  2.1       27 Nov 2018 06:54  Mg     1.8       27 Nov 2018 06:54    TPro  6.4    /  Alb  2.8    /  TBili  1.2    /  DBili  x      /  AST  355    /  ALT  44     /  AlkPhos  79     27 Nov 2018 06:54    PT/INR - ( 26 Nov 2018 06:52 )   PT: 18.4 sec;   INR: 1.66 ratio         PTT - ( 25 Nov 2018 20:27 )  PTT:31.3 sec    CAPILLARY BLOOD GLUCOSE      POCT Blood Glucose.: 82 mg/dL (27 Nov 2018 05:28)  POCT Blood Glucose.: 145 mg/dL (27 Nov 2018 00:13)  POCT Blood Glucose.: 128 mg/dL (26 Nov 2018 17:19)  POCT Blood Glucose.: 172 mg/dL (26 Nov 2018 12:23)    CARDIAC MARKERS ( 27 Nov 2018 06:54 )  .105 ng/mL / x     / x     / x     / x      CARDIAC MARKERS ( 26 Nov 2018 17:33 )  .122 ng/mL / x     / x     / x     / x      CARDIAC MARKERS ( 26 Nov 2018 10:00 )  .179 ng/mL / x     / x     / x     / x          Cultures  Culture Results:   No growth to date. (11-26 @ 01:50)  Culture Results:   No growth to date. (11-26 @ 01:50)    Lactate, Blood: 3.1 mmol/L (11-27 @ 06:54)  Lactate, Blood: 4.9 mmol/L (11-26 @ 21:49)  Lactate, Blood: 4.9 mmol/L (11-26 @ 17:33)      Culture - Sputum (collected 11-26-18 @ 17:15)  Source: .Sputum Sputum  Gram Stain (11-26-18 @ 21:35):    Moderate polymorphonuclear leukocytes per low power field    Rare Squamous epithelial cells per low power field    Moderate Gram positive cocci in pairs per oil power field    Few Gram Negative Rods per oil power field    Culture - Blood (collected 11-26-18 @ 01:50)  Source: .Blood Blood  Preliminary Report (11-27-18 @ 02:02):    No growth to date.    Culture - Blood (collected 11-26-18 @ 01:50)  Source: .Blood Blood  Preliminary Report (11-27-18 @ 02:02):    No growth to date.        RADIOLOGY & ADDITIONAL TESTS:    Imaging Personally Reviewed:  [ ] YES  [ ] NO    Consultant(s) Notes Reviewed:  [ x] YES  [ ] NO    Care Discussed with Consultants/Other Providers [x ] YES  [ ] NO

## 2018-11-27 NOTE — CONSULT NOTE ADULT - PROBLEM SELECTOR PROBLEM 1
Elevated troponin
Sepsis, due to unspecified organism
PNA (pneumonia)
Acute respiratory failure with hypoxia

## 2018-11-27 NOTE — PROGRESS NOTE ADULT - ASSESSMENT
·	AMEYA: Prerenal azotemia, ATN  ·	Shock, Sepsis  ·	Respiratory failure on vent   ·	Hypokalemia  ·	Hyponatremia    Improved renal function. On IVF. Change to NS. To continue current meds. Potassium and phos supps.   IV abx. ID follow up. Avoid nephrotoxic meds as possible. Will follow electrolytes and renal function trend.   D/w family at bedside. ·	AMEYA: Prerenal azotemia, ATN  ·	Shock, Sepsis  ·	Respiratory failure on vent   ·	Hypokalemia  ·	Hyponatremia    Improved renal function. On IVF. Change to NS. To continue current meds. Potassium and phos supps.   Will d/c metoprolol for now. Can resume at lower dose if needed for BP control. IV abx. ID follow up.   Avoid nephrotoxic meds as possible. Will follow electrolytes and renal function trend. D/w family at bedside.

## 2018-11-27 NOTE — PROGRESS NOTE ADULT - SUBJECTIVE AND OBJECTIVE BOX
Patient is a 78y old  Male who presents with a chief complaint of Respiratory distress. (27 Nov 2018 08:03)      Patient seen in follow up for AMEYA. Remains on vent support.     PAST MEDICAL HISTORY:  Dyslipidemia  Hypothyroidism  Parkinson's disease  Diabetes mellitus  CAD (coronary artery disease) of artery bypass graft  Benign essential hypertension  Adult hypothyroidism  Acquired hypothyroidism    MEDICATIONS  (STANDING):  albumin human 25% IVPB 50 milliLiter(s) IV Intermittent every 8 hours  aspirin Suppository 300 milliGRAM(s) Rectal daily  atorvastatin 40 milliGRAM(s) Oral at bedtime  azithromycin  IVPB 500 milliGRAM(s) IV Intermittent every 24 hours  carbidopa/levodopa CR 25/100 1.5 Tablet(s) Oral two times a day  chlorhexidine 0.12% Liquid 15 milliLiter(s) Swish and Spit two times a day  clopidogrel Tablet 75 milliGRAM(s) Oral daily  dexmedetomidine Infusion 0.3 MICROgram(s)/kG/Hr (4.522 mL/Hr) IV Continuous <Continuous>  dextrose 5%. 1000 milliLiter(s) (50 mL/Hr) IV Continuous <Continuous>  dextrose 50% Injectable 12.5 Gram(s) IV Push once  dextrose 50% Injectable 25 Gram(s) IV Push once  dextrose 50% Injectable 25 Gram(s) IV Push once  insulin lispro (HumaLOG) corrective regimen sliding scale   SubCutaneous every 6 hours  levothyroxine Injectable 62.5 MICROGram(s) IV Push at bedtime  metoprolol tartrate Injectable 5 milliGRAM(s) IV Push every 6 hours  midodrine 5 milliGRAM(s) Oral three times a day  pantoprazole  Injectable 40 milliGRAM(s) IV Push daily  piperacillin/tazobactam IVPB. 3.375 Gram(s) IV Intermittent every 8 hours  potassium chloride  10 mEq/100 mL IVPB 10 milliEquivalent(s) IV Intermittent every 1 hour  potassium phosphate / sodium phosphate powder 1 Packet(s) Oral two times a day  sodium chloride 0.45%. 1000 milliLiter(s) (80 mL/Hr) IV Continuous <Continuous>    MEDICATIONS  (PRN):  acetaminophen  Suppository .. 650 milliGRAM(s) Rectal every 6 hours PRN Temp greater or equal to 38.5C (101.3F)  dextrose 40% Gel 15 Gram(s) Oral once PRN Blood Glucose LESS THAN 70 milliGRAM(s)/deciliter  glucagon  Injectable 1 milliGRAM(s) IntraMuscular once PRN Glucose LESS THAN 70 milligrams/deciliter  midazolam Injectable 1 milliGRAM(s) IV Push every 2 hours PRN sedation    T(C): 37.3 (11-27-18 @ 08:15), Max: 38.4 (11-26-18 @ 03:33)  HR: 89 (11-27-18 @ 08:00) (62 - 105)  BP: 113/59 (11-27-18 @ 08:00) (84/55 - 212/188)  RR: 35 (11-27-18 @ 08:00) (24 - 40)  SpO2: 100% (11-27-18 @ 08:00) (61% - 100%)  Wt(kg): --  I&O's Detail    26 Nov 2018 07:01  -  27 Nov 2018 07:00  --------------------------------------------------------  IN:    Albumin 25%: 150 mL    dexmedetomidine Infusion: 33 mL    Enteral Tube Flush: 400 mL    Free Water: 30 mL    Glucerna: 510 mL    IV PiggyBack: 450 mL    Lactated Ringers IV Bolus: 1000 mL    sodium chloride 0.45%.: 1600 mL    sodium chloride 0.9%: 150 mL    Sodium Chloride 0.9% IV Bolus: 500 mL    Solution: 300 mL  Total IN: 5123 mL    OUT:    Indwelling Catheter - Suprapubic: 1200 mL  Total OUT: 1200 mL    Total NET: 3923 mL      27 Nov 2018 07:01  -  27 Nov 2018 10:38  --------------------------------------------------------  IN:    Enteral Tube Flush: 50 mL    Glucerna: 80 mL  Total IN: 130 mL    OUT:  Total OUT: 0 mL    Total NET: 130 mL          PHYSICAL EXAM:  General: on vent  Respiratory: b/l air entry  Cardiovascular: S1 S2  Gastrointestinal: soft  Extremities:  no edema                          11.2   13.60 )-----------( 223      ( 27 Nov 2018 06:54 )             34.1     11-27    135  |  99  |  25<H>  ----------------------------<  173<H>  3.1<L>   |  22  |  1.13    Ca    7.7<L>      27 Nov 2018 06:54  Phos  2.1     11-27  Mg     1.8     11-27    TPro  6.4  /  Alb  2.8<L>  /  TBili  1.2  /  DBili  x   /  AST  355<H>  /  ALT  44  /  AlkPhos  79  11-27    CARDIAC MARKERS ( 27 Nov 2018 06:54 )  .105 ng/mL / x     / x     / x     / x      CARDIAC MARKERS ( 26 Nov 2018 17:33 )  .122 ng/mL / x     / x     / x     / x      CARDIAC MARKERS ( 26 Nov 2018 10:00 )  .179 ng/mL / x     / x     / x     / x          LIVER FUNCTIONS - ( 27 Nov 2018 06:54 )  Alb: 2.8 g/dL / Pro: 6.4 g/dL / ALK PHOS: 79 U/L / ALT: 44 U/L DA / AST: 355 U/L / GGT: x             ABG - ( 26 Nov 2018 11:31 )  pH, Arterial: x     pH, Blood: 7.45  /  pCO2: 24    /  pO2: 121   / HCO3: 20    / Base Excess: -6.4  /  SaO2: 99                Sodium, Serum: 135 (11-27 @ 06:54)  Sodium, Serum: 136 (11-26 @ 21:49)  Sodium, Serum: 137 (11-26 @ 17:54)  Sodium, Serum: 137 (11-26 @ 06:52)    Creatinine, Serum: 1.13 (11-27 @ 06:54)  Creatinine, Serum: 1.32 (11-26 @ 21:49)  Creatinine, Serum: 1.30 (11-26 @ 17:54)  Creatinine, Serum: 1.64 (11-26 @ 06:52)  Creatinine, Serum: 1.52 (11-25 @ 21:33)    Potassium, Serum: 3.1 (11-27 @ 06:54)  Potassium, Serum: 3.6 (11-26 @ 21:49)  Potassium, Serum: 5.8 (11-26 @ 17:54)  Potassium, Serum: 3.2 (11-26 @ 06:52)    Hemoglobin: 11.2 (11-27 @ 06:54)  Hemoglobin: 14.1 (11-26 @ 06:52)  Hemoglobin: 14.9 (11-25 @ 20:27)

## 2018-11-28 LAB
-  AMIKACIN: SIGNIFICANT CHANGE UP
-  AMOXICILLIN/CLAVULANIC ACID: SIGNIFICANT CHANGE UP
-  AMPICILLIN/SULBACTAM: SIGNIFICANT CHANGE UP
-  AMPICILLIN: SIGNIFICANT CHANGE UP
-  AZTREONAM: SIGNIFICANT CHANGE UP
-  CEFAZOLIN: SIGNIFICANT CHANGE UP
-  CEFEPIME: SIGNIFICANT CHANGE UP
-  CEFOXITIN: SIGNIFICANT CHANGE UP
-  CEFTRIAXONE: SIGNIFICANT CHANGE UP
-  CIPROFLOXACIN: SIGNIFICANT CHANGE UP
-  ERTAPENEM: SIGNIFICANT CHANGE UP
-  GENTAMICIN: SIGNIFICANT CHANGE UP
-  IMIPENEM: SIGNIFICANT CHANGE UP
-  LEVOFLOXACIN: SIGNIFICANT CHANGE UP
-  MEROPENEM: SIGNIFICANT CHANGE UP
-  PIPERACILLIN/TAZOBACTAM: SIGNIFICANT CHANGE UP
-  TOBRAMYCIN: SIGNIFICANT CHANGE UP
-  TRIMETHOPRIM/SULFAMETHOXAZOLE: SIGNIFICANT CHANGE UP
ALBUMIN SERPL ELPH-MCNC: 2.3 G/DL — LOW (ref 3.3–5)
ALP SERPL-CCNC: 152 U/L — HIGH (ref 30–120)
ALT FLD-CCNC: 30 U/L DA — SIGNIFICANT CHANGE UP (ref 10–60)
ANION GAP SERPL CALC-SCNC: 10 MMOL/L — SIGNIFICANT CHANGE UP (ref 5–17)
APPEARANCE UR: ABNORMAL
AST SERPL-CCNC: 251 U/L — HIGH (ref 10–40)
BACTERIA # UR AUTO: ABNORMAL
BILIRUB SERPL-MCNC: 1.3 MG/DL — HIGH (ref 0.2–1.2)
BILIRUB UR-MCNC: NEGATIVE — SIGNIFICANT CHANGE UP
BUN SERPL-MCNC: 23 MG/DL — SIGNIFICANT CHANGE UP (ref 7–23)
CALCIUM SERPL-MCNC: 7.7 MG/DL — LOW (ref 8.4–10.5)
CHLORIDE SERPL-SCNC: 100 MMOL/L — SIGNIFICANT CHANGE UP (ref 96–108)
CO2 SERPL-SCNC: 23 MMOL/L — SIGNIFICANT CHANGE UP (ref 22–31)
COLOR SPEC: ABNORMAL
CREAT SERPL-MCNC: 1.27 MG/DL — SIGNIFICANT CHANGE UP (ref 0.5–1.3)
DIFF PNL FLD: ABNORMAL
EPI CELLS # UR: SIGNIFICANT CHANGE UP
GLUCOSE SERPL-MCNC: 251 MG/DL — HIGH (ref 70–99)
GLUCOSE UR QL: 100 MG/DL
HCT VFR BLD CALC: 27.3 % — LOW (ref 39–50)
HGB BLD-MCNC: 9.1 G/DL — LOW (ref 13–17)
KETONES UR-MCNC: ABNORMAL
LACTATE SERPL-SCNC: 3 MMOL/L — HIGH (ref 0.7–2)
LEGIONELLA AG UR QL: NEGATIVE — SIGNIFICANT CHANGE UP
LEGIONELLA AG UR QL: NEGATIVE — SIGNIFICANT CHANGE UP
LEUKOCYTE ESTERASE UR-ACNC: ABNORMAL
MCHC RBC-ENTMCNC: 27.6 PG — SIGNIFICANT CHANGE UP (ref 27–34)
MCHC RBC-ENTMCNC: 33.3 GM/DL — SIGNIFICANT CHANGE UP (ref 32–36)
MCV RBC AUTO: 82.7 FL — SIGNIFICANT CHANGE UP (ref 80–100)
METHOD TYPE: SIGNIFICANT CHANGE UP
NITRITE UR-MCNC: NEGATIVE — SIGNIFICANT CHANGE UP
NRBC # BLD: 0 /100 WBCS — SIGNIFICANT CHANGE UP (ref 0–0)
PH UR: 7 — SIGNIFICANT CHANGE UP (ref 5–8)
PHOSPHATE SERPL-MCNC: 2 MG/DL — LOW (ref 2.5–4.5)
PLATELET # BLD AUTO: 192 K/UL — SIGNIFICANT CHANGE UP (ref 150–400)
POTASSIUM SERPL-MCNC: 3.6 MMOL/L — SIGNIFICANT CHANGE UP (ref 3.5–5.3)
POTASSIUM SERPL-SCNC: 3.6 MMOL/L — SIGNIFICANT CHANGE UP (ref 3.5–5.3)
PROT SERPL-MCNC: 5.5 G/DL — LOW (ref 6–8.3)
PROT UR-MCNC: 500 MG/DL
RBC # BLD: 3.3 M/UL — LOW (ref 4.2–5.8)
RBC # FLD: 13.6 % — SIGNIFICANT CHANGE UP (ref 10.3–14.5)
RBC CASTS # UR COMP ASSIST: >50 /HPF (ref 0–4)
SODIUM SERPL-SCNC: 133 MMOL/L — LOW (ref 135–145)
SP GR SPEC: 1.01 — SIGNIFICANT CHANGE UP (ref 1.01–1.02)
UROBILINOGEN FLD QL: 4 MG/DL
WBC # BLD: 11.79 K/UL — HIGH (ref 3.8–10.5)
WBC # FLD AUTO: 11.79 K/UL — HIGH (ref 3.8–10.5)
WBC UR QL: SIGNIFICANT CHANGE UP

## 2018-11-28 PROCEDURE — 71045 X-RAY EXAM CHEST 1 VIEW: CPT | Mod: 26

## 2018-11-28 PROCEDURE — 99233 SBSQ HOSP IP/OBS HIGH 50: CPT

## 2018-11-28 PROCEDURE — 99232 SBSQ HOSP IP/OBS MODERATE 35: CPT

## 2018-11-28 RX ORDER — POTASSIUM PHOSPHATE, MONOBASIC POTASSIUM PHOSPHATE, DIBASIC 236; 224 MG/ML; MG/ML
15 INJECTION, SOLUTION INTRAVENOUS ONCE
Qty: 0 | Refills: 0 | Status: COMPLETED | OUTPATIENT
Start: 2018-11-28 | End: 2018-11-28

## 2018-11-28 RX ORDER — INSULIN GLARGINE 100 [IU]/ML
6 INJECTION, SOLUTION SUBCUTANEOUS AT BEDTIME
Qty: 0 | Refills: 0 | Status: DISCONTINUED | OUTPATIENT
Start: 2018-11-28 | End: 2018-12-04

## 2018-11-28 RX ADMIN — Medication 4: at 23:16

## 2018-11-28 RX ADMIN — CHLORHEXIDINE GLUCONATE 15 MILLILITER(S): 213 SOLUTION TOPICAL at 17:26

## 2018-11-28 RX ADMIN — PIPERACILLIN AND TAZOBACTAM 25 GRAM(S): 4; .5 INJECTION, POWDER, LYOPHILIZED, FOR SOLUTION INTRAVENOUS at 21:42

## 2018-11-28 RX ADMIN — MIDODRINE HYDROCHLORIDE 5 MILLIGRAM(S): 2.5 TABLET ORAL at 05:05

## 2018-11-28 RX ADMIN — PIPERACILLIN AND TAZOBACTAM 25 GRAM(S): 4; .5 INJECTION, POWDER, LYOPHILIZED, FOR SOLUTION INTRAVENOUS at 05:06

## 2018-11-28 RX ADMIN — Medication 6: at 12:35

## 2018-11-28 RX ADMIN — PANTOPRAZOLE SODIUM 40 MILLIGRAM(S): 20 TABLET, DELAYED RELEASE ORAL at 12:35

## 2018-11-28 RX ADMIN — MIDODRINE HYDROCHLORIDE 5 MILLIGRAM(S): 2.5 TABLET ORAL at 15:01

## 2018-11-28 RX ADMIN — MIDODRINE HYDROCHLORIDE 5 MILLIGRAM(S): 2.5 TABLET ORAL at 21:42

## 2018-11-28 RX ADMIN — ATORVASTATIN CALCIUM 40 MILLIGRAM(S): 80 TABLET, FILM COATED ORAL at 21:42

## 2018-11-28 RX ADMIN — Medication 4: at 05:09

## 2018-11-28 RX ADMIN — CLOPIDOGREL BISULFATE 75 MILLIGRAM(S): 75 TABLET, FILM COATED ORAL at 12:35

## 2018-11-28 RX ADMIN — PIPERACILLIN AND TAZOBACTAM 25 GRAM(S): 4; .5 INJECTION, POWDER, LYOPHILIZED, FOR SOLUTION INTRAVENOUS at 15:00

## 2018-11-28 RX ADMIN — Medication 650 MILLIGRAM(S): at 13:30

## 2018-11-28 RX ADMIN — CHLORHEXIDINE GLUCONATE 15 MILLILITER(S): 213 SOLUTION TOPICAL at 05:06

## 2018-11-28 RX ADMIN — Medication 650 MILLIGRAM(S): at 12:34

## 2018-11-28 RX ADMIN — Medication 1 PACKET(S): at 05:04

## 2018-11-28 RX ADMIN — Medication 125 MICROGRAM(S): at 05:05

## 2018-11-28 RX ADMIN — POTASSIUM PHOSPHATE, MONOBASIC POTASSIUM PHOSPHATE, DIBASIC 85 MILLIMOLE(S): 236; 224 INJECTION, SOLUTION INTRAVENOUS at 10:06

## 2018-11-28 RX ADMIN — MIDAZOLAM HYDROCHLORIDE 1 MILLIGRAM(S): 1 INJECTION, SOLUTION INTRAMUSCULAR; INTRAVENOUS at 17:57

## 2018-11-28 RX ADMIN — CARBIDOPA AND LEVODOPA 1.5 TABLET(S): 25; 100 TABLET ORAL at 05:06

## 2018-11-28 RX ADMIN — INSULIN GLARGINE 6 UNIT(S): 100 INJECTION, SOLUTION SUBCUTANEOUS at 23:16

## 2018-11-28 RX ADMIN — AZITHROMYCIN 255 MILLIGRAM(S): 500 TABLET, FILM COATED ORAL at 21:14

## 2018-11-28 RX ADMIN — Medication 4: at 17:26

## 2018-11-28 RX ADMIN — Medication 325 MILLIGRAM(S): at 12:35

## 2018-11-28 RX ADMIN — CARBIDOPA AND LEVODOPA 1.5 TABLET(S): 25; 100 TABLET ORAL at 17:26

## 2018-11-28 NOTE — PROGRESS NOTE ADULT - SUBJECTIVE AND OBJECTIVE BOX
Patient is a 78y old  Male who presents with a chief complaint of Respiratory distress. (2018 08:03)      Patient seen in follow up for AMEYA. Remains on vent support. Family at bedside.     PAST MEDICAL HISTORY:  Dyslipidemia  Hypothyroidism  Parkinson's disease  Diabetes mellitus  CAD (coronary artery disease) of artery bypass graft  Benign essential hypertension  Adult hypothyroidism  Acquired hypothyroidism      MEDICATIONS  (STANDING):  aspirin 325 milliGRAM(s) Enteral Tube daily  atorvastatin 40 milliGRAM(s) Oral at bedtime  azithromycin  IVPB 500 milliGRAM(s) IV Intermittent every 24 hours  carbidopa/levodopa CR 25/100 1.5 Tablet(s) Oral two times a day  chlorhexidine 0.12% Liquid 15 milliLiter(s) Swish and Spit two times a day  clopidogrel Tablet 75 milliGRAM(s) Oral daily  dexmedetomidine Infusion 0.3 MICROgram(s)/kG/Hr (4.522 mL/Hr) IV Continuous <Continuous>  dextrose 5%. 1000 milliLiter(s) (50 mL/Hr) IV Continuous <Continuous>  dextrose 50% Injectable 12.5 Gram(s) IV Push once  dextrose 50% Injectable 25 Gram(s) IV Push once  dextrose 50% Injectable 25 Gram(s) IV Push once  insulin lispro (HumaLOG) corrective regimen sliding scale   SubCutaneous every 6 hours  levothyroxine 125 MICROGram(s) Enteral Tube daily  midodrine 5 milliGRAM(s) Oral three times a day  pantoprazole   Suspension 40 milliGRAM(s) Enteral Tube daily  piperacillin/tazobactam IVPB. 3.375 Gram(s) IV Intermittent every 8 hours  sodium chloride 0.9%. 1000 milliLiter(s) (75 mL/Hr) IV Continuous <Continuous>    MEDICATIONS  (PRN):  acetaminophen    Suspension .. 650 milliGRAM(s) Enteral Tube every 6 hours PRN Temp greater or equal to 38.5C (101.3F)  dextrose 40% Gel 15 Gram(s) Oral once PRN Blood Glucose LESS THAN 70 milliGRAM(s)/deciliter  glucagon  Injectable 1 milliGRAM(s) IntraMuscular once PRN Glucose LESS THAN 70 milligrams/deciliter  midazolam Injectable 1 milliGRAM(s) IV Push every 2 hours PRN sedation    T(C): 37.3 (18 @ 08:36), Max: 38.4 (18 @ 00:08)  HR: 70 (18 @ 10:49) (62 - 92)  BP: 117/61 (18 @ 08:00) (84/55 - 137/69)  RR: 25 (18 @ 08:00)  SpO2: 100% (18 @ 10:49)  Wt(kg): --  I&O's Detail    2018 07:01  -  2018 07:00  --------------------------------------------------------  IN:    Albumin 25%: 100 mL    Enteral Tube Flush: 600 mL    Glucerna: 960 mL    IV PiggyBack: 550 mL    sodium chloride 0.9%.: 1500 mL    Solution: 300 mL  Total IN: 4010 mL    OUT:    Indwelling Catheter - Suprapubic: 1100 mL  Total OUT: 1100 mL    Total NET: 2910 mL              PHYSICAL EXAM:  General: on vent  Respiratory: b/l air entry  Cardiovascular: S1 S2  Gastrointestinal: soft  Extremities:  no edema                     LABORATORY:                        9.1    11.79 )-----------( 192      ( 2018 06:49 )             27.3         133<L>  |  100  |  23  ----------------------------<  251<H>  3.6   |  23  |  1.27    Ca    7.7<L>      2018 06:49  Phos  2.0       Mg     1.8         TPro  5.5<L>  /  Alb  2.3<L>  /  TBili  1.3<H>  /  DBili  x   /  AST  251<H>  /  ALT  30  /  AlkPhos  152<H>      Sodium, Serum: 133 mmol/L ( @ 06:49)  Sodium, Serum: 135 mmol/L ( @ 06:54)  Sodium, Serum: 136 mmol/L ( @ 21:49)  Sodium, Serum: 137 mmol/L ( @ 17:54)    Potassium, Serum: 3.6 mmol/L ( @ 06:49)  Potassium, Serum: 3.1 mmol/L ( 06:54)  Potassium, Serum: 3.6 mmol/L ( 21:49)  Potassium, Serum: 5.8 mmol/L ( 17:54)    Hemoglobin: 9.1 g/dL ( 06:49)  Hemoglobin: 11.2 g/dL ( @ 06:54)  Hemoglobin: 14.1 g/dL ( @ 06:52)  Hemoglobin: 14.9 g/dL ( 20:27)    Creatinine, Serum 1.27 ( 06:49)  Creatinine, Serum 1.13 ( 06:54)  Creatinine, Serum 1.32 ( 21:49)  Creatinine, Serum 1.30 ( 17:54)    CARDIAC MARKERS ( 2018 06:54 )  .105 ng/mL / x     / x     / x     / x      CARDIAC MARKERS ( 2018 17:33 )  .122 ng/mL / x     / x     / x     / x          LIVER FUNCTIONS - ( 2018 06:49 )  Alb: 2.3 g/dL / Pro: 5.5 g/dL / ALK PHOS: 152 U/L / ALT: 30 U/L DA / AST: 251 U/L / GGT: x           Urinalysis Basic - ( 2018 08:09 )    Color: Red / Appearance: Turbid / S.010 / pH: x  Gluc: x / Ketone: Small  / Bili: Negative / Urobili: 4 mg/dL   Blood: x / Protein: 500 mg/dL / Nitrite: Negative   Leuk Esterase: Small / RBC: >50 /HPF / WBC 3-5   Sq Epi: x / Non Sq Epi: Few / Bacteria: Few      ABG - ( 2018 11:31 )  pH, Arterial: x     pH, Blood: 7.45  /  pCO2: 24    /  pO2: 121   / HCO3: 20    / Base Excess: -6.4  /  SaO2: 99 Patient is a 78y old  Male who presents with a chief complaint of Respiratory distress. (2018 08:03)      Patient seen in follow up for AMEYA. Remains on vent support. Family at bedside.     PAST MEDICAL HISTORY:  Dyslipidemia  Hypothyroidism  Parkinson's disease  Diabetes mellitus  CAD (coronary artery disease) of artery bypass graft  Benign essential hypertension  Adult hypothyroidism  Acquired hypothyroidism      MEDICATIONS  (STANDING):  aspirin 325 milliGRAM(s) Enteral Tube daily  atorvastatin 40 milliGRAM(s) Oral at bedtime  azithromycin  IVPB 500 milliGRAM(s) IV Intermittent every 24 hours  carbidopa/levodopa CR 25/100 1.5 Tablet(s) Oral two times a day  chlorhexidine 0.12% Liquid 15 milliLiter(s) Swish and Spit two times a day  clopidogrel Tablet 75 milliGRAM(s) Oral daily  dexmedetomidine Infusion 0.3 MICROgram(s)/kG/Hr (4.522 mL/Hr) IV Continuous <Continuous>  dextrose 5%. 1000 milliLiter(s) (50 mL/Hr) IV Continuous <Continuous>  dextrose 50% Injectable 12.5 Gram(s) IV Push once  dextrose 50% Injectable 25 Gram(s) IV Push once  dextrose 50% Injectable 25 Gram(s) IV Push once  insulin lispro (HumaLOG) corrective regimen sliding scale   SubCutaneous every 6 hours  levothyroxine 125 MICROGram(s) Enteral Tube daily  midodrine 5 milliGRAM(s) Oral three times a day  pantoprazole   Suspension 40 milliGRAM(s) Enteral Tube daily  piperacillin/tazobactam IVPB. 3.375 Gram(s) IV Intermittent every 8 hours  sodium chloride 0.9%. 1000 milliLiter(s) (75 mL/Hr) IV Continuous <Continuous>    MEDICATIONS  (PRN):  acetaminophen    Suspension .. 650 milliGRAM(s) Enteral Tube every 6 hours PRN Temp greater or equal to 38.5C (101.3F)  dextrose 40% Gel 15 Gram(s) Oral once PRN Blood Glucose LESS THAN 70 milliGRAM(s)/deciliter  glucagon  Injectable 1 milliGRAM(s) IntraMuscular once PRN Glucose LESS THAN 70 milligrams/deciliter  midazolam Injectable 1 milliGRAM(s) IV Push every 2 hours PRN sedation    T(C): 37.3 (18 @ 08:36), Max: 38.4 (18 @ 00:08)  HR: 70 (18 @ 10:49) (62 - 92)  BP: 117/61 (18 @ 08:00) (84/55 - 137/69)  RR: 25 (18 @ 08:00)  SpO2: 100% (18 @ 10:49)  Wt(kg): --  I&O's Detail    2018 07:01  -  2018 07:00  --------------------------------------------------------  IN:    Albumin 25%: 100 mL    Enteral Tube Flush: 600 mL    Glucerna: 960 mL    IV PiggyBack: 550 mL    sodium chloride 0.9%.: 1500 mL    Solution: 300 mL  Total IN: 4010 mL    OUT:    Indwelling Catheter - Suprapubic: 1100 mL  Total OUT: 1100 mL    Total NET: 2910 mL              PHYSICAL EXAM:  General: on vent  Respiratory: b/l air entry  Cardiovascular: S1 S2  Gastrointestinal: soft, supra pubic catheter  Extremities:  no edema                     LABORATORY:                        9.1    11.79 )-----------( 192      ( 2018 06:49 )             27.3         133<L>  |  100  |  23  ----------------------------<  251<H>  3.6   |  23  |  1.27    Ca    7.7<L>      2018 06:49  Phos  2.0       Mg     1.8         TPro  5.5<L>  /  Alb  2.3<L>  /  TBili  1.3<H>  /  DBili  x   /  AST  251<H>  /  ALT  30  /  AlkPhos  152<H>      Sodium, Serum: 133 mmol/L ( @ 06:49)  Sodium, Serum: 135 mmol/L ( @ 06:54)  Sodium, Serum: 136 mmol/L ( @ 21:49)  Sodium, Serum: 137 mmol/L ( @ 17:54)    Potassium, Serum: 3.6 mmol/L ( @ 06:49)  Potassium, Serum: 3.1 mmol/L ( 06:54)  Potassium, Serum: 3.6 mmol/L ( 21:49)  Potassium, Serum: 5.8 mmol/L ( 17:54)    Hemoglobin: 9.1 g/dL ( @ 06:49)  Hemoglobin: 11.2 g/dL ( 06:54)  Hemoglobin: 14.1 g/dL ( 06:52)  Hemoglobin: 14.9 g/dL ( 20:27)    Creatinine, Serum 1.27 ( 06:49)  Creatinine, Serum 1.13 ( 06:54)  Creatinine, Serum 1.32 ( 21:49)  Creatinine, Serum 1.30 ( 17:54)    CARDIAC MARKERS ( 2018 06:54 )  .105 ng/mL / x     / x     / x     / x      CARDIAC MARKERS ( 2018 17:33 )  .122 ng/mL / x     / x     / x     / x          LIVER FUNCTIONS - ( 2018 06:49 )  Alb: 2.3 g/dL / Pro: 5.5 g/dL / ALK PHOS: 152 U/L / ALT: 30 U/L DA / AST: 251 U/L / GGT: x           Urinalysis Basic - ( 2018 08:09 )    Color: Red / Appearance: Turbid / S.010 / pH: x  Gluc: x / Ketone: Small  / Bili: Negative / Urobili: 4 mg/dL   Blood: x / Protein: 500 mg/dL / Nitrite: Negative   Leuk Esterase: Small / RBC: >50 /HPF / WBC 3-5   Sq Epi: x / Non Sq Epi: Few / Bacteria: Few      ABG - ( 2018 11:31 )  pH, Arterial: x     pH, Blood: 7.45  /  pCO2: 24    /  pO2: 121   / HCO3: 20    / Base Excess: -6.4  /  SaO2: 99

## 2018-11-28 NOTE — PROGRESS NOTE ADULT - ASSESSMENT
·	AMEYA: Prerenal azotemia, ATN  ·	Shock, Sepsis  ·	Respiratory failure on vent   ·	Hypokalemia  ·	Hyponatremia    Improved renal function. On IVF. To continue current meds. Potassium and phos supps. Wean off vent as tolerated.   IV abx. ID follow up. Avoid nephrotoxic meds as possible. Will follow electrolytes and renal function trend. D/w family at bedside.

## 2018-11-28 NOTE — PROGRESS NOTE ADULT - ASSESSMENT
77 y/o M with PMH of DM, Dyslipidemia, CAD s/p CABG, PPM, Parkinson's Disease with Dementia, Hypothyroidism and BPH presented with respiratory distress.     1. Acute respiratory failure/Sepsis/PNA  -S/P intubation  Possibly secondary to CAP VS.  Aspiration PNA  -Continue with  current antibiotic as per ID  -RSV panel negative.  Blood culture NGTD.  -Further care as per pulmonary.  Trial of weaning started today.    2.  Acute renal failure With urinary retention.  Possibly due to ATN VS. Dehydration in the setting of infection.  -Suprapubic cath  was inserted as per urology  -Continue with IVF  -Avoid nephrotoxin  -nephrology to follow up      3. Coagulopathy. ML related to liver cell failure given the LFTs and current sepsis.    -Monitor LFT/PT/INR    4.  DM2  -Continue with ISS, and Monitor POC  -Hold oral medications    5. HX of CAD.  Continue with ASA, and Metoprolol    6.  Abnormal trop level  -Seems to be secondary to demand mismatch ischemia in the setting of sepsis, and infection  likely due to demand ischemia in setting of renal insufficiency and sepsis.    7.  Parkinson's disease.  Will resume home medications  -Patient has been on puree diet  -Son/Dr. Grider refuses Peg tube placement  -Considering hospice if survives hospitalization    DVT proph heparin    prognosis guarded to poor    8. Hypokalemia- resoved./ hypophosphatemia-   replte and check christen.

## 2018-11-28 NOTE — PROGRESS NOTE ADULT - SUBJECTIVE AND OBJECTIVE BOX
ID progress note    Name: SHON HAMEED  Age: 78y  Gender: Male  MRN: [id number]    Interval History-- Events noted low grade fevers, more awake, off sedation . On CPAP trial since this am, doing fairly well. Tolerating NGT feeds   Notes reviewed    Past Medical History--  Type 2 diabetes mellitus  Dyslipidemia  Hypothyroidism  Parkinson's disease  Diabetes mellitus  CAD (coronary artery disease) of artery bypass graft  Benign essential hypertension  Adult hypothyroidism  Acquired hypothyroidism  S/P CABG (coronary artery bypass graft)  Pacemaker      For details regarding the patient's social history, family history, and other miscellaneous elements, please refer the initial infectious diseases consultation and/or the admitting history and physical examination for this admission.    Allergies--  Allergies    No Known Allergies    Intolerances        Medications--  Antibiotics:  azithromycin  IVPB 500 milliGRAM(s) IV Intermittent every 24 hours  piperacillin/tazobactam IVPB. 3.375 Gram(s) IV Intermittent every 8 hours    Immunologic:    Other:  acetaminophen    Suspension .. PRN  aspirin  atorvastatin  carbidopa/levodopa CR 25/100  chlorhexidine 0.12% Liquid  clopidogrel Tablet  dexmedetomidine Infusion  dextrose 40% Gel PRN  dextrose 5%.  dextrose 50% Injectable  dextrose 50% Injectable  dextrose 50% Injectable  glucagon  Injectable PRN  insulin lispro (HumaLOG) corrective regimen sliding scale  levothyroxine  midazolam Injectable PRN  midodrine  pantoprazole   Suspension  sodium chloride 0.9%.      Review of Systems--  unable to obtain as on respirator     Physical Examination--  Vital Signs: T(F): 99.2 (11-28-18 @ 04:15), Max: 101.2 (11-27-18 @ 13:00)  HR: 68 (11-28-18 @ 08:00)  BP: 117/61 (11-28-18 @ 08:00)  RR: 25 (11-28-18 @ 08:00)  SpO2: 100% (11-28-18 @ 08:00)  Wt(kg): --  General: Nontoxic cachectic appearing Male intubated, opens eyes on name calling   HEENT: AT/NC. PERRL. EOMI. Anicteric. Conjunctiva pink and moist. ET tube and NGT  in place .  Neck: Not rigid. No sense of mass.  Nodes: None palpable.  Lungs: Coarse breath sounds  bilaterally without rales, wheezing or rhonchi  Heart: Regular rate and rhythm. No Murmur. No rub. No gallop. No palpable thrill.  Abdomen: Bowel sounds present and normoactive. Soft. Nondistended. Nontender. No sense of mass. No organomegaly.  Back: No spinal tenderness. No costovertebral angle tenderness.   Extremities: No cyanosis or clubbing. No edema.   Skin: Warm. Dry. Good turgor. No rash. No vasculitic stigmata.  Psychiatric: Appropriate affect and mood for situation.         Laboratory Studies--  CBC                        9.1    11.79 )-----------( 192      ( 28 Nov 2018 06:49 )             27.3       Chemistries  11-28    133<L>  |  100  |  23  ----------------------------<  251<H>  3.6   |  23  |  1.27    Ca    7.7<L>      28 Nov 2018 06:49  Phos  2.0     11-28  Mg     1.8     11-27    TPro  5.5<L>  /  Alb  2.3<L>  /  TBili  1.3<H>  /  DBili  x   /  AST  251<H>  /  ALT  30  /  AlkPhos  152<H>  11-28    Lactate, Blood: 3.0 mmol/L (11-28-18 @ 06:49)  Lactate, Blood: 3.0 mmol/L (11-27-18 @ 11:46)  Lactate, Blood: 3.1 mmol/L (11-27-18 @ 06:54)  Lactate, Blood: 4.9 mmol/L (11-26-18 @ 21:49)  Lactate, Blood: 4.9 mmol/L (11-26-18 @ 17:33)    CAPILLARY BLOOD GLUCOSE      POCT Blood Glucose.: 231 mg/dL (28 Nov 2018 05:03)  POCT Blood Glucose.: 231 mg/dL (27 Nov 2018 23:04)  POCT Blood Glucose.: 251 mg/dL (27 Nov 2018 17:31)  POCT Blood Glucose.: 248 mg/dL (27 Nov 2018 12:25)    Hemoglobin A1C, Whole Blood (11.26.18 @ 15:02)    Hemoglobin A1C, Whole Blood: 8.5:       Legionella pneumophila Antigen, Urine (11.26.18 @ 15:02)    Legionella Antigen, Urine: Negative      RECENT CULTURES    Culture - Sputum (collected 26 Nov 2018 17:15)  Source: .Sputum Sputum  Gram Stain (26 Nov 2018 21:35):    Moderate polymorphonuclear leukocytes per low power field    Rare Squamous epithelial cells per low power field    Moderate Gram positive cocci in pairs per oil power field    Few Gram Negative Rods per oil power field  Preliminary Report (27 Nov 2018 19:10):    Moderate Klebsiella pneumoniae    Normal Respiratory Beatriz present    Culture - Blood (collected 26 Nov 2018 01:50)  Source: .Blood Blood  Preliminary Report (27 Nov 2018 02:02):    No growth to date.    Culture - Blood (collected 26 Nov 2018 01:50)  Source: .Blood Blood  Preliminary Report (27 Nov 2018 02:02):    No growth to date.        RADIOLOGY:   Xray Chest 1 View- PORTABLE-Routine (11.27.18 @ 06:09) >  AP chest. Prior 11/26/2018.    Endotracheal tube nasogastric tube left cardiac pacer reidentified in   position. No change heart mediastinum. No change in the consolidative   pneumonia the right base. No pleural collection pneumothorax or other new   abnormality.    Impression: Essentially stable exam.      Assessment--  77 y/o M with PMH of DM, Dyslipidemia, CAD s/p CABG, PPM, Parkinson's Disease with Dementia, Hypothyroidism and BPH who presented with respiratory distress.  Has acute hypoxic respiratory failure secondary to  pneumonia likely aspiration as he has parkinson's diseases. Could have severe CAP too He was febrile and hypoxic , likely has severe sepsis with septic shock. He has been not on any pressors . Based on his presentation he will likely have positive blood cs   He likely has Gram negative pneumonia secondary to aspiration . Doubt its legionella., his urine legionella antigen is negative     he has poorly controlled DM    Plan:  - continue with IV zosyn and dc Zithromax , as  urine legionella antigen is negative   - weaning as per pulmonary   - may need PEG   - serial CXR  - trend lactate   - overall prognosis poor    I have discussed the above plan of care with patient's family in detail. They expressed understanding of the treatment plan . Risks, benefits and alternatives discussed in detail. I have asked if they have any questions or concerns and appropriately addressed them to the best of my ability .      > 35 minutes spent in direct patient care reviewing  the notes, lab data/ imaging , discussion with multidisciplinary team. All questions were addressed and answered to the best of my capacity .    Thank you for allowing me to participate in the care of your patient .        Stu Cage MD  [phone]

## 2018-11-28 NOTE — PROGRESS NOTE ADULT - SUBJECTIVE AND OBJECTIVE BOX
Patient is a 78y old  Male who presents with a chief complaint of Respiratory distress. (2018 09:09)      INTERVAL HPI/OVERNIGHT EVENTS:  Pt is seen and examined.  unable to obtain due to underlying medical condition.    Pain Location & Control:     MEDICATIONS  (STANDING):  aspirin 325 milliGRAM(s) Enteral Tube daily  atorvastatin 40 milliGRAM(s) Oral at bedtime  azithromycin  IVPB 500 milliGRAM(s) IV Intermittent every 24 hours  carbidopa/levodopa CR 25/100 1.5 Tablet(s) Oral two times a day  chlorhexidine 0.12% Liquid 15 milliLiter(s) Swish and Spit two times a day  clopidogrel Tablet 75 milliGRAM(s) Oral daily  dexmedetomidine Infusion 0.3 MICROgram(s)/kG/Hr (4.522 mL/Hr) IV Continuous <Continuous>  dextrose 5%. 1000 milliLiter(s) (50 mL/Hr) IV Continuous <Continuous>  dextrose 50% Injectable 12.5 Gram(s) IV Push once  dextrose 50% Injectable 25 Gram(s) IV Push once  dextrose 50% Injectable 25 Gram(s) IV Push once  insulin lispro (HumaLOG) corrective regimen sliding scale   SubCutaneous every 6 hours  levothyroxine 125 MICROGram(s) Enteral Tube daily  midodrine 5 milliGRAM(s) Oral three times a day  pantoprazole   Suspension 40 milliGRAM(s) Enteral Tube daily  piperacillin/tazobactam IVPB. 3.375 Gram(s) IV Intermittent every 8 hours  sodium chloride 0.9%. 1000 milliLiter(s) (75 mL/Hr) IV Continuous <Continuous>    MEDICATIONS  (PRN):  acetaminophen    Suspension .. 650 milliGRAM(s) Enteral Tube every 6 hours PRN Temp greater or equal to 38.5C (101.3F)  dextrose 40% Gel 15 Gram(s) Oral once PRN Blood Glucose LESS THAN 70 milliGRAM(s)/deciliter  glucagon  Injectable 1 milliGRAM(s) IntraMuscular once PRN Glucose LESS THAN 70 milligrams/deciliter  midazolam Injectable 1 milliGRAM(s) IV Push every 2 hours PRN sedation      Allergies    No Known Allergies    Intolerances            Vital Signs Last 24 Hrs  T(C): 37.3 (2018 08:36), Max: 38.4 (2018 13:00)  T(F): 99.2 (2018 08:36), Max: 101.2 (2018 13:00)  HR: 68 (2018 08:00) (64 - 86)  BP: 117/61 (2018 08:00) (91/55 - 137/69)  BP(mean): 77 (2018 08:00) (67 - 88)  RR: 25 (2018 08:00) (12 - 34)  SpO2: 100% (2018 08:00) (98% - 100%)        LABS:                        9.1    11.79 )-----------( 192      ( 2018 06:49 )             27.3     2018 06:49    133    |  100    |  23     ----------------------------<  251    3.6     |  23     |  1.27     Ca    7.7        2018 06:49  Phos  2.0       2018 08:00    TPro  5.5    /  Alb  2.3    /  TBili  1.3    /  DBili  x      /  AST  251    /  ALT  30     /  AlkPhos  152    2018 06:49      Urinalysis Basic - ( 2018 08:09 )    Color: Red / Appearance: Turbid / S.010 / pH: x  Gluc: x / Ketone: Small  / Bili: Negative / Urobili: 4 mg/dL   Blood: x / Protein: 500 mg/dL / Nitrite: Negative   Leuk Esterase: Small / RBC: >50 /HPF / WBC 3-5   Sq Epi: x / Non Sq Epi: Few / Bacteria: Few      CAPILLARY BLOOD GLUCOSE      POCT Blood Glucose.: 231 mg/dL (2018 05:03)  POCT Blood Glucose.: 231 mg/dL (2018 23:04)  POCT Blood Glucose.: 251 mg/dL (2018 17:31)  POCT Blood Glucose.: 248 mg/dL (2018 12:25)    CARDIAC MARKERS ( 2018 06:54 )  .105 ng/mL / x     / x     / x     / x      CARDIAC MARKERS ( 2018 17:33 )  .122 ng/mL / x     / x     / x     / x          Cultures  Culture Results:   Moderate Klebsiella pneumoniae  Normal Respiratory Beatriz present ( @ 17:15)  Culture Results:   No growth to date. ( @ 01:50)  Culture Results:   No growth to date. ( @ 01:50)    Lactate, Blood: 3.0 mmol/L ( @ 06:49)  Lactate, Blood: 3.0 mmol/L ( @ 11:46)      Culture - Sputum (collected 18 @ 17:15)  Source: .Sputum Sputum  Gram Stain (18 @ 21:35):    Moderate polymorphonuclear leukocytes per low power field    Rare Squamous epithelial cells per low power field    Moderate Gram positive cocci in pairs per oil power field    Few Gram Negative Rods per oil power field  Preliminary Report (18 @ 19:10):    Moderate Klebsiella pneumoniae    Normal Respiratory Beatriz present    Culture - Blood (collected 18 @ 01:50)  Source: .Blood Blood  Preliminary Report (18 @ 02:02):    No growth to date.    Culture - Blood (collected 18 @ 01:50)  Source: .Blood Blood  Preliminary Report (18 @ 02:02):    No growth to date.        RADIOLOGY & ADDITIONAL TESTS:    Imaging Personally Reviewed:  [ ] YES  [ ] NO    Consultant(s) Notes Reviewed:  [ x] YES  [ ] NO    Care Discussed with Consultants/Other Providers [ ] YES  [ ] NO

## 2018-11-28 NOTE — PROGRESS NOTE ADULT - ASSESSMENT
77 y/o M with PMH of DM, Dyslipidemia, CAD s/p CABG, PPM, Parkinson's Disease with Dementia, Hypothyroidism and BPH presented with respiratory distress.   Acute respiratory failure due to pneumonia, ?early ARDS. Still sob on vent, requiring versed.     Sepsis due to pneumonia with high lactate--improved.  Parkinsonism with dementia  DM with hi glu  Will attempt slow weaning today.  May take a long time to wean. Poor mental status.

## 2018-11-28 NOTE — PROGRESS NOTE ADULT - SUBJECTIVE AND OBJECTIVE BOX
PULMONARY/CRITICAL CARE      INTERVAL HPI/OVERNIGHT EVENTS:  No fever. Less sob. Sedated on vent.  78y MaleHPI:  This is an 79 y/o M with PMH of DM, Dyslipidemia, CAD s/p CABG, PPM, Parkinson's Disease with Dementia, Hypothyroidism and BPH who presented with respiratory distress. As per patient's wife at the bedside he was at his baseline condition till 4 days ago when she noticed that he closes his mouth & doesn't allow any thing to be put in his mouth, and she has to use a dripper to feed him. Yesterday she noticed that he is not responding, and today he was breathing fast with audible wheezing, so she brought him to the hospital, no cough, fever, or chills at home, also denies vomiting, no BM over the past 4 days. Wife stated also that patient "lost his voice" a month ago because of his parkinson's disease, but was able to understand and interact non verbally, till yesterday. (2018 23:07)        PAST MEDICAL & SURGICAL HISTORY:  Type 2 diabetes mellitus  Dyslipidemia  Hypothyroidism  Parkinson's disease  CAD (coronary artery disease) of artery bypass graft  Benign essential hypertension  S/P CABG (coronary artery bypass graft)  Pacemaker        ICU Vital Signs Last 24 Hrs  T(C): 37.3 (2018 08:36), Max: 38.4 (2018 13:00)  T(F): 99.2 (2018 08:36), Max: 101.2 (2018 13:00)  HR: 68 (2018 08:00) (64 - 86)  BP: 117/61 (2018 08:00) (91/55 - 137/69)  BP(mean): 77 (2018 08:00) (67 - 88)  ABP: --  ABP(mean): --  RR: 25 (2018 08:00) (12 - 34)  SpO2: 100% (2018 08:00) (98% - 100%)    Qtts:     I&O's Summary    2018 07:01  -  2018 07:00  --------------------------------------------------------  IN: 4010 mL / OUT: 1100 mL / NET: 2910 mL  REVIEW OF SYSTEMS:    CONSTITUTIONAL: No fever, weight loss, or fatigue  EYES: No eye pain, visual disturbances, or discharge  RESPIRATORY: No cough, wheezing, chills or hemoptysis; had  shortness of breath  CARDIOVASCULAR: No chest pain, palpitations, dizziness, or leg swelling  GASTROINTESTINAL: No abdominal or epigastric pain. No nausea, vomiting, or hematemesis; No diarrhea or constipation. No melena or hematochezia.  GENITOURINARY: No dysuria, frequency, hematuria, or incontinence  NEUROLOGICAL: No headaches, memory loss, loss of strength, numbness, or tremors  SKIN: No itching, burning, rashes, or lesions   LYMPH NODES: No enlarged glands      PHYSICAL EXAM:    GENERAL: thin elderly male, somewhat agitated on vent  HEAD:  Atraumatic, Normocephalic  EYES: EOMI, PERRLA, conjunctiva and sclera clear  ENMT: No tonsillar erythema, exudates, or enlargement; Moist mucous membranes, Good dentition, No lesions  NECK: Supple, No JVD, Normal thyroid  NERVOUS SYSTEM:  sedated, moves extremities  CHEST/LUNG: orally intubated, few rhonchi bilat , good excursion.  HEART: Regular rate and rhythm; No murmurs, rubs, or gallops  ABDOMEN: Soft, Nontender, Nondistended; Bowel sounds present  EXTREMITIES:  2+ Peripheral Pulses, No clubbing, cyanosis, or edema  LYMPH: No lymphadenopathy noted  SKIN: No rashes or lesions        Mode: CPAP with PS  FiO2: 40  PEEP: 5  ITime: 2  MAP: 10  PIP: 16            LABS:                        9.1    11.79 )-----------( 192      ( 2018 06:49 )             27.3     11-28    133<L>  |  100  |  23  ----------------------------<  251<H>  3.6   |  23  |  1.27    Ca    7.7<L>      2018 06:49  Phos  2.0     -  Mg     1.8     -    TPro  5.5<L>  /  Alb  2.3<L>  /  TBili  1.3<H>  /  DBili  x   /  AST  251<H>  /  ALT  30  /  AlkPhos  152<H>        Urinalysis Basic - ( 2018 08:09 )    Color: Red / Appearance: Turbid / S.010 / pH: x  Gluc: x / Ketone: Small  / Bili: Negative / Urobili: 4 mg/dL   Blood: x / Protein: 500 mg/dL / Nitrite: Negative   Leuk Esterase: Small / RBC: >50 /HPF / WBC 3-5   Sq Epi: x / Non Sq Epi: Few / Bacteria: Few      ABG - ( 2018 11:31 )  pH, Arterial: x     pH, Blood: 7.45  /  pCO2: 24    /  pO2: 121   / HCO3: 20    / Base Excess: -6.4  /  SaO2: 99                vanco through     RADIOLOGY & ADDITIONAL STUDIES:< from: Xray Chest 1 View- PORTABLE-Routine (18 @ 05:44) >  EXAM:  XR CHEST PORTABLE ROUTINE 1V                                  PROCEDURE DATE:  2018          INTERPRETATION:  Clinical information: Pneumonia    Portable study, 5:30 AM.    Comparison exam dated 2018.    Cardiac monitor leads present. Cardiac device overlies left upper thorax.   Endotracheal tube present, tip 3 cm above the kourtney. Sternal   sutures-mediastinal clips present. NG tube present, tip in stomach.    Airspace disease right lung base not significantly changed since the   prior exam. The left lung is normally expanded. Heart size within normal   limits. No acute osseous abnormalities.    IMPRESSION:    See above report    < end of copied text >        CRITICAL CARE TIME SPENT:

## 2018-11-28 NOTE — PROGRESS NOTE ADULT - SUBJECTIVE AND OBJECTIVE BOX
REASON FOR VISIT: Elevated troponin    HPI:  79 y/o man with a history of DM, Dyslipidemia, CAD s/p CABG, PPM, Parkinson's Disease with Dementia, Hypothyroidism and BPH admitted on 11/25 with fever to 106, altered mental status, acute hypoxic respiratory failure due to sepsis, pneumonia / possible aspiration or early ARDS.  Cardiology consult requested for elevated serum troponin.    11/28/18:  Lethargic on vent; opens eyes to tactile stimulation.    MEDICATIONS  (STANDING):  aspirin 325 milliGRAM(s) Enteral Tube daily  atorvastatin 40 milliGRAM(s) Oral at bedtime  azithromycin  IVPB 500 milliGRAM(s) IV Intermittent every 24 hours  carbidopa/levodopa CR 25/100 1.5 Tablet(s) Oral two times a day  chlorhexidine 0.12% Liquid 15 milliLiter(s) Swish and Spit two times a day  clopidogrel Tablet 75 milliGRAM(s) Oral daily  dexmedetomidine Infusion 0.3 MICROgram(s)/kG/Hr (4.522 mL/Hr) IV Continuous <Continuous>  insulin lispro (HumaLOG) corrective regimen sliding scale   SubCutaneous every 6 hours  levothyroxine 125 MICROGram(s) Enteral Tube daily  midodrine 5 milliGRAM(s) Oral three times a day  pantoprazole   Suspension 40 milliGRAM(s) Enteral Tube daily  piperacillin/tazobactam IVPB. 3.375 Gram(s) IV Intermittent every 8 hours  sodium chloride 0.9%. 1000 milliLiter(s) (75 mL/Hr) IV Continuous <Continuous>    MEDICATIONS  (PRN):  acetaminophen    Suspension .. 650 milliGRAM(s) Enteral Tube every 6 hours PRN Temp greater or equal to 38.5C (101.3F)  dextrose 40% Gel 15 Gram(s) Oral once PRN Blood Glucose LESS THAN 70 milliGRAM(s)/deciliter  glucagon  Injectable 1 milliGRAM(s) IntraMuscular once PRN Glucose LESS THAN 70 milligrams/deciliter  midazolam Injectable 1 milliGRAM(s) IV Push every 2 hours PRN sedation    Vital Signs Last 24 Hrs  T(C): 37.3 (28 Nov 2018 04:15), Max: 38.4 (27 Nov 2018 13:00)  T(F): 99.2 (28 Nov 2018 04:15), Max: 101.2 (27 Nov 2018 13:00)  HR: 65 (28 Nov 2018 07:13) (64 - 89)  BP: 105/59 (28 Nov 2018 06:00) (91/55 - 137/69)  BP(mean): 73 (28 Nov 2018 06:00) (67 - 88)  RR: 15 (28 Nov 2018 06:00) (12 - 35)  SpO2: 100% (28 Nov 2018 07:13) (98% - 100%)    PHYSICAL EXAM:  Constitutional: Lethargic on vent  Pulmonary: Lungs are CTA b/l  Cardiovascular: Regular, normal S1 and S2  Gastrointestinal: Bowel Sounds present, soft, nontender, PEG  Lymph: No pedal edema. No cervical lymphadenopathy.  Skin: Warm, dry    LABS:         CARDIAC MARKERS ( 27 Nov 2018 06:54 ) .105 ng/mL / x     / x     / x     / x      CARDIAC MARKERS ( 26 Nov 2018 17:33 ) .122 ng/mL / x     / x     / x     / x      CARDIAC MARKERS ( 26 Nov 2018 10:00 ) .179 ng/mL / x     / x     / x     / x                     9.1    11.79 )-----------( 192      ( 28 Nov 2018 06:49 )             27.3     133<L>  |  100  |  23  ----------------------------<  251<H>  3.6   |  23  |  1.27    Ca    7.7<L>      28 Nov 2018 06:49  Phos  2.1     11-27  Mg     1.8     11-27    TPro  5.5<L>  /  Alb  2.3<L>  /  TBili  1.3<H>  /  DBili  x   /  AST  251<H>  /  ALT  30  /  AlkPhos  152<H>  11-28    Transthoracic Echocardiogram w/Doppler Complete (11.26.18 @ 14:02):  1. Endocardium not well visualized, especially in the apical windows. Grossly normal left ventricular size with mild systolic dysfunction.  LVEF estimated at 48%. Mild diastolic dysfunction(stage I).  2. Moderate mitral regurgitation.  3. Aortic valve sclerosis. Mild to moderate aortic insufficiency.  4. Mild to moderate tricuspid regurgitation.     Tele: Sinus rhythm

## 2018-11-29 LAB
-  AMIKACIN: SIGNIFICANT CHANGE UP
-  AMOXICILLIN/CLAVULANIC ACID: SIGNIFICANT CHANGE UP
-  AMPICILLIN/SULBACTAM: SIGNIFICANT CHANGE UP
-  AMPICILLIN: SIGNIFICANT CHANGE UP
-  AZTREONAM: SIGNIFICANT CHANGE UP
-  CEFAZOLIN: SIGNIFICANT CHANGE UP
-  CEFEPIME: SIGNIFICANT CHANGE UP
-  CEFOXITIN: SIGNIFICANT CHANGE UP
-  CEFTRIAXONE: SIGNIFICANT CHANGE UP
-  CIPROFLOXACIN: SIGNIFICANT CHANGE UP
-  ERTAPENEM: SIGNIFICANT CHANGE UP
-  GENTAMICIN: SIGNIFICANT CHANGE UP
-  IMIPENEM: SIGNIFICANT CHANGE UP
-  LEVOFLOXACIN: SIGNIFICANT CHANGE UP
-  MEROPENEM: SIGNIFICANT CHANGE UP
-  PIPERACILLIN/TAZOBACTAM: SIGNIFICANT CHANGE UP
-  TOBRAMYCIN: SIGNIFICANT CHANGE UP
-  TRIMETHOPRIM/SULFAMETHOXAZOLE: SIGNIFICANT CHANGE UP
ALBUMIN SERPL ELPH-MCNC: 1.9 G/DL — LOW (ref 3.3–5)
ALP SERPL-CCNC: 196 U/L — HIGH (ref 30–120)
ALT FLD-CCNC: 69 U/L DA — HIGH (ref 10–60)
ANION GAP SERPL CALC-SCNC: 10 MMOL/L — SIGNIFICANT CHANGE UP (ref 5–17)
AST SERPL-CCNC: 183 U/L — HIGH (ref 10–40)
BASE EXCESS BLDA CALC-SCNC: -2 MMOL/L — SIGNIFICANT CHANGE UP (ref -2–2)
BASOPHILS # BLD AUTO: 0.03 K/UL — SIGNIFICANT CHANGE UP (ref 0–0.2)
BASOPHILS NFR BLD AUTO: 0.3 % — SIGNIFICANT CHANGE UP (ref 0–2)
BILIRUB SERPL-MCNC: 1 MG/DL — SIGNIFICANT CHANGE UP (ref 0.2–1.2)
BLOOD GAS COMMENTS: SIGNIFICANT CHANGE UP
BLOOD GAS SOURCE: SIGNIFICANT CHANGE UP
BUN SERPL-MCNC: 19 MG/DL — SIGNIFICANT CHANGE UP (ref 7–23)
CALCIUM SERPL-MCNC: 8 MG/DL — LOW (ref 8.4–10.5)
CHLORIDE SERPL-SCNC: 102 MMOL/L — SIGNIFICANT CHANGE UP (ref 96–108)
CO2 SERPL-SCNC: 24 MMOL/L — SIGNIFICANT CHANGE UP (ref 22–31)
CREAT SERPL-MCNC: 1.2 MG/DL — SIGNIFICANT CHANGE UP (ref 0.5–1.3)
CULTURE RESULTS: NO GROWTH — SIGNIFICANT CHANGE UP
CULTURE RESULTS: SIGNIFICANT CHANGE UP
EOSINOPHIL # BLD AUTO: 0.32 K/UL — SIGNIFICANT CHANGE UP (ref 0–0.5)
EOSINOPHIL NFR BLD AUTO: 3.3 % — SIGNIFICANT CHANGE UP (ref 0–6)
GLUCOSE SERPL-MCNC: 182 MG/DL — HIGH (ref 70–99)
GRAM STN FLD: SIGNIFICANT CHANGE UP
HCO3 BLDA-SCNC: 23 MMOL/L — SIGNIFICANT CHANGE UP (ref 21–29)
HCT VFR BLD CALC: 30.8 % — LOW (ref 39–50)
HGB BLD-MCNC: 10.2 G/DL — LOW (ref 13–17)
HOROWITZ INDEX BLDA+IHG-RTO: 40 — SIGNIFICANT CHANGE UP
IMM GRANULOCYTES NFR BLD AUTO: 0.8 % — SIGNIFICANT CHANGE UP (ref 0–1.5)
LACTATE SERPL-SCNC: 2.5 MMOL/L — HIGH (ref 0.7–2)
LYMPHOCYTES # BLD AUTO: 1.64 K/UL — SIGNIFICANT CHANGE UP (ref 1–3.3)
LYMPHOCYTES # BLD AUTO: 16.9 % — SIGNIFICANT CHANGE UP (ref 13–44)
MCHC RBC-ENTMCNC: 27.5 PG — SIGNIFICANT CHANGE UP (ref 27–34)
MCHC RBC-ENTMCNC: 33.1 GM/DL — SIGNIFICANT CHANGE UP (ref 32–36)
MCV RBC AUTO: 83 FL — SIGNIFICANT CHANGE UP (ref 80–100)
METHOD TYPE: SIGNIFICANT CHANGE UP
MONOCYTES # BLD AUTO: 0.58 K/UL — SIGNIFICANT CHANGE UP (ref 0–0.9)
MONOCYTES NFR BLD AUTO: 6 % — SIGNIFICANT CHANGE UP (ref 2–14)
NEUTROPHILS # BLD AUTO: 7.04 K/UL — SIGNIFICANT CHANGE UP (ref 1.8–7.4)
NEUTROPHILS NFR BLD AUTO: 72.7 % — SIGNIFICANT CHANGE UP (ref 43–77)
NRBC # BLD: 0 /100 WBCS — SIGNIFICANT CHANGE UP (ref 0–0)
ORGANISM # SPEC MICROSCOPIC CNT: SIGNIFICANT CHANGE UP
PCO2 BLDA: 30 MMHG — LOW (ref 32–46)
PH BLD: 7.46 — HIGH (ref 7.35–7.45)
PHOSPHATE SERPL-MCNC: 2.8 MG/DL — SIGNIFICANT CHANGE UP (ref 2.5–4.5)
PLATELET # BLD AUTO: 190 K/UL — SIGNIFICANT CHANGE UP (ref 150–400)
PO2 BLDA: 159 MMHG — HIGH (ref 74–108)
POTASSIUM SERPL-MCNC: 3.6 MMOL/L — SIGNIFICANT CHANGE UP (ref 3.5–5.3)
POTASSIUM SERPL-SCNC: 3.6 MMOL/L — SIGNIFICANT CHANGE UP (ref 3.5–5.3)
PROT SERPL-MCNC: 5.5 G/DL — LOW (ref 6–8.3)
RBC # BLD: 3.71 M/UL — LOW (ref 4.2–5.8)
RBC # FLD: 13.9 % — SIGNIFICANT CHANGE UP (ref 10.3–14.5)
SAO2 % BLDA: 100 % — HIGH (ref 92–96)
SODIUM SERPL-SCNC: 136 MMOL/L — SIGNIFICANT CHANGE UP (ref 135–145)
SPECIMEN SOURCE: SIGNIFICANT CHANGE UP
WBC # BLD: 9.69 K/UL — SIGNIFICANT CHANGE UP (ref 3.8–10.5)
WBC # FLD AUTO: 9.69 K/UL — SIGNIFICANT CHANGE UP (ref 3.8–10.5)

## 2018-11-29 PROCEDURE — 99233 SBSQ HOSP IP/OBS HIGH 50: CPT

## 2018-11-29 PROCEDURE — 71045 X-RAY EXAM CHEST 1 VIEW: CPT | Mod: 26

## 2018-11-29 RX ORDER — HEPARIN SODIUM 5000 [USP'U]/ML
5000 INJECTION INTRAVENOUS; SUBCUTANEOUS EVERY 12 HOURS
Qty: 0 | Refills: 0 | Status: DISCONTINUED | OUTPATIENT
Start: 2018-11-29 | End: 2018-12-04

## 2018-11-29 RX ADMIN — CLOPIDOGREL BISULFATE 75 MILLIGRAM(S): 75 TABLET, FILM COATED ORAL at 12:44

## 2018-11-29 RX ADMIN — Medication 2: at 06:28

## 2018-11-29 RX ADMIN — SODIUM CHLORIDE 50 MILLILITER(S): 9 INJECTION INTRAMUSCULAR; INTRAVENOUS; SUBCUTANEOUS at 06:21

## 2018-11-29 RX ADMIN — Medication 2: at 12:44

## 2018-11-29 RX ADMIN — MIDODRINE HYDROCHLORIDE 5 MILLIGRAM(S): 2.5 TABLET ORAL at 06:21

## 2018-11-29 RX ADMIN — Medication 325 MILLIGRAM(S): at 12:44

## 2018-11-29 RX ADMIN — Medication 125 MICROGRAM(S): at 06:21

## 2018-11-29 RX ADMIN — Medication 2: at 23:05

## 2018-11-29 RX ADMIN — CHLORHEXIDINE GLUCONATE 15 MILLILITER(S): 213 SOLUTION TOPICAL at 06:21

## 2018-11-29 RX ADMIN — MIDODRINE HYDROCHLORIDE 5 MILLIGRAM(S): 2.5 TABLET ORAL at 21:47

## 2018-11-29 RX ADMIN — Medication 2: at 17:56

## 2018-11-29 RX ADMIN — INSULIN GLARGINE 6 UNIT(S): 100 INJECTION, SOLUTION SUBCUTANEOUS at 23:04

## 2018-11-29 RX ADMIN — CHLORHEXIDINE GLUCONATE 15 MILLILITER(S): 213 SOLUTION TOPICAL at 17:43

## 2018-11-29 RX ADMIN — CARBIDOPA AND LEVODOPA 1.5 TABLET(S): 25; 100 TABLET ORAL at 06:21

## 2018-11-29 RX ADMIN — PIPERACILLIN AND TAZOBACTAM 25 GRAM(S): 4; .5 INJECTION, POWDER, LYOPHILIZED, FOR SOLUTION INTRAVENOUS at 21:47

## 2018-11-29 RX ADMIN — PANTOPRAZOLE SODIUM 40 MILLIGRAM(S): 20 TABLET, DELAYED RELEASE ORAL at 12:48

## 2018-11-29 RX ADMIN — CARBIDOPA AND LEVODOPA 1.5 TABLET(S): 25; 100 TABLET ORAL at 17:43

## 2018-11-29 RX ADMIN — HEPARIN SODIUM 5000 UNIT(S): 5000 INJECTION INTRAVENOUS; SUBCUTANEOUS at 17:43

## 2018-11-29 RX ADMIN — ATORVASTATIN CALCIUM 40 MILLIGRAM(S): 80 TABLET, FILM COATED ORAL at 21:47

## 2018-11-29 RX ADMIN — MIDODRINE HYDROCHLORIDE 5 MILLIGRAM(S): 2.5 TABLET ORAL at 14:10

## 2018-11-29 RX ADMIN — PIPERACILLIN AND TAZOBACTAM 25 GRAM(S): 4; .5 INJECTION, POWDER, LYOPHILIZED, FOR SOLUTION INTRAVENOUS at 06:21

## 2018-11-29 RX ADMIN — Medication 650 MILLIGRAM(S): at 13:00

## 2018-11-29 RX ADMIN — PIPERACILLIN AND TAZOBACTAM 25 GRAM(S): 4; .5 INJECTION, POWDER, LYOPHILIZED, FOR SOLUTION INTRAVENOUS at 14:10

## 2018-11-29 RX ADMIN — Medication 650 MILLIGRAM(S): at 12:44

## 2018-11-29 NOTE — CHART NOTE - NSCHARTNOTEFT_GEN_A_CORE
Assessment: 78 year old male adm from home c PNA (likely asp) and sepsis.  Pt remains intubated, tube feedings of Glucerna 1.2 via NGT were infusing at 40ml/hr x24hrs, which was not meeting pt's increased/estimated needs. Discussed c current hospitalist, tube feed increased to goal of 60ml/hr (to provide 1728kcal, 86g protein); currently running at 50ml/hr and will be increased again later today.  Per chart, trial of weaning started today.  Per MD note, pt's son(who is a md) refuses peg placement if it were to become medically warranted. Noted pt incontinent of mucoid stool today.  RD to continue to monitor and f/u to assess tube feed tolerance/intake.    Factors impacting intake: [ ] none [ ] nausea  [ ] vomiting [ ] diarrhea [ ] constipation  [ ]chewing problems [ ] swallowing issues  [ x] other:  intubated on ngt feeds    Diet Presciption: Diet, NPO with Tube Feed:   Tube Feeding Modality: Nasogastric  Glucerna 1.2 Kush  Total Volume for 24 Hours (mL): 1440  Continuous  Starting Tube Feed Rate {mL per Hour}: 40  Increase Tube Feed Rate by (mL): 10     Every 6 hours  Until Goal Tube Feed Rate (mL per Hour): 60  Tube Feed Duration (in Hours): 24  Tube Feed Start Time: 12:00 (11-29-18 @ 11:22)    Intake: tolerating Glucerna 1.2 @goal rate 40ml/hr x 24hrs, TF increased today    Current Weight: Weight (kg): 60.3 (11-25 @ 20:00)  11/29 136.6#, 11/28 136.9#, 4# wt gain since adm, no edema noted    Pertinent Medications: MEDICATIONS  (STANDING):  aspirin 325 milliGRAM(s) Enteral Tube daily  atorvastatin 40 milliGRAM(s) Oral at bedtime  carbidopa/levodopa CR 25/100 1.5 Tablet(s) Oral two times a day  chlorhexidine 0.12% Liquid 15 milliLiter(s) Swish and Spit two times a day  clopidogrel Tablet 75 milliGRAM(s) Oral daily  dexmedetomidine Infusion 0.3 MICROgram(s)/kG/Hr (4.522 mL/Hr) IV Continuous <Continuous>  dextrose 5%. 1000 milliLiter(s) (50 mL/Hr) IV Continuous <Continuous>  dextrose 50% Injectable 12.5 Gram(s) IV Push once  dextrose 50% Injectable 25 Gram(s) IV Push once  dextrose 50% Injectable 25 Gram(s) IV Push once  heparin  Injectable 5000 Unit(s) SubCutaneous every 12 hours  insulin glargine Injectable (LANTUS) 6 Unit(s) SubCutaneous at bedtime  insulin lispro (HumaLOG) corrective regimen sliding scale   SubCutaneous every 6 hours  levothyroxine 125 MICROGram(s) Enteral Tube daily  midodrine 5 milliGRAM(s) Oral three times a day  pantoprazole   Suspension 40 milliGRAM(s) Enteral Tube daily  piperacillin/tazobactam IVPB. 3.375 Gram(s) IV Intermittent every 8 hours  sodium chloride 0.9%. 1000 milliLiter(s) (50 mL/Hr) IV Continuous <Continuous>    MEDICATIONS  (PRN):  acetaminophen    Suspension .. 650 milliGRAM(s) Enteral Tube every 6 hours PRN Temp greater or equal to 38.5C (101.3F)  dextrose 40% Gel 15 Gram(s) Oral once PRN Blood Glucose LESS THAN 70 milliGRAM(s)/deciliter  glucagon  Injectable 1 milliGRAM(s) IntraMuscular once PRN Glucose LESS THAN 70 milligrams/deciliter  midazolam Injectable 1 milliGRAM(s) IV Push every 2 hours PRN sedation    Pertinent Labs: 11-29 Na136 mmol/L Glu 182 mg/dL<H> K+ 3.6 mmol/L Cr  1.20 mg/dL BUN 19 mg/dL 11-29 Phos 2.8 mg/dL 11-29 Alb 1.9 g/dL<L> 11-27 [id number] 8.2 %<H>     CAPILLARY BLOOD GLUCOSE      POCT Blood Glucose.: 173 mg/dL (29 Nov 2018 12:31)  POCT Blood Glucose.: 183 mg/dL (29 Nov 2018 06:25)  POCT Blood Glucose.: 209 mg/dL (28 Nov 2018 23:11)  POCT Blood Glucose.: 237 mg/dL (28 Nov 2018 17:04)    Skin: sacrum stage II, coccyx stage II, L top foot unstaged/dtpi    Estimated Needs:   [x ] no change since previous assessment  [ ] recalculated:     Previous Nutrition Diagnosis:   [ ] Inadequate Energy Intake [x ]Inadequate Oral Intake [ ] Excessive Energy Intake   [ ] Underweight [ ] Increased Nutrient Needs [ ] Overweight/Obesity   [ ] Altered GI Function [ ] Unintended Weight Loss [ ] Food & Nutrition Related Knowledge Deficit [ ] Malnutrition     Nutrition Diagnosis is [ ] ongoing  [ ] resolved [ ] not applicable     New Nutrition Diagnosis: [ ] not applicable       Interventions: c/w Glucerna 1.2 via ngt, increase to 60ml x 24hrs  Recommend  [ ] Change Diet To:  [ ] Nutrition Supplement  [ ] Nutrition Support  [ x] Other: SLP eval once extubated    Monitoring and Evaluation:   [ ] PO intake [ x ] Tolerance to TF [ x ] weights [ x ] labs[ x ] follow up per protocol  [ ] other:

## 2018-11-29 NOTE — PROGRESS NOTE ADULT - SUBJECTIVE AND OBJECTIVE BOX
ID Progress note    Name: SHON HAMEED  Age: 78y  Gender: Male  MRN: [id number]    Interval History-- Events noted more lethargic today , afebrile , doing well on CPAP . Family at bedside , still not decided on DNR and DNI   Notes reviewed    Past Medical History--  Type 2 diabetes mellitus  Dyslipidemia  Hypothyroidism  Parkinson's disease  Diabetes mellitus  CAD (coronary artery disease) of artery bypass graft  Benign essential hypertension  Adult hypothyroidism  Acquired hypothyroidism  S/P CABG (coronary artery bypass graft)  Pacemaker      For details regarding the patient's social history, family history, and other miscellaneous elements, please refer the initial infectious diseases consultation and/or the admitting history and physical examination for this admission.    Allergies--  Allergies    No Known Allergies    Intolerances        Medications--  Antibiotics:  azithromycin  IVPB 500 milliGRAM(s) IV Intermittent every 24 hours  piperacillin/tazobactam IVPB. 3.375 Gram(s) IV Intermittent every 8 hours    Immunologic:    Other:  acetaminophen    Suspension .. PRN  aspirin  atorvastatin  carbidopa/levodopa CR 25/100  chlorhexidine 0.12% Liquid  clopidogrel Tablet  dexmedetomidine Infusion  dextrose 40% Gel PRN  dextrose 5%.  dextrose 50% Injectable  dextrose 50% Injectable  dextrose 50% Injectable  glucagon  Injectable PRN  heparin  Injectable  insulin glargine Injectable (LANTUS)  insulin lispro (HumaLOG) corrective regimen sliding scale  levothyroxine  midazolam Injectable PRN  midodrine  pantoprazole   Suspension  sodium chloride 0.9%.      Review of Systems--  Review of systems unable to be obtained secondary to clinical condition.    Physical Examination--    Vital Signs: T(F): 100 (11-29-18 @ 08:39), Max: 100 (11-28-18 @ 12:11)  HR: 66 (11-29-18 @ 08:00)  BP: 113/65 (11-29-18 @ 08:00)  RR: 24 (11-29-18 @ 08:00)  SpO2: 100% (11-29-18 @ 08:00)  Wt(kg): --  General: Nontoxic cachectic appearing Male intubated, more lethargic today    HEENT: AT/NC. PERRL. EOMI. Anicteric. Conjunctiva pink and moist. ET tube and NGT  in place .  Neck: Not rigid. No sense of mass.  Nodes: None palpable.  Lungs: Coarse breath sounds  bilaterally without rales, wheezing or rhonchi  Heart: Regular rate and rhythm. No Murmur. No rub. No gallop. No palpable thrill.  Abdomen: Bowel sounds present and normoactive. Soft. Nondistended. Nontender. No sense of mass. No organomegaly.  Back: No spinal tenderness. No costovertebral angle tenderness.   Extremities: No cyanosis or clubbing. No edema.   Skin: Warm. Dry. Good turgor. No rash. No vasculitic stigmata.  Psychiatric: Appropriate affect and mood for situation.     Laboratory Studies--  CBC                        10.2   9.69  )-----------( 190      ( 29 Nov 2018 06:56 )             30.8       Chemistries  11-29    136  |  102  |  19  ----------------------------<  182<H>  3.6   |  24  |  1.20    Ca    8.0<L>      29 Nov 2018 06:56  Phos  2.8     11-29    TPro  5.5<L>  /  Alb  1.9<L>  /  TBili  1.0  /  DBili  x   /  AST  183<H>  /  ALT  69<H>  /  AlkPhos  196<H>  11-29    CAPILLARY BLOOD GLUCOSE      POCT Blood Glucose.: 183 mg/dL (29 Nov 2018 06:25)  POCT Blood Glucose.: 209 mg/dL (28 Nov 2018 23:11)  POCT Blood Glucose.: 237 mg/dL (28 Nov 2018 17:04)  POCT Blood Glucose.: 277 mg/dL (28 Nov 2018 12:30)    Blood Gas Profile - Arterial (11.29.18 @ 10:03)    Blood Gas Source: Arterial    Blood Gas Comments: cpap+5 ps10    pH, Blood: 7.46    pCO2, Arterial: 30 mmHg    pO2, Arterial: 159 mmHg    HCO3, Arterial: 23 mmoL/L    Base Excess, Arterial: -2.0 mmol/L    Oxygen Saturation, Arterial: 100 %    FIO2, Arterial: 40      Culture Data    Culture - Sputum . (11.26.18 @ 17:15)    Gram Stain:   Moderate polymorphonuclear leukocytes per low power field  Rare Squamous epithelial cells per low power field  Moderate Gram positive cocci in pairs per oil power field  Few Gram Negative Rods per oil power field    -  Amikacin: S <=8    -  Amoxicillin/Clavulanic Acid: S <=8/4    -  Ampicillin: R >16 These ampicillin results predict results for amoxicillin    -  Ampicillin/Sulbactam: S 8/4    -  Aztreonam: S <=4    -  Cefazolin: S <=2    -  Cefepime: S <=2    -  Cefoxitin: S <=4    -  Ceftriaxone: S <=1 Enterobacter, Citrobacter, and Serratia may develop resistance during prolonged therapy    -  Ciprofloxacin: S <=0.5    -  Ertapenem: S <=0.5    -  Gentamicin: S <=1    -  Imipenem: S <=1    -  Levofloxacin: S <=1    -  Meropenem: S <=1    -  Piperacillin/Tazobactam: S <=8    -  Tobramycin: S <=2    -  Trimethoprim/Sulfamethoxazole: S <=0.5/9.5    Specimen Source: .Sputum Sputum    Culture Results:   Moderate Klebsiella pneumoniae  Moderate Escherichia coli  Normal Respiratory Beatriz present    Organism Identification: Klebsiella pneumoniae    Organism: Klebsiella pneumoniae    Method Type: GLENDA        Culture - Blood (collected 26 Nov 2018 01:50)  Source: .Blood Blood  Preliminary Report (27 Nov 2018 02:02):    No growth to date.    Culture - Blood (collected 26 Nov 2018 01:50)  Source: .Blood Blood  Preliminary Report (27 Nov 2018 02:02):    No growth to date.    Legionella pneumophila Antigen, Urine (11.26.18 @ 15:02)    Legionella Antigen, Urine: Negative      Radiology:  Xray Chest 1 View- PORTABLE-Routine (11.29.18 @ 05:56) >  IMPRESSION:  ET tube, feeding tube, left cardiac device and sternotomy wires again   noted. No pneumothorax.    Airspace opacity right lung base slightly progressed. Right apical   opacification unchanged    No significant pleural effusion.    Heart size cannot be accurately assessed in this projection.      Assessment--  77 y/o M with PMH of DM, Dyslipidemia, CAD s/p CABG, PPM, Parkinson's Disease with Dementia, Hypothyroidism and BPH who presented with respiratory distress.  Has acute hypoxic respiratory failure secondary to  pneumonia likely aspiration as he has parkinson's diseases. Could have severe CAP too He was febrile and hypoxic , likely has severe sepsis with septic shock. He has been not on any pressors . Based on his presentation he will likely have positive blood cs   He likely has Gram negative pneumonia secondary to aspiration . Doubt its legionella., his urine legionella antigen is negative     he has poorly controlled DM    Plan:  - continue with IV zosyn and dc Zithromax , as  urine legionella antigen is negative   - weaning as per pulmonary   - may need PEG , family likely to refuse PEG  - serial CXR  - trend lactate   - overall prognosis poor    Continue with present regime .  Appropriate use of antibiotics and adverse effects reviewed.      I have discussed the above plan of care with patient's wife and son in detail. They expressed understanding of the treatment plan . Risks, benefits and alternatives discussed in detail. I have asked if they have any questions or concerns and appropriately addressed them to the best of my ability . Goal of care conversation to be done       > 35 minutes spent in direct patient care reviewing  the notes, lab data/ imaging , discussion with multidisciplinary team. All questions were addressed and answered to the best of my capacity .    Thank you for allowing me to participate in the care of your patient .        Stu Cage MD  [phone]

## 2018-11-29 NOTE — PROGRESS NOTE ADULT - ATTENDING COMMENTS
Plan of care was discuss with family  all questions were answered, seems understand and in agreement.

## 2018-11-29 NOTE — PROGRESS NOTE ADULT - SUBJECTIVE AND OBJECTIVE BOX
Patient is a 78y old  Male who presents with a chief complaint of Respiratory distress. (2018 10:20)      INTERVAL HPI/OVERNIGHT EVENTS:  Patient is seen and examined.  history  not obtained due to AMS and underlying medical condition.  Pain Location & Control:     MEDICATIONS  (STANDING):  aspirin 325 milliGRAM(s) Enteral Tube daily  atorvastatin 40 milliGRAM(s) Oral at bedtime  carbidopa/levodopa CR 25/100 1.5 Tablet(s) Oral two times a day  chlorhexidine 0.12% Liquid 15 milliLiter(s) Swish and Spit two times a day  clopidogrel Tablet 75 milliGRAM(s) Oral daily  dexmedetomidine Infusion 0.3 MICROgram(s)/kG/Hr (4.522 mL/Hr) IV Continuous <Continuous>  dextrose 5%. 1000 milliLiter(s) (50 mL/Hr) IV Continuous <Continuous>  dextrose 50% Injectable 12.5 Gram(s) IV Push once  dextrose 50% Injectable 25 Gram(s) IV Push once  dextrose 50% Injectable 25 Gram(s) IV Push once  heparin  Injectable 5000 Unit(s) SubCutaneous every 12 hours  insulin glargine Injectable (LANTUS) 6 Unit(s) SubCutaneous at bedtime  insulin lispro (HumaLOG) corrective regimen sliding scale   SubCutaneous every 6 hours  levothyroxine 125 MICROGram(s) Enteral Tube daily  midodrine 5 milliGRAM(s) Oral three times a day  pantoprazole   Suspension 40 milliGRAM(s) Enteral Tube daily  piperacillin/tazobactam IVPB. 3.375 Gram(s) IV Intermittent every 8 hours  sodium chloride 0.9%. 1000 milliLiter(s) (50 mL/Hr) IV Continuous <Continuous>    MEDICATIONS  (PRN):  acetaminophen    Suspension .. 650 milliGRAM(s) Enteral Tube every 6 hours PRN Temp greater or equal to 38.5C (101.3F)  dextrose 40% Gel 15 Gram(s) Oral once PRN Blood Glucose LESS THAN 70 milliGRAM(s)/deciliter  glucagon  Injectable 1 milliGRAM(s) IntraMuscular once PRN Glucose LESS THAN 70 milligrams/deciliter  midazolam Injectable 1 milliGRAM(s) IV Push every 2 hours PRN sedation      Allergies    No Known Allergies    Intolerances          Vital Signs Last 24 Hrs  T(C): 37.8 (2018 08:39), Max: 37.8 (2018 12:11)  T(F): 100 (2018 08:39), Max: 100 (2018 12:11)  HR: 66 (2018 08:00) (60 - 92)  BP: 113/65 (2018 08:00) (98/58 - 127/66)  BP(mean): 80 (2018 08:00) (70 - 84)  RR: 24 (2018 08:00) (24 - 36)  SpO2: 100% (2018 08:00) (99% - 100%)        I&O's Detail    2018 07:01  -  2018 07:00  --------------------------------------------------------  IN:    Enteral Tube Flush: 575 mL    Free Water: 100 mL    Glucerna: 920 mL    IV PiggyBack: 250 mL    sodium chloride 0.9%.: 1125 mL    Solution: 450 mL  Total IN: 3420 mL    OUT:    Indwelling Catheter - Suprapubic: 2100 mL  Total OUT: 2100 mL    Total NET: 1320 mL          LABS:                        10.2   9.69  )-----------( 190      ( 2018 06:56 )             30.8     2018 06:56    136    |  102    |  19     ----------------------------<  182    3.6     |  24     |  1.20     Ca    8.0        2018 06:56  Phos  2.8       2018 06:56    TPro  5.5    /  Alb  1.9    /  TBili  1.0    /  DBili  x      /  AST  183    /  ALT  69     /  AlkPhos  196    2018 06:56      Urinalysis Basic - ( 2018 08:09 )    Color: Red / Appearance: Turbid / S.010 / pH: x  Gluc: x / Ketone: Small  / Bili: Negative / Urobili: 4 mg/dL   Blood: x / Protein: 500 mg/dL / Nitrite: Negative   Leuk Esterase: Small / RBC: >50 /HPF / WBC 3-5   Sq Epi: x / Non Sq Epi: Few / Bacteria: Few      CAPILLARY BLOOD GLUCOSE      POCT Blood Glucose.: 183 mg/dL (2018 06:25)  POCT Blood Glucose.: 209 mg/dL (2018 23:11)  POCT Blood Glucose.: 237 mg/dL (2018 17:04)  POCT Blood Glucose.: 277 mg/dL (2018 12:30)        Cultures  Culture Results:   No growth ( @ 10:34)  Culture Results:   Moderate Klebsiella pneumoniae  Moderate Escherichia coli  Normal Respiratory Beatriz present ( @ 17:15)  Culture Results:   No growth to date. ( @ 01:50)  Culture Results:   No growth to date. ( @ 01:50)        Culture - Urine (collected 18 @ 10:34)  Source: .Urine Catheterized  Final Report (18 @ 11:24):    No growth    Culture - Sputum (collected 18 @ 17:15)  Source: .Sputum Sputum  Gram Stain (18 @ 21:35):    Moderate polymorphonuclear leukocytes per low power field    Rare Squamous epithelial cells per low power field    Moderate Gram positive cocci in pairs per oil power field    Few Gram Negative Rods per oil power field  Preliminary Report (18 @ 21:53):    Moderate Klebsiella pneumoniae    Moderate Escherichia coli    Normal Respiratory Beatriz present  Organism: Klebsiella pneumoniae (18 @ 18:33)  Organism: Klebsiella pneumoniae (18 @ 18:33)      -  Amikacin: S <=8      -  Amoxicillin/Clavulanic Acid: S <=8/4      -  Ampicillin: R >16 These ampicillin results predict results for amoxicillin      -  Ampicillin/Sulbactam: S 8/4      -  Aztreonam: S <=4      -  Cefazolin: S <=2      -  Cefepime: S <=2      -  Cefoxitin: S <=4      -  Ceftriaxone: S <=1 Enterobacter, Citrobacter, and Serratia may develop resistance during prolonged therapy      -  Ciprofloxacin: S <=0.5      -  Ertapenem: S <=0.5      -  Gentamicin: S <=1      -  Imipenem: S <=1      -  Levofloxacin: S <=1      -  Meropenem: S <=1      -  Piperacillin/Tazobactam: S <=8      -  Tobramycin: S <=2      -  Trimethoprim/Sulfamethoxazole: S <=0.5/9.5      Method Type: GLENDA    Culture - Blood (collected 18 @ 01:50)  Source: .Blood Blood  Preliminary Report (18 @ 02:02):    No growth to date.    Culture - Blood (collected 18 @ 01:50)  Source: .Blood Blood  Preliminary Report (18 @ 02:02):    No growth to date.        RADIOLOGY & ADDITIONAL TESTS:    Imaging Personally Reviewed:  [ ] YES  [ ] NO    Consultant(s) Notes Reviewed:  [ ] YES  [ ] NO    Care Discussed with Consultants/Other Providers [ ] YES  [ ] NO

## 2018-11-29 NOTE — PROGRESS NOTE ADULT - SUBJECTIVE AND OBJECTIVE BOX
Chart reviewed Detailed note to follow below Chart reviewed Detailed note to follow below               SHON HAMEED [facility] S [phone] [id number]   ALLERGY nka   ADMITTING MD Dr Chloe Abdalla  CONTACT Sheree P [phone] Son Jalen P  [phone]  REASON FOR VISIT    Subsequent followup (Cov Dr Vogel)  Initial evaluation/Pulmonary Critical Care consultation done by Dr Vogel   Patient examined chart reviewed  HOSPITAL ADMISSION [facility] 11/25/2018  PATIENT CAME  FROM (if information available)      PAST MEDICAL SURGICAL HISTORY      PAST MEDICAL HISTORY:  Dyslipidemia  Hypothyroidism  Parkinson's disease  Diabetes mellitus  CAD (coronary artery disease) of artery bypass graft  Benign essential hypertension  Adult hypothyroidism  Acquired hypothyroidism    IVF  NS 50 (11/27)     VITALS/LABS       11/29/2018 99f 65 112/68 24 100% 62   11/29/2018 u 1300   11/29/2018 W 9.6 Hb 10.2 Plt 190   11/29/2018 [phone]   11/28/2018 Na 133 K 3.6 CO2 23 Cr 1.2     REVIEW OF SYMPTOMS    Able to give ROS  NO     PHYSICAL EXAM    HEENT Unremarkable PERRLA atraumatic   RESP Fair air entry EXP prolonged    Harsh breath sound Resp distres mild   CARDIAC S1 S2 No S3     NO JVD    ABDOMEN SOFT BS PRESENT NOT DISTENDED No hepatosplenomegaly PEDAL EDEMA present No calf tenderness  NO rash   GENERAL Not TOXIC looking    EVENTS       11/29/2018 DVT PPX hsc started (11/29/2018)   11/29/2018 VENT Off vent ABG ordered (11/29/2018)     GLOBAL ISSUE/BEST PRACTICE:      PROBLEM: HOB elevation:   y            PROBLEM: Stress ulcer proph:    protonix 40 (11/27)           PROBLEM Infection ppx Chlorhexidien .12 (11/27)              PROBLEM: VTE prophylaxis:      hsc (11/29)   PROBLEM: Glycemic control:    lantus (11/28)   PROBLEM: Nutrition:    Glucerna 1.2 960 (11/26) (ng)   PROBLEM: Advanced directive: na     PROBLEM: Allergies:  na    PATIENT DESCRIPTION PATIENT HAMEEDLUIS ENRIQUE MARIANORADHA BURGER 11/25/2018 ADMISSION [facility] S  CHLOE ABDALLA MD           79 y/o M with PMH of DM, Dyslipidemia, CAD s/p CABG, PPM, Parkinson's Disease with Dementia, Hypothyroidism and BPH who presented with respiratory distress, acute hypoxic respiratory failure secondary to  aspiration pneumonia (parkinsons) with septic shock  Could have severe CAP too He was febrile and hypoxic , likely has severe sepsis with septic shock.     PATIENT DESCRIPTION PATIENT SHON HAMEED 11/25/2018 ADMISSION [facility] CHLOE LOZANO MD            PRESENTING PROBLEMS 11/25/2018   Ac hypoxic resp failure  Septic shock  Asp pneum  Urine retn    PMH      MAIN PROBLEMS   Shock, Septic 11/26 ECHO EF 46% dd1 Mod MR Mod AI pasp 27  Lacticemia 11/28 LA 3   Sepsis  Pneumonia Leg n (11/28) 11/26 Sp Klens E coli 11/26 bc n 11/29 CXR GONZALO pneumonia   SP Intubation 11/26-11/28  Respiratory failure on vent Extubated 11/28  AMEYA: Prerenal azotemia, ATN Nonoliguric (11/29/2018)   Hypokalemia  Hyponatremia  Hypophosphatemia 11/28 PO4 2  Urine retention sp Suprapubic 11/26 Dr Stark  Malnutrition 11/28 Alb 2.3   Elevated LFTS 11/28   ALT 30   Hypothyroid Levoxyl 125 (11/27)   PARKINSONS Sinemet CR 25 1001.5 x2 (11/27)          ASSESSMENT/RECOMMENDATIONS PATIENT SHON HAMEED 11/25/2018 ADMISSION [facility] CHLOE LOZANO MD           SHOCK Off venous vasopressors On IVF Good uo On midodrine 5.2 (11/26)   LACTICEMIA Followup serially   PNEUMONIA Azithro (11/27) (CCPA) zosyn (11/25)   11/29/2018 Leg neg Consider dc azithro To dw Dr Cage  SEDATION Midazolam 1.12p (11/27)  Dexmedetomidine (11/26) Weaned off 11/29   VENT 11/29/2018 Off vent Check ABG   CAD HO CABG On Plavix 75  (11/27)  (11/27) Atorvastat 40 (11/26)   AMEYA Nonoliguric resolving   ELEVATED LFTS Followup serially Hep panel    TIME SPENT Over 35 minutes aggregate critical care time spent on encounter; activities included   direct patient care, counseling and/or coordinating care reviewing notes, lab data/ imaging , discussion with multidisciplinary team/ patient  /family. Risks, benefits, alternatives  discussed in detail.

## 2018-11-29 NOTE — PROGRESS NOTE ADULT - ASSESSMENT
·	AMEYA: Prerenal azotemia, ATN  ·	Shock, Sepsis  ·	Respiratory failure on vent   ·	Hypokalemia  ·	Hyponatremia    Stable renal function. On tube feeds. To continue current meds. Potassium and phos supps. Wean off vent as tolerated.   IV abx. ID follow up. Avoid nephrotoxic meds as possible. Will follow electrolytes and renal function trend. D/w family at bedside.

## 2018-11-29 NOTE — PROGRESS NOTE ADULT - SUBJECTIVE AND OBJECTIVE BOX
Patient is a 78y old  Male who presents with a chief complaint of Respiratory distress. (2018 08:03)      Patient seen in follow up for AMEYA. Remains on vent support. Family at bedside.     PAST MEDICAL HISTORY:  Dyslipidemia  Hypothyroidism  Parkinson's disease  Diabetes mellitus  CAD (coronary artery disease) of artery bypass graft  Benign essential hypertension  Adult hypothyroidism  Acquired hypothyroidism    MEDICATIONS  (STANDING):  aspirin 325 milliGRAM(s) Enteral Tube daily  atorvastatin 40 milliGRAM(s) Oral at bedtime  carbidopa/levodopa CR 25/100 1.5 Tablet(s) Oral two times a day  chlorhexidine 0.12% Liquid 15 milliLiter(s) Swish and Spit two times a day  clopidogrel Tablet 75 milliGRAM(s) Oral daily  dexmedetomidine Infusion 0.3 MICROgram(s)/kG/Hr (4.522 mL/Hr) IV Continuous <Continuous>  dextrose 5%. 1000 milliLiter(s) (50 mL/Hr) IV Continuous <Continuous>  dextrose 50% Injectable 12.5 Gram(s) IV Push once  dextrose 50% Injectable 25 Gram(s) IV Push once  dextrose 50% Injectable 25 Gram(s) IV Push once  heparin  Injectable 5000 Unit(s) SubCutaneous every 12 hours  insulin glargine Injectable (LANTUS) 6 Unit(s) SubCutaneous at bedtime  insulin lispro (HumaLOG) corrective regimen sliding scale   SubCutaneous every 6 hours  levothyroxine 125 MICROGram(s) Enteral Tube daily  midodrine 5 milliGRAM(s) Oral three times a day  pantoprazole   Suspension 40 milliGRAM(s) Enteral Tube daily  piperacillin/tazobactam IVPB. 3.375 Gram(s) IV Intermittent every 8 hours  sodium chloride 0.9%. 1000 milliLiter(s) (50 mL/Hr) IV Continuous <Continuous>    MEDICATIONS  (PRN):  acetaminophen    Suspension .. 650 milliGRAM(s) Enteral Tube every 6 hours PRN Temp greater or equal to 38.5C (101.3F)  dextrose 40% Gel 15 Gram(s) Oral once PRN Blood Glucose LESS THAN 70 milliGRAM(s)/deciliter  glucagon  Injectable 1 milliGRAM(s) IntraMuscular once PRN Glucose LESS THAN 70 milligrams/deciliter  midazolam Injectable 1 milliGRAM(s) IV Push every 2 hours PRN sedation    T(C): 38.2 (18 @ 12:21), Max: 38.2 (18 @ 12:21)  HR: 63 (18 @ 15:19) (60 - 92)  BP: 97/64 (18 @ 14:00) (91/55 - 139/64)  RR: 28 (18 @ 14:00)  SpO2: 99% (18 @ 15:19)  Wt(kg): --  I&O's Detail    2018 07:01  -  2018 07:00  --------------------------------------------------------  IN:    Enteral Tube Flush: 575 mL    Free Water: 100 mL    Glucerna: 920 mL    IV PiggyBack: 250 mL    sodium chloride 0.9%.: 1125 mL    Solution: 450 mL  Total IN: 3420 mL    OUT:    Indwelling Catheter - Suprapubic: 2100 mL  Total OUT: 2100 mL    Total NET: 1320 mL      PHYSICAL EXAM:  General: on vent  Respiratory: b/l air entry  Cardiovascular: S1 S2  Gastrointestinal: soft, supra pubic catheter  Extremities:  no edema                    LABORATORY:                        10.2   9.69  )-----------( 190      ( 2018 06:56 )             30.8         136  |  102  |  19  ----------------------------<  182<H>  3.6   |  24  |  1.20    Ca    8.0<L>      2018 06:56  Phos  2.8         TPro  5.5<L>  /  Alb  1.9<L>  /  TBili  1.0  /  DBili  x   /  AST  183<H>  /  ALT  69<H>  /  AlkPhos  196<H>      Sodium, Serum: 136 mmol/L ( @ 06:56)  Sodium, Serum: 133 mmol/L ( @ 06:49)    Potassium, Serum: 3.6 mmol/L ( @ 06:56)  Potassium, Serum: 3.6 mmol/L ( @ 06:49)    Hemoglobin: 10.2 g/dL ( @ 06:56)  Hemoglobin: 9.1 g/dL ( @ 06:49)  Hemoglobin: 11.2 g/dL ( @ 06:54)    Creatinine, Serum 1.20 ( @ 06:56)  Creatinine, Serum 1.27 ( @ 06:49)  Creatinine, Serum 1.13 ( @ 06:54)  Creatinine, Serum 1.32 ( @ 21:49)        LIVER FUNCTIONS - ( 2018 06:56 )  Alb: 1.9 g/dL / Pro: 5.5 g/dL / ALK PHOS: 196 U/L / ALT: 69 U/L DA / AST: 183 U/L / GGT: x           Urinalysis Basic - ( 2018 08:09 )    Color: Red / Appearance: Turbid / S.010 / pH: x  Gluc: x / Ketone: Small  / Bili: Negative / Urobili: 4 mg/dL   Blood: x / Protein: 500 mg/dL / Nitrite: Negative   Leuk Esterase: Small / RBC: >50 /HPF / WBC 3-5   Sq Epi: x / Non Sq Epi: Few / Bacteria: Few      ABG - ( 2018 10:03 )  pH, Arterial: x     pH, Blood: 7.46  /  pCO2: 30    /  pO2: 159   / HCO3: 23    / Base Excess: -2.0  /  SaO2: 100

## 2018-11-29 NOTE — PROGRESS NOTE ADULT - ASSESSMENT
79 y/o M with PMH of DM, Dyslipidemia, CAD s/p CABG, PPM, Parkinson's Disease with Dementia, Hypothyroidism and BPH presented with respiratory distress.     1. Acute respiratory failure/Sepsis/PNA  -S/P intubation  Possibly secondary to CAP VS.  Aspiration PNA  -Continue with  current antibiotic as per ID  -RSV panel negative.  Blood culture NGTD.  -Further care as per pulmonary.  Trial of weaning started today.    2.  Acute renal failure With urinary retention.  Possibly due to ATN VS. Dehydration in the setting of infection.  -Suprapubic cath  was inserted as per urology  -Continue with IVF  -Avoid nephrotoxin  -nephrology to follow up      3. Coagulopathy. ML related to liver cell failure given the LFTs and current sepsis.    -Monitor LFT/PT/INR    4.  DM2  -Continue with ISS, and Monitor POC  -Hold oral medications    5. HX of CAD.  Continue with ASA, and Metoprolol    6.  Abnormal trop level  -Seems to be secondary to demand mismatch ischemia in the setting of sepsis, and infection  likely due to demand ischemia in setting of renal insufficiency and sepsis.    7.  Parkinson's disease.  Will resume home medications  -Patient has been on puree diet  -Son/Dr. Grider refuses Peg tube placement      DVT proph heparin    prognosis guarded to poor    8. Hypokalemia- resoved./ hypophosphatemia-   resolved.

## 2018-11-30 LAB
ALBUMIN SERPL ELPH-MCNC: 1.8 G/DL — LOW (ref 3.3–5)
ALP SERPL-CCNC: 204 U/L — HIGH (ref 30–120)
ALT FLD-CCNC: 20 U/L DA — SIGNIFICANT CHANGE UP (ref 10–60)
ANION GAP SERPL CALC-SCNC: 8 MMOL/L — SIGNIFICANT CHANGE UP (ref 5–17)
AST SERPL-CCNC: 105 U/L — HIGH (ref 10–40)
BASE EXCESS BLDA CALC-SCNC: -1 MMOL/L — SIGNIFICANT CHANGE UP (ref -2–2)
BILIRUB DIRECT SERPL-MCNC: 0.4 MG/DL — HIGH (ref 0–0.2)
BILIRUB INDIRECT FLD-MCNC: 0.2 MG/DL — SIGNIFICANT CHANGE UP (ref 0.2–1)
BILIRUB SERPL-MCNC: 0.6 MG/DL — SIGNIFICANT CHANGE UP (ref 0.2–1.2)
BLOOD GAS COMMENTS: SIGNIFICANT CHANGE UP
BLOOD GAS COMMENTS: SIGNIFICANT CHANGE UP
BLOOD GAS SOURCE: SIGNIFICANT CHANGE UP
BUN SERPL-MCNC: 17 MG/DL — SIGNIFICANT CHANGE UP (ref 7–23)
CALCIUM SERPL-MCNC: 8 MG/DL — LOW (ref 8.4–10.5)
CHLORIDE SERPL-SCNC: 101 MMOL/L — SIGNIFICANT CHANGE UP (ref 96–108)
CK SERPL-CCNC: 113 U/L — SIGNIFICANT CHANGE UP (ref 39–308)
CO2 SERPL-SCNC: 26 MMOL/L — SIGNIFICANT CHANGE UP (ref 22–31)
CREAT SERPL-MCNC: 1.1 MG/DL — SIGNIFICANT CHANGE UP (ref 0.5–1.3)
GLUCOSE SERPL-MCNC: 221 MG/DL — HIGH (ref 70–99)
HAV IGM SER-ACNC: SIGNIFICANT CHANGE UP
HAV IGM SER-ACNC: SIGNIFICANT CHANGE UP
HBV CORE IGM SER-ACNC: SIGNIFICANT CHANGE UP
HBV CORE IGM SER-ACNC: SIGNIFICANT CHANGE UP
HBV SURFACE AG SER-ACNC: SIGNIFICANT CHANGE UP
HBV SURFACE AG SER-ACNC: SIGNIFICANT CHANGE UP
HCO3 BLDA-SCNC: 24 MMOL/L — SIGNIFICANT CHANGE UP (ref 21–29)
HCT VFR BLD CALC: 30.7 % — LOW (ref 39–50)
HCV AB S/CO SERPL IA: 0.13 S/CO — SIGNIFICANT CHANGE UP
HCV AB S/CO SERPL IA: 0.14 S/CO — SIGNIFICANT CHANGE UP
HCV AB SERPL-IMP: SIGNIFICANT CHANGE UP
HCV AB SERPL-IMP: SIGNIFICANT CHANGE UP
HGB BLD-MCNC: 10.1 G/DL — LOW (ref 13–17)
HOROWITZ INDEX BLDA+IHG-RTO: 30 — SIGNIFICANT CHANGE UP
INR BLD: 1.4 RATIO — HIGH (ref 0.88–1.16)
LACTATE SERPL-SCNC: 2.2 MMOL/L — HIGH (ref 0.7–2)
MAGNESIUM SERPL-MCNC: 1.9 MG/DL — SIGNIFICANT CHANGE UP (ref 1.6–2.6)
MCHC RBC-ENTMCNC: 26.8 PG — LOW (ref 27–34)
MCHC RBC-ENTMCNC: 32.9 GM/DL — SIGNIFICANT CHANGE UP (ref 32–36)
MCV RBC AUTO: 81.4 FL — SIGNIFICANT CHANGE UP (ref 80–100)
NRBC # BLD: 0 /100 WBCS — SIGNIFICANT CHANGE UP (ref 0–0)
PCO2 BLDA: 33 MMHG — SIGNIFICANT CHANGE UP (ref 32–46)
PH BLD: 7.44 — SIGNIFICANT CHANGE UP (ref 7.35–7.45)
PHOSPHATE SERPL-MCNC: 3 MG/DL — SIGNIFICANT CHANGE UP (ref 2.5–4.5)
PLATELET # BLD AUTO: 202 K/UL — SIGNIFICANT CHANGE UP (ref 150–400)
PO2 BLDA: 107 MMHG — SIGNIFICANT CHANGE UP (ref 74–108)
POTASSIUM SERPL-MCNC: 3.2 MMOL/L — LOW (ref 3.5–5.3)
POTASSIUM SERPL-SCNC: 3.2 MMOL/L — LOW (ref 3.5–5.3)
PROT SERPL-MCNC: 5.6 G/DL — LOW (ref 6–8.3)
PROTHROM AB SERPL-ACNC: 15.4 SEC — HIGH (ref 10–12.9)
RBC # BLD: 3.77 M/UL — LOW (ref 4.2–5.8)
RBC # FLD: 14.3 % — SIGNIFICANT CHANGE UP (ref 10.3–14.5)
SAO2 % BLDA: 99 % — HIGH (ref 92–96)
SODIUM SERPL-SCNC: 135 MMOL/L — SIGNIFICANT CHANGE UP (ref 135–145)
WBC # BLD: 8.1 K/UL — SIGNIFICANT CHANGE UP (ref 3.8–10.5)
WBC # FLD AUTO: 8.1 K/UL — SIGNIFICANT CHANGE UP (ref 3.8–10.5)

## 2018-11-30 PROCEDURE — 99233 SBSQ HOSP IP/OBS HIGH 50: CPT

## 2018-11-30 PROCEDURE — 71045 X-RAY EXAM CHEST 1 VIEW: CPT | Mod: 26

## 2018-11-30 PROCEDURE — 76705 ECHO EXAM OF ABDOMEN: CPT | Mod: 26

## 2018-11-30 RX ORDER — ERTAPENEM SODIUM 1 G/1
INJECTION, POWDER, LYOPHILIZED, FOR SOLUTION INTRAMUSCULAR; INTRAVENOUS
Qty: 0 | Refills: 0 | Status: COMPLETED | OUTPATIENT
Start: 2018-11-30 | End: 2018-12-05

## 2018-11-30 RX ORDER — POTASSIUM CHLORIDE 20 MEQ
40 PACKET (EA) ORAL EVERY 6 HOURS
Qty: 0 | Refills: 0 | Status: DISCONTINUED | OUTPATIENT
Start: 2018-11-30 | End: 2018-11-30

## 2018-11-30 RX ORDER — ERTAPENEM SODIUM 1 G/1
1000 INJECTION, POWDER, LYOPHILIZED, FOR SOLUTION INTRAMUSCULAR; INTRAVENOUS EVERY 24 HOURS
Qty: 0 | Refills: 0 | Status: COMPLETED | OUTPATIENT
Start: 2018-12-01 | End: 2018-12-05

## 2018-11-30 RX ORDER — POTASSIUM CHLORIDE 20 MEQ
40 PACKET (EA) ORAL EVERY 6 HOURS
Qty: 0 | Refills: 0 | Status: COMPLETED | OUTPATIENT
Start: 2018-11-30 | End: 2018-11-30

## 2018-11-30 RX ORDER — ERTAPENEM SODIUM 1 G/1
1000 INJECTION, POWDER, LYOPHILIZED, FOR SOLUTION INTRAMUSCULAR; INTRAVENOUS ONCE
Qty: 0 | Refills: 0 | Status: COMPLETED | OUTPATIENT
Start: 2018-11-30 | End: 2018-11-30

## 2018-11-30 RX ADMIN — Medication 40 MILLIEQUIVALENT(S): at 08:51

## 2018-11-30 RX ADMIN — Medication 40 MILLIEQUIVALENT(S): at 17:37

## 2018-11-30 RX ADMIN — Medication 125 MICROGRAM(S): at 05:03

## 2018-11-30 RX ADMIN — Medication 2: at 23:00

## 2018-11-30 RX ADMIN — ATORVASTATIN CALCIUM 40 MILLIGRAM(S): 80 TABLET, FILM COATED ORAL at 21:20

## 2018-11-30 RX ADMIN — CARBIDOPA AND LEVODOPA 1 TABLET(S): 25; 100 TABLET ORAL at 17:38

## 2018-11-30 RX ADMIN — CHLORHEXIDINE GLUCONATE 15 MILLILITER(S): 213 SOLUTION TOPICAL at 17:41

## 2018-11-30 RX ADMIN — MIDODRINE HYDROCHLORIDE 5 MILLIGRAM(S): 2.5 TABLET ORAL at 21:20

## 2018-11-30 RX ADMIN — Medication 2: at 12:09

## 2018-11-30 RX ADMIN — MIDAZOLAM HYDROCHLORIDE 1 MILLIGRAM(S): 1 INJECTION, SOLUTION INTRAMUSCULAR; INTRAVENOUS at 05:41

## 2018-11-30 RX ADMIN — HEPARIN SODIUM 5000 UNIT(S): 5000 INJECTION INTRAVENOUS; SUBCUTANEOUS at 17:38

## 2018-11-30 RX ADMIN — MIDODRINE HYDROCHLORIDE 5 MILLIGRAM(S): 2.5 TABLET ORAL at 05:04

## 2018-11-30 RX ADMIN — ERTAPENEM SODIUM 120 MILLIGRAM(S): 1 INJECTION, POWDER, LYOPHILIZED, FOR SOLUTION INTRAMUSCULAR; INTRAVENOUS at 09:22

## 2018-11-30 RX ADMIN — INSULIN GLARGINE 6 UNIT(S): 100 INJECTION, SOLUTION SUBCUTANEOUS at 23:00

## 2018-11-30 RX ADMIN — SODIUM CHLORIDE 50 MILLILITER(S): 9 INJECTION INTRAMUSCULAR; INTRAVENOUS; SUBCUTANEOUS at 22:40

## 2018-11-30 RX ADMIN — MIDODRINE HYDROCHLORIDE 5 MILLIGRAM(S): 2.5 TABLET ORAL at 13:53

## 2018-11-30 RX ADMIN — Medication 4: at 05:50

## 2018-11-30 RX ADMIN — CHLORHEXIDINE GLUCONATE 15 MILLILITER(S): 213 SOLUTION TOPICAL at 05:02

## 2018-11-30 RX ADMIN — PIPERACILLIN AND TAZOBACTAM 25 GRAM(S): 4; .5 INJECTION, POWDER, LYOPHILIZED, FOR SOLUTION INTRAVENOUS at 05:04

## 2018-11-30 RX ADMIN — HEPARIN SODIUM 5000 UNIT(S): 5000 INJECTION INTRAVENOUS; SUBCUTANEOUS at 05:02

## 2018-11-30 RX ADMIN — Medication 650 MILLIGRAM(S): at 14:50

## 2018-11-30 RX ADMIN — Medication 650 MILLIGRAM(S): at 13:53

## 2018-11-30 RX ADMIN — CLOPIDOGREL BISULFATE 75 MILLIGRAM(S): 75 TABLET, FILM COATED ORAL at 13:53

## 2018-11-30 RX ADMIN — CARBIDOPA AND LEVODOPA 1.5 TABLET(S): 25; 100 TABLET ORAL at 05:03

## 2018-11-30 RX ADMIN — Medication 325 MILLIGRAM(S): at 13:53

## 2018-11-30 RX ADMIN — SODIUM CHLORIDE 50 MILLILITER(S): 9 INJECTION INTRAMUSCULAR; INTRAVENOUS; SUBCUTANEOUS at 02:35

## 2018-11-30 RX ADMIN — PANTOPRAZOLE SODIUM 40 MILLIGRAM(S): 20 TABLET, DELAYED RELEASE ORAL at 13:52

## 2018-11-30 NOTE — PROGRESS NOTE ADULT - SUBJECTIVE AND OBJECTIVE BOX
Subjective: opens eyes to deeply noxious stimuli.       MEDICATIONS  (STANDING):  aspirin 325 milliGRAM(s) Enteral Tube daily  atorvastatin 40 milliGRAM(s) Oral at bedtime  carbidopa/levodopa CR 25/100 1.5 Tablet(s) Oral two times a day  chlorhexidine 0.12% Liquid 15 milliLiter(s) Swish and Spit two times a day  clopidogrel Tablet 75 milliGRAM(s) Oral daily  dexmedetomidine Infusion 0.3 MICROgram(s)/kG/Hr (4.522 mL/Hr) IV Continuous <Continuous>  dextrose 5%. 1000 milliLiter(s) (50 mL/Hr) IV Continuous <Continuous>  dextrose 50% Injectable 12.5 Gram(s) IV Push once  dextrose 50% Injectable 25 Gram(s) IV Push once  dextrose 50% Injectable 25 Gram(s) IV Push once  heparin  Injectable 5000 Unit(s) SubCutaneous every 12 hours  insulin glargine Injectable (LANTUS) 6 Unit(s) SubCutaneous at bedtime  insulin lispro (HumaLOG) corrective regimen sliding scale   SubCutaneous every 6 hours  levothyroxine 125 MICROGram(s) Enteral Tube daily  midodrine 5 milliGRAM(s) Oral three times a day  pantoprazole   Suspension 40 milliGRAM(s) Enteral Tube daily  piperacillin/tazobactam IVPB. 3.375 Gram(s) IV Intermittent every 8 hours  potassium chloride   Solution 40 milliEquivalent(s) Oral every 6 hours  sodium chloride 0.9%. 1000 milliLiter(s) (50 mL/Hr) IV Continuous <Continuous>    MEDICATIONS  (PRN):  acetaminophen    Suspension .. 650 milliGRAM(s) Enteral Tube every 6 hours PRN Temp greater or equal to 38.5C (101.3F)  dextrose 40% Gel 15 Gram(s) Oral once PRN Blood Glucose LESS THAN 70 milliGRAM(s)/deciliter  glucagon  Injectable 1 milliGRAM(s) IntraMuscular once PRN Glucose LESS THAN 70 milligrams/deciliter  midazolam Injectable 1 milliGRAM(s) IV Push every 2 hours PRN sedation          T(C): 37.7 (11-30-18 @ 04:15), Max: 38.2 (11-29-18 @ 12:21)  HR: 73 (11-30-18 @ 07:40) (60 - 76)  BP: 113/61 (11-30-18 @ 07:00) (97/64 - 154/74)  RR: 22 (11-30-18 @ 07:00) (18 - 31)  SpO2: 98% (11-30-18 @ 07:40) (98% - 100%)  Wt(kg): --    ABG - ( 30 Nov 2018 06:17 )  pH, Arterial: x     pH, Blood: 7.44  /  pCO2: 33    /  pO2: 107   / HCO3: 24    / Base Excess: -1.0  /  SaO2: 99                  I&O's Detail    29 Nov 2018 07:01  -  30 Nov 2018 07:00  --------------------------------------------------------  IN:    Enteral Tube Flush: 600 mL    Glucerna: 1300 mL    IV PiggyBack: 200 mL    Oral Fluid: 100 mL    sodium chloride 0.9%.: 1200 mL  Total IN: 3400 mL    OUT:    Indwelling Catheter - Suprapubic: 1100 mL  Total OUT: 1100 mL    Total NET: 2300 mL          Mode: CPAP with PS  FiO2: 30  PEEP: 5  PS: 10  ITime: 1  MAP: 10  PIP: 10       PHYSICAL EXAM:    GENERAL: vented/FiO2 30%  EYES: EOMI, PERRLA, conjunctiva and sclera clear  NECK: Supple, no inc in JVP  CHEST/LUNG: Clear  HEART: S1S2  ABDOMEN: Soft, Nontender, Nondistended; Bowel sounds present  EXTREMITIES:  no pedal edema  NEURO: opens eyes to deeply noxious stimuli      LABS:  CBC Full  -  ( 30 Nov 2018 06:57 )  WBC Count : 8.10 K/uL  Hemoglobin : 10.1 g/dL  Hematocrit : 30.7 %  Platelet Count - Automated : 202 K/uL  Mean Cell Volume : 81.4 fl  Mean Cell Hemoglobin : 26.8 pg  Mean Cell Hemoglobin Concentration : 32.9 gm/dL  Auto Neutrophil # : x  Auto Lymphocyte # : x  Auto Monocyte # : x  Auto Eosinophil # : x  Auto Basophil # : x  Auto Neutrophil % : x  Auto Lymphocyte % : x  Auto Monocyte % : x  Auto Eosinophil % : x  Auto Basophil % : x    11-30    135  |  101  |  17  ----------------------------<  221<H>  3.2<L>   |  26  |  1.10    Ca    8.0<L>      30 Nov 2018 06:57  Phos  3.0     11-30  Mg     1.9     11-30    TPro  5.6<L>  /  Alb  1.8<L>  /  TBili  0.6  /  DBili  0.4<H>  /  AST  105<H>  /  ALT  20  /  AlkPhos  204<H>  11-30    PT/INR - ( 30 Nov 2018 06:57 )   PT: 15.4 sec;   INR: 1.40 ratio             Culture Results:   No growth (11-28 @ 10:34)      Impression:  * AMEYA -- pre-renal azotemia +/- septic ATN. Resolving  * S/p septic shock  * Lactic acidosis, resolving  * HypoK  * Aspiration PNA, VDRF    Recommendations:   * KCL 40meq via NGT q6h X2  * D/c IVFs onces TFs are restarted.

## 2018-11-30 NOTE — CONSULT NOTE ADULT - ASSESSMENT
78m w/ parkinson dz a/w AMEYA and pneumonia   lft elevation- suspect related to underlying pna- reactive hepatopathy    RUQ sono ordered to be done at the bedside     send viral hep panel      f/u CMP       avoid hepatotoxic medications.   Dysphagia- continue w/ enteral feeding via ngt for time being   Anemia w/o overt gi bleeding. cont w/ acid suppression, PPI     remains on antiplatelet medications. monitor stools/hct.
79 y/o M with PMH of DM, Dyslipidemia, CAD s/p CABG, PPM, Parkinson's Disease with Dementia, Hypothyroidism and BPH presented with respiratory distress.   Acute respiratory failure due to pneumonia, ?early ARDS.  Sepsis due to pneumonia.  Parkinsonism with dementia  DM with hi glu
·	AMEYA: Prerenal azotemia, ATN  ·	Shock, Sepsis  ·	Respiratory failure on vent   ·	Hypokalemia  ·	Hyponatremia    Continue IVF. Will change to half NS as sodium was corrrected quickly. Will change to NS if BP remains low. Pressor support as needed.   Potassium supps. Check cultures, IV abx. ID follow up. Avoid nephrotoxic meds as possible. Will follow electrolytes and renal function trend.   D/w family at bedside. Further recommendations pending clinical course. Thank you for the courtesy of this referral.
78 year old male with acute hypoxic respiratory failure and sepsis secondary to pneumonia     Critical Care time: 40 mins assessing presenting problems of acute illness that poses high probability of life threatening deterioration or end organ damage/dysfunction.  Medical decision making inculding Initiating plan of care, reviewing data, reviewing radiology,direct patient bedside evaluation and interpretation of vital signs, any necessary ventilator management , discusion with multidisciplinary team, discussing goals of care with patient/family, all non inclusive of procedures

## 2018-11-30 NOTE — CONSULT NOTE ADULT - CONSULT REASON
lft elevation
Asked to see patient for elevated troponin
Hyponatremia, AMEYA
SOB
Sepsis  Acute respiratory failure  Pneumonia  Diabetes
Sepsis. Inability to pass [place].
Severe sepsis secondary to pneumonia

## 2018-11-30 NOTE — PROGRESS NOTE ADULT - MENTAL STATUS
unable to examine due to underlying medical condition.
unable to examine due to AMS.
unable due to AMS.
unable to examined due to underlying medica condition.

## 2018-11-30 NOTE — PROGRESS NOTE ADULT - ASSESSMENT
79 y/o M with PMH of DM, Dyslipidemia, CAD s/p CABG, PPM, Parkinson's Disease with Dementia, Hypothyroidism and BPH presented with respiratory distress.     1. Acute respiratory failure/Sepsis/PNA  -S/P intubation  Possibly secondary to CAP VS.  Aspiration PNA  -Continue with  current antibiotic as per ID  -RSV panel negative.  Blood culture NGTD.  -Further care as per pulmonary.  Trial of weaning started .    2.  Acute renal failure With urinary retention.  Possibly due to ATN VS. Dehydration in the setting of infection.  -Suprapubic cath  was inserted as per urology  -Continue with IVF  -Avoid nephrotoxin  -nephrology to follow up      3. Coagulopathy. ML related to liver cell failure given the LFTs and current sepsis.    -Monitor LFT/PT/INR    4.  DM2  -Continue with ISS, and Monitor POC  -Hold oral medications    5. HX of CAD.  Continue with ASA, and Metoprolol    6.  Abnormal trop level  -Seems to be secondary to demand mismatch ischemia in the setting of sepsis, and infection  likely due to demand ischemia in setting of renal insufficiency and sepsis.    7.  Parkinson's disease.  Will resume home medications  -Patient has been on puree diet  -Son/Dr. Grider and spouse refuses Peg tube placement    8. sputum + klebsilla nad ESBL e coli-   - antibiotic change to INVANZ.  9. Elevated LFTs- likly due to pneumonia-  f/u melisa chandler.  GI .    DVT proph heparin    prognosis guarded to poor    8. Hypokalemia- resoved./ hypophosphatemia-   resolved.    Prognosis is poor.  Plan of care was discuss with patient, all questions were answered, seems understand and in agreement.  Goals of care and advance directive discuss with spouse.  may consider DNR once he is extubated.

## 2018-11-30 NOTE — PROGRESS NOTE ADULT - SUBJECTIVE AND OBJECTIVE BOX
Patient is a 78y old  Male who presents with a chief complaint of Respiratory distress. (30 Nov 2018 10:21)      INTERVAL HPI/OVERNIGHT EVENTS:  unable to obtain due underlying medical condition.    Pain Location & Control:     MEDICATIONS  (STANDING):  aspirin 325 milliGRAM(s) Enteral Tube daily  atorvastatin 40 milliGRAM(s) Oral at bedtime  carbidopa/levodopa CR 25/100 1.5 Tablet(s) Oral two times a day  chlorhexidine 0.12% Liquid 15 milliLiter(s) Swish and Spit two times a day  clopidogrel Tablet 75 milliGRAM(s) Oral daily  dexmedetomidine Infusion 0.3 MICROgram(s)/kG/Hr (4.522 mL/Hr) IV Continuous <Continuous>  dextrose 5%. 1000 milliLiter(s) (50 mL/Hr) IV Continuous <Continuous>  dextrose 50% Injectable 12.5 Gram(s) IV Push once  dextrose 50% Injectable 25 Gram(s) IV Push once  dextrose 50% Injectable 25 Gram(s) IV Push once  ertapenem  IVPB      heparin  Injectable 5000 Unit(s) SubCutaneous every 12 hours  insulin glargine Injectable (LANTUS) 6 Unit(s) SubCutaneous at bedtime  insulin lispro (HumaLOG) corrective regimen sliding scale   SubCutaneous every 6 hours  levothyroxine 125 MICROGram(s) Enteral Tube daily  midodrine 5 milliGRAM(s) Oral three times a day  pantoprazole   Suspension 40 milliGRAM(s) Enteral Tube daily  potassium chloride   Powder 40 milliEquivalent(s) Oral every 6 hours  sodium chloride 0.9%. 1000 milliLiter(s) (50 mL/Hr) IV Continuous <Continuous>    MEDICATIONS  (PRN):  acetaminophen    Suspension .. 650 milliGRAM(s) Enteral Tube every 6 hours PRN Temp greater or equal to 38.5C (101.3F)  dextrose 40% Gel 15 Gram(s) Oral once PRN Blood Glucose LESS THAN 70 milliGRAM(s)/deciliter  glucagon  Injectable 1 milliGRAM(s) IntraMuscular once PRN Glucose LESS THAN 70 milligrams/deciliter  midazolam Injectable 1 milliGRAM(s) IV Push every 2 hours PRN sedation      Allergies    No Known Allergies    Intolerances          Vital Signs Last 24 Hrs  T(C): 37.6 (30 Nov 2018 08:16), Max: 38.2 (29 Nov 2018 12:21)  T(F): 99.7 (30 Nov 2018 08:16), Max: 100.8 (29 Nov 2018 12:21)  HR: 63 (30 Nov 2018 11:00) (60 - 76)  BP: 140/72 (30 Nov 2018 11:00) (97/64 - 159/82)  BP(mean): 89 (30 Nov 2018 11:00) (74 - 102)  RR: 25 (30 Nov 2018 11:00) (17 - 31)  SpO2: 100% (30 Nov 2018 11:00) (98% - 100%)        I&O's Detail    29 Nov 2018 07:01  -  30 Nov 2018 07:00  --------------------------------------------------------  IN:    Enteral Tube Flush: 600 mL    Glucerna: 1300 mL    IV PiggyBack: 200 mL    Oral Fluid: 100 mL    sodium chloride 0.9%.: 1200 mL  Total IN: 3400 mL    OUT:    Indwelling Catheter - Suprapubic: 1100 mL  Total OUT: 1100 mL    Total NET: 2300 mL      30 Nov 2018 07:01  -  30 Nov 2018 11:14  --------------------------------------------------------  IN:    Enteral Tube Flush: 60 mL    sodium chloride 0.9%.: 100 mL  Total IN: 160 mL    OUT:    Indwelling Catheter - Suprapubic: 325 mL  Total OUT: 325 mL    Total NET: -165 mL          LABS:                        10.1   8.10  )-----------( 202      ( 30 Nov 2018 06:57 )             30.7     30 Nov 2018 06:57    135    |  101    |  17     ----------------------------<  221    3.2     |  26     |  1.10     Ca    8.0        30 Nov 2018 06:57  Phos  3.0       30 Nov 2018 06:57  Mg     1.9       30 Nov 2018 06:57    TPro  5.6    /  Alb  1.8    /  TBili  0.6    /  DBili  0.4    /  AST  105    /  ALT  20     /  AlkPhos  204    30 Nov 2018 06:57    PT/INR - ( 30 Nov 2018 06:57 )   PT: 15.4 sec;   INR: 1.40 ratio             CAPILLARY BLOOD GLUCOSE      POCT Blood Glucose.: 216 mg/dL (30 Nov 2018 05:43)  POCT Blood Glucose.: 157 mg/dL (29 Nov 2018 23:02)  POCT Blood Glucose.: 181 mg/dL (29 Nov 2018 17:41)  POCT Blood Glucose.: 173 mg/dL (29 Nov 2018 12:31)    CARDIAC MARKERS ( 30 Nov 2018 06:57 )  x     / x     / 113 U/L / x     / x          Cultures  Culture Results:   Moderate Klebsiella pneumoniae  Moderate Escherichia coli  Normal Respiratory Beatriz absent (11-29 @ 11:18)  Culture Results:   No growth (11-28 @ 10:34)  Culture Results:   Moderate Klebsiella pneumoniae  Moderate Escherichia coli ESBL  Normal Respiratory Beatriz present (11-26 @ 17:15)  Culture Results:   No growth to date. (11-26 @ 01:50)  Culture Results:   No growth to date. (11-26 @ 01:50)    Lactate, Blood: 2.2 mmol/L (11-30 @ 06:57)  Lactate, Blood: 2.5 mmol/L (11-29 @ 12:35)      Culture - Sputum (collected 11-29-18 @ 11:18)  Source: .Sputum Sputum/TRAP  Gram Stain (11-29-18 @ 13:43):    No polymorphonuclear leukocytes per low power field    No Squamous epithelial cells per low power field    No organisms seen per oil power field  Preliminary Report (11-30-18 @ 09:39):    Moderate Klebsiella pneumoniae    Moderate Escherichia coli    Normal Respiratory Beatriz absent    Culture - Urine (collected 11-28-18 @ 10:34)  Source: .Urine Catheterized  Final Report (11-29-18 @ 11:24):    No growth    Culture - Sputum (collected 11-26-18 @ 17:15)  Source: .Sputum Sputum  Gram Stain (11-26-18 @ 21:35):    Moderate polymorphonuclear leukocytes per low power field    Rare Squamous epithelial cells per low power field    Moderate Gram positive cocci in pairs per oil power field    Few Gram Negative Rods per oil power field  Final Report (11-29-18 @ 18:18):    Moderate Klebsiella pneumoniae    Moderate Escherichia coli ESBL    Normal Respiratory Beatriz present  Organism: Klebsiella pneumoniae  Escherichia coli ESBL (11-29-18 @ 18:18)  Organism: Escherichia coli ESBL (11-29-18 @ 18:18)      -  Amikacin: S <=8      -  Amoxicillin/Clavulanic Acid: S <=8/4      -  Ampicillin: R >16 These ampicillin results predict results for amoxicillin      -  Ampicillin/Sulbactam: R <=4/2      -  Aztreonam: R >16      -  Cefazolin: R >16      -  Cefepime: R >16      -  Cefoxitin: S <=4      -  Ceftriaxone: R >32 Enterobacter, Citrobacter, and Serratia may develop resistance during prolonged therapy      -  Ciprofloxacin: R >2      -  Ertapenem: S <=0.5      -  Gentamicin: S 2      -  Imipenem: S <=1      -  Levofloxacin: R >4      -  Meropenem: S <=1      -  Piperacillin/Tazobactam: R <=8      -  Tobramycin: S <=2      -  Trimethoprim/Sulfamethoxazole: R >2/38      Method Type: GLENDA  Organism: Klebsiella pneumoniae (11-29-18 @ 18:18)      -  Amikacin: S <=8      -  Amoxicillin/Clavulanic Acid: S <=8/4      -  Ampicillin: R >16 These ampicillin results predict results for amoxicillin      -  Ampicillin/Sulbactam: S 8/4      -  Aztreonam: S <=4      -  Cefazolin: S <=2      -  Cefepime: S <=2      -  Cefoxitin: S <=4      -  Ceftriaxone: S <=1 Enterobacter, Citrobacter, and Serratia may develop resistance during prolonged therapy      -  Ciprofloxacin: S <=0.5      -  Ertapenem: S <=0.5      -  Gentamicin: S <=1      -  Imipenem: S <=1      -  Levofloxacin: S <=1      -  Meropenem: S <=1      -  Piperacillin/Tazobactam: S <=8      -  Tobramycin: S <=2      -  Trimethoprim/Sulfamethoxazole: S <=0.5/9.5      Method Type: GLENDA    Culture - Blood (collected 11-26-18 @ 01:50)  Source: .Blood Blood  Preliminary Report (11-27-18 @ 02:02):    No growth to date.    Culture - Blood (collected 11-26-18 @ 01:50)  Source: .Blood Blood  Preliminary Report (11-27-18 @ 02:02):    No growth to date.        RADIOLOGY & ADDITIONAL TESTS:    Imaging Personally Reviewed:  [ ] YES  [ ] NO    Consultant(s) Notes Reviewed:  [ x] YES  [ ] NO    Care Discussed with Consultants/Other Providers [x ] YES  [ ] NO

## 2018-11-30 NOTE — PROGRESS NOTE ADULT - SUBJECTIVE AND OBJECTIVE BOX
ID Progress note     Name: SHON HAMEED  Age: 78y  Gender: Male  MRN: [id number]    Interval History-- Events noted, was restless overnight so got Versed, now lethargic , was on CPAP all day yesterday . Afebrile   Notes reviewed    Past Medical History--  Type 2 diabetes mellitus  Dyslipidemia  Hypothyroidism  Parkinson's disease  Diabetes mellitus  CAD (coronary artery disease) of artery bypass graft  Benign essential hypertension  Adult hypothyroidism  Acquired hypothyroidism  S/P CABG (coronary artery bypass graft)  Pacemaker      For details regarding the patient's social history, family history, and other miscellaneous elements, please refer the initial infectious diseases consultation and/or the admitting history and physical examination for this admission.    Allergies--  Allergies    No Known Allergies    Intolerances        Medications--  Antibiotics:  piperacillin/tazobactam IVPB. 3.375 Gram(s) IV Intermittent every 8 hours    Immunologic:    Other:  acetaminophen    Suspension .. PRN  aspirin  atorvastatin  carbidopa/levodopa CR 25/100  chlorhexidine 0.12% Liquid  clopidogrel Tablet  dexmedetomidine Infusion  dextrose 40% Gel PRN  dextrose 5%.  dextrose 50% Injectable  dextrose 50% Injectable  dextrose 50% Injectable  glucagon  Injectable PRN  heparin  Injectable  insulin glargine Injectable (LANTUS)  insulin lispro (HumaLOG) corrective regimen sliding scale  levothyroxine  midazolam Injectable PRN  midodrine  pantoprazole   Suspension  potassium chloride   Powder  sodium chloride 0.9%.      Review of Systems--  Review of systems unable to be obtained secondary to clinical condition.    Physical Examination--    Vital Signs: T(F): 99.9 (11-30-18 @ 04:15), Max: 100.8 (11-29-18 @ 12:21)  HR: 73 (11-30-18 @ 07:40)  BP: 113/61 (11-30-18 @ 07:00)  RR: 22 (11-30-18 @ 07:00)  SpO2: 98% (11-30-18 @ 07:40)  Wt(kg): --  General: Nontoxic-appearing Male intubated , opens eyes on name calling, moving his toes   HEENT: AT/NC. PERRL. EOMI. Anicteric. Conjunctiva pink and moist. Oropharynx clear. Dentition fair.  Neck: Not rigid. No sense of mass.  Nodes: None palpable.  Lungs: Coarse crackles  bilaterally without rales, wheezing or rhonchi  Heart: Regular rate and rhythm. No Murmur. No rub. No gallop. No palpable thrill.  Abdomen: Bowel sounds present and normoactive. Soft. Nondistended. Nontender. No sense of mass. No organomegaly.  Back: No spinal tenderness. No costovertebral angle tenderness.   Extremities: No cyanosis or clubbing. No edema.   Skin: Warm. Dry. Good turgor. No rash. No vasculitic stigmata.  Psychiatric: Appropriate affect and mood for situation.         Laboratory Studies--  CBC                        10.1   8.10  )-----------( 202      ( 30 Nov 2018 06:57 )             30.7       Chemistries  11-30    135  |  101  |  17  ----------------------------<  221<H>  3.2<L>   |  26  |  1.10    Ca    8.0<L>      30 Nov 2018 06:57  Phos  3.0     11-30  Mg     1.9     11-30    TPro  5.6<L>  /  Alb  1.8<L>  /  TBili  0.6  /  DBili  0.4<H>  /  AST  105<H>  /  ALT  20  /  AlkPhos  204<H>  11-30    CAPILLARY BLOOD GLUCOSE      POCT Blood Glucose.: 216 mg/dL (30 Nov 2018 05:43)  POCT Blood Glucose.: 157 mg/dL (29 Nov 2018 23:02)  POCT Blood Glucose.: 181 mg/dL (29 Nov 2018 17:41)  POCT Blood Glucose.: 173 mg/dL (29 Nov 2018 12:31)    Culture Data    Culture - Sputum (collected 29 Nov 2018 11:18)  Source: .Sputum Sputum/TRAP  Gram Stain (29 Nov 2018 13:43):    No polymorphonuclear leukocytes per low power field    No Squamous epithelial cells per low power field    No organisms seen per oil power field    Culture - Urine (collected 28 Nov 2018 10:34)  Source: .Urine Catheterized  Final Report (29 Nov 2018 11:24):    No growth    Culture - Sputum (collected 26 Nov 2018 17:15)  Source: .Sputum Sputum  Gram Stain (26 Nov 2018 21:35):    Moderate polymorphonuclear leukocytes per low power field    Rare Squamous epithelial cells per low power field    Moderate Gram positive cocci in pairs per oil power field    Few Gram Negative Rods per oil power field  Final Report (29 Nov 2018 18:18):    Moderate Klebsiella pneumoniae    Moderate Escherichia coli ESBL    Normal Respiratory Beatriz present  Organism: Klebsiella pneumoniae  Escherichia coli ESBL (29 Nov 2018 18:18)  Organism: Escherichia coli ESBL (29 Nov 2018 18:18)  Organism: Klebsiella pneumoniae (29 Nov 2018 18:18)    Culture - Blood (collected 26 Nov 2018 01:50)  Source: .Blood Blood  Preliminary Report (27 Nov 2018 02:02):    No growth to date.    Culture - Blood (collected 26 Nov 2018 01:50)  Source: .Blood Blood  Preliminary Report (27 Nov 2018 02:02):    No growth to date.        Radiology:  Xray Chest 1 View- PORTABLE-Routine (11.30.18 @ 06:17) >  INTERPRETATION:  AP chest on November 30, 2018 at 6:10 AM. Patient is   being followed for pneumonia and is intubated.    Heart is magnified by technique. Sternotomy and left-sided pacemaker   again noted.    Endotracheal tube and nasogastric tube remain.    Right apical thickening again noted.    Significant infiltrate in the right lower lung field is relatively stable   compared to November 29.    Minimal scattered infiltrates left hilum are again seen as well.    Overall chest is unchanged.    IMPRESSION: Unchanged infiltrates as above.      Assessment--  77 y/o M with PMH of DM, Dyslipidemia, CAD s/p CABG, PPM, Parkinson's Disease with Dementia, Hypothyroidism and BPH who presented with respiratory distress.  Has acute hypoxic respiratory failure secondary to  pneumonia likely aspiration as he has parkinson's diseases. Could have severe CAP too He was febrile and hypoxic , likely has severe sepsis with septic shock. He has been not on any pressors . Based on his presentation he will likely have positive blood cs   He likely has Gram negative pneumonia secondary to aspiration . Doubt its legionella., his urine legionella antigen is negative     he has poorly controlled DM    Sputum cs shows Klebsiella and ESBL E coli     Plan:  - will change to IV Invanz 1 gram daily as he has ESBL E coli  in sputum   - weaning as per pulmonary   - may need PEG , family likely to refuse PEG  - serial CXR  - trend lactate   - overall prognosis poor    Continue with present regime .  Appropriate use of antibiotics and adverse effects reviewed.    I have discussed the above plan of care with patient's family in detail. They expressed understanding of the treatment plan . Risks, benefits and alternatives discussed in detail. I have asked if they have any questions or concerns and appropriately addressed them to the best of my ability .      > 35 minutes spent in direct patient care reviewing  the notes, lab data/ imaging , discussion with multidisciplinary team. All questions were addressed and answered to the best of my capacity .    Thank you for allowing me to participate in the care of your patient .        Stu Cage MD  [phone]

## 2018-11-30 NOTE — PROGRESS NOTE ADULT - SUBJECTIVE AND OBJECTIVE BOX
11/30/2018 am Patient was examined Chart was reviewed Detailed pulmonary note will be inserted below after more data has been gathered and processed Meanwhile please refer to my previous note for recommendations 11/30/2018 am Patient was examined Chart was reviewed Detailed pulmonary note will be inserted below after more data has been gathered and processed Meanwhile please refer to my previous note for recommendations       HAMEEDSHON [facility] S [phone] [id number]   ALLERGY nka   ADMITTING MD Dr Chloe Abdalla  CONTACT Sheree P [phone] Son Jalen P  [phone]  REASON FOR VISIT    Subsequent followup (Cov Dr Vogel)  Initial evaluation/Pulmonary Critical Care consultation done by Dr Vogel   Patient examined chart reviewed  HOSPITAL ADMISSION [facility] 11/25/2018  PATIENT CAME  FROM (if information available)      IVF  NS 50 (11/27)     VITALS/LABS      11/30/2018 100 60 117/63   11/30/2018 CPAP 5/10/.3   11/30/2018 6a 744/33/107   11/30/2018 W 8.1 Hb 10.1 Plt 202 INR 1.4 Na 135 K 3.2 CO2 26 Cr 1.1   11/30/2018 LA 2.2     EVENTS       11/30/2018 Remains vent dep Fam to call me re whetherto reintubated after extubation or not if they change decvision At preseent the son who is hospitalist told me yesterday am that pt is to be reintubated if needed   11/29/2018 DVT PPX hsc started (11/29/2018)     REVIEW OF SYMPTOMS    Able to give ROS  NO     PHYSICAL EXAM    HEENT Unremarkable PERRLA atraumatic   RESP Fair air entry EXP prolonged    Harsh breath sound Resp distres mild   CARDIAC S1 S2 No S3     NO JVD    ABDOMEN SOFT BS PRESENT NOT DISTENDED No hepatosplenomegaly PEDAL EDEMA present No calf tenderness  NO rash   GENERAL Not TOXIC looking    GLOBAL ISSUE/BEST PRACTICE:      PROBLEM: HOB elevation:   y            PROBLEM: Stress ulcer proph:    protonix 40 (11/27)           PROBLEM Infection ppx Chlorhexidien .12 (11/27)              PROBLEM: VTE prophylaxis:      hsc (11/29)   PROBLEM: Glycemic control:    lantus (11/28)   PROBLEM: Nutrition:    Glucerna 1.2 960 (11/26) (ng)   PROBLEM: Advanced directive: na     PROBLEM: Allergies:  na  PROCEDURE [place] 11/30/2018     PATIENT DESCRIPTION PATIENT SHON HAMEED 11/25/2018 ADMISSION [facility]  CHLOE ABDALLA MD      73 m       PRESENTING PROBLEMS 11/25/2018   Ac hypoxic resp failure  Septic shock  Asp pneum  Urine retn    PMH  DM, Dyslipidemia, CAD s/p CABG, PPM, Parkinson    MAIN PROBLEMS   Shock, Septic 11/26 ECHO EF 46% dd1 Mod MR Mod AI pasp 27  Lacticemia 11/28 LA 3   Sepsis  Pneumonia Leg n (11/28) 11/26 Sp Klens E coli 11/26 bc n 11/29 CXR GONZALO pneumonia   SP Intubation 11/26  Respiratory failure on vent  AMEYA: Prerenal azotemia, ATN Nonoliguric (11/29/2018)   Hypokalemia  Hyponatremia  Hypophosphatemia 11/28 PO4 2  Urine retention sp Suprapubic 11/26 Dr Stark  Malnutrition 11/28 Alb 2.3   Elevated LFTS 11/28-11/30 -204 -105 ALT 30 -20  11/30/2018 US abd mild gbw edema unlikely jong as per radiologist   Hypothyroid Levoxyl 125 (11/27)   PARKINSONS Sinemet CR 25 1001.5 x2 (11/27)          ASSESSMENT/RECOMMENDATIONS PATIENT SHON HAMEED 11/25/2018 ADMISSION [facility] CHLOE LOZANO MD           SHOCK Off venous vasopressors On IVF Good uo On midodrine 5.2 (11/26)   LACTICEMIA Followup serially   PNEUMONIA Azithro (11/27) (CCPA) zosyn (11/25) ch Ertapenem (11/30) (MDR gnb pn)   11/29/2018 Leg neg Consider dc azithro To dw Dr Cage  SEDATION Midazolam 1.12p (11/27)  Dexmedetomidine (11/26)   VENT   CAD HO CABG On Plavix 75  (11/27)  (11/27) Atorvastat 40 (11/26)   AMEYA Nonoliguric resolving   ELEVATED LFTS Followup serially Hep panel    TIME SPENT Over 35 minutes aggregate critical care time spent on encounter; activities included   direct patient care, counseling and/or coordinating care reviewing notes, lab data/ imaging , discussion with multidisciplinary team/ patient  /family. Risks, benefits, alternatives  discussed in detail.

## 2018-11-30 NOTE — PROGRESS NOTE ADULT - GASTROINTESTINAL DETAILS
bowel sounds normal/soft/no distention
bowel sounds normal/no distention/soft
bowel sounds normal/no distention/soft
bowel sounds normal/soft

## 2018-11-30 NOTE — CONSULT NOTE ADULT - SUBJECTIVE AND OBJECTIVE BOX
Chief Complaint:  Patient is a 78y old  Male who presents with a chief complaint of Respiratory distress. (2018 08:31)    Type 2 diabetes mellitus  Dyslipidemia  Hypothyroidism  Parkinson's disease  Diabetes mellitus  CAD (coronary artery disease) of artery bypass graft  Benign essential hypertension  Adult hypothyroidism  Acquired hypothyroidism  S/P CABG (coronary artery bypass graft)  Pacemaker     HPI:  This is an 79 y/o M with PMH of DM, Dyslipidemia, CAD s/p CABG, PPM, Parkinson's Disease with Dementia, Hypothyroidism and BPH who presented with respiratory distress. As per patient's wife at the bedside he was at his baseline condition till 4 days ago when she noticed that he closes his mouth & doesn't allow any thing to be put in his mouth, and she has to use a dripper to feed him. Yesterday she noticed that he is not responding, and today he was breathing fast with audible wheezing, so she brought him to the hospital, no cough, fever, or chills at home, also denies vomiting, no BM over the past 4 days. Wife stated also that patient "lost his voice" a month ago because of his parkinson's disease, but was able to understand and interact non verbally, till yesterday. (2018 23:07)  evaluation requested re lft abnml. family at bedside denies previous liver dz         No Known Allergies      acetaminophen    Suspension .. 650 milliGRAM(s) Enteral Tube every 6 hours PRN  aspirin 325 milliGRAM(s) Enteral Tube daily  atorvastatin 40 milliGRAM(s) Oral at bedtime  carbidopa/levodopa CR 25/100 1.5 Tablet(s) Oral two times a day  chlorhexidine 0.12% Liquid 15 milliLiter(s) Swish and Spit two times a day  clopidogrel Tablet 75 milliGRAM(s) Oral daily  dexmedetomidine Infusion 0.3 MICROgram(s)/kG/Hr IV Continuous <Continuous>  dextrose 40% Gel 15 Gram(s) Oral once PRN  dextrose 5%. 1000 milliLiter(s) IV Continuous <Continuous>  dextrose 50% Injectable 12.5 Gram(s) IV Push once  dextrose 50% Injectable 25 Gram(s) IV Push once  dextrose 50% Injectable 25 Gram(s) IV Push once  ertapenem  IVPB      glucagon  Injectable 1 milliGRAM(s) IntraMuscular once PRN  heparin  Injectable 5000 Unit(s) SubCutaneous every 12 hours  insulin glargine Injectable (LANTUS) 6 Unit(s) SubCutaneous at bedtime  insulin lispro (HumaLOG) corrective regimen sliding scale   SubCutaneous every 6 hours  levothyroxine 125 MICROGram(s) Enteral Tube daily  midazolam Injectable 1 milliGRAM(s) IV Push every 2 hours PRN  midodrine 5 milliGRAM(s) Oral three times a day  pantoprazole   Suspension 40 milliGRAM(s) Enteral Tube daily  potassium chloride   Powder 40 milliEquivalent(s) Oral every 6 hours  sodium chloride 0.9%. 1000 milliLiter(s) IV Continuous <Continuous>        FAMILY HISTORY:  Family history of prostate cancer (Sibling)        Review of Systems:  unable to interview      Physical Exam:    Vital Signs:  Vital Signs Last 24 Hrs  T(C): 37.7 (2018 04:15), Max: 38.2 (2018 12:21)  T(F): 99.9 (2018 04:15), Max: 100.8 (2018 12:21)  HR: 60 (2018 09:00) (60 - 76)  BP: 128/59 (2018 09:00) (97/64 - 154/74)  BP(mean): 81 (2018 09:00) (74 - 97)  RR: 25 (2018 09:00) (18 - 31)  SpO2: 100% (2018 09:00) (98% - 100%)  Daily     Daily Weight in k.4 (2018 05:00)    General:  Appears stated age, well-groomed, well-nourished, no distress  HEENT:  NC/AT,  conjunctivae clear and pink, no thyromegaly, nodules, adenopathy, no JVD  Chest:  Full & symmetric excursion, no increased effort, breath sounds clear  Cardiovascular:  Regular rhythm, S1, S2, no murmur/rub/S3/S4, no abdominal bruit, no edema  Abdomen:  Soft, non-tender, non-distended, normoactive bowel sounds,  no masses ,no hepatosplenomeagaly, no signs of chronic liver disease  Extremities:  no cyanosis,clubbing or edema  Skin:  No rash/erythema/ecchymoses/petechiae/wounds/abscess/warm/dry  Neuro/Psych:  Alert, oriented, no asterixis, no tremor, no encephalopathy    Laboratory:                            10.1   8.10  )-----------( 202      ( 2018 06:57 )             30.7     -    135  |  101  |  17  ----------------------------<  221<H>  3.2<L>   |  26  |  1.10    Ca    8.0<L>      2018 06:57  Phos  3.0       Mg     1.9         TPro  5.6<L>  /  Alb  1.8<L>  /  TBili  0.6  /  DBili  0.4<H>  /  AST  105<H>  /  ALT  20  /  AlkPhos  204<H>  30    LIVER FUNCTIONS - ( 2018 06:57 )  Alb: 1.8 g/dL / Pro: 5.6 g/dL / ALK PHOS: 204 U/L / ALT: 20 U/L DA / AST: 105 U/L / GGT: x           PT/INR - ( 2018 06:57 )   PT: 15.4 sec;   INR: 1.40 ratio               Imaging:      Assessment:      Plan:

## 2018-11-30 NOTE — CONSULT NOTE ADULT - REASON FOR ADMISSION
Pneumonia
Respiratory distress.

## 2018-11-30 NOTE — PROGRESS NOTE ADULT - RESPIRATORY
Breath Sounds equal & clear to percussion & auscultation, no accessory muscle use
detailed exam
detailed exam
Breath Sounds equal & clear to percussion & auscultation, no accessory muscle use

## 2018-12-01 LAB
-  AMIKACIN: SIGNIFICANT CHANGE UP
-  AMIKACIN: SIGNIFICANT CHANGE UP
-  AMOXICILLIN/CLAVULANIC ACID: SIGNIFICANT CHANGE UP
-  AMOXICILLIN/CLAVULANIC ACID: SIGNIFICANT CHANGE UP
-  AMPICILLIN/SULBACTAM: SIGNIFICANT CHANGE UP
-  AMPICILLIN/SULBACTAM: SIGNIFICANT CHANGE UP
-  AMPICILLIN: SIGNIFICANT CHANGE UP
-  AMPICILLIN: SIGNIFICANT CHANGE UP
-  AZTREONAM: SIGNIFICANT CHANGE UP
-  AZTREONAM: SIGNIFICANT CHANGE UP
-  CEFAZOLIN: SIGNIFICANT CHANGE UP
-  CEFAZOLIN: SIGNIFICANT CHANGE UP
-  CEFEPIME: SIGNIFICANT CHANGE UP
-  CEFEPIME: SIGNIFICANT CHANGE UP
-  CEFOXITIN: SIGNIFICANT CHANGE UP
-  CEFOXITIN: SIGNIFICANT CHANGE UP
-  CEFTRIAXONE: SIGNIFICANT CHANGE UP
-  CEFTRIAXONE: SIGNIFICANT CHANGE UP
-  CIPROFLOXACIN: SIGNIFICANT CHANGE UP
-  CIPROFLOXACIN: SIGNIFICANT CHANGE UP
-  ERTAPENEM: SIGNIFICANT CHANGE UP
-  ERTAPENEM: SIGNIFICANT CHANGE UP
-  GENTAMICIN: SIGNIFICANT CHANGE UP
-  GENTAMICIN: SIGNIFICANT CHANGE UP
-  IMIPENEM: SIGNIFICANT CHANGE UP
-  IMIPENEM: SIGNIFICANT CHANGE UP
-  LEVOFLOXACIN: SIGNIFICANT CHANGE UP
-  LEVOFLOXACIN: SIGNIFICANT CHANGE UP
-  MEROPENEM: SIGNIFICANT CHANGE UP
-  MEROPENEM: SIGNIFICANT CHANGE UP
-  PIPERACILLIN/TAZOBACTAM: SIGNIFICANT CHANGE UP
-  PIPERACILLIN/TAZOBACTAM: SIGNIFICANT CHANGE UP
-  TOBRAMYCIN: SIGNIFICANT CHANGE UP
-  TOBRAMYCIN: SIGNIFICANT CHANGE UP
-  TRIMETHOPRIM/SULFAMETHOXAZOLE: SIGNIFICANT CHANGE UP
-  TRIMETHOPRIM/SULFAMETHOXAZOLE: SIGNIFICANT CHANGE UP
ALBUMIN SERPL ELPH-MCNC: 1.7 G/DL — LOW (ref 3.3–5)
ALP SERPL-CCNC: 186 U/L — HIGH (ref 30–120)
ALT FLD-CCNC: 15 U/L DA — SIGNIFICANT CHANGE UP (ref 10–60)
ANION GAP SERPL CALC-SCNC: 7 MMOL/L — SIGNIFICANT CHANGE UP (ref 5–17)
AST SERPL-CCNC: 73 U/L — HIGH (ref 10–40)
BASE EXCESS BLDA CALC-SCNC: -0.3 MMOL/L — SIGNIFICANT CHANGE UP (ref -2–2)
BASE EXCESS BLDA CALC-SCNC: 0.5 MMOL/L — SIGNIFICANT CHANGE UP (ref -2–2)
BILIRUB DIRECT SERPL-MCNC: 0.3 MG/DL — HIGH (ref 0–0.2)
BILIRUB INDIRECT FLD-MCNC: 0.2 MG/DL — SIGNIFICANT CHANGE UP (ref 0.2–1)
BILIRUB SERPL-MCNC: 0.5 MG/DL — SIGNIFICANT CHANGE UP (ref 0.2–1.2)
BLOOD GAS SOURCE: SIGNIFICANT CHANGE UP
BLOOD GAS SOURCE: SIGNIFICANT CHANGE UP
BUN SERPL-MCNC: 15 MG/DL — SIGNIFICANT CHANGE UP (ref 7–23)
CALCIUM SERPL-MCNC: 7.8 MG/DL — LOW (ref 8.4–10.5)
CHLORIDE SERPL-SCNC: 104 MMOL/L — SIGNIFICANT CHANGE UP (ref 96–108)
CO2 SERPL-SCNC: 26 MMOL/L — SIGNIFICANT CHANGE UP (ref 22–31)
CREAT SERPL-MCNC: 0.97 MG/DL — SIGNIFICANT CHANGE UP (ref 0.5–1.3)
CULTURE RESULTS: SIGNIFICANT CHANGE UP
GLUCOSE SERPL-MCNC: 188 MG/DL — HIGH (ref 70–99)
HCO3 BLDA-SCNC: 24 MMOL/L — SIGNIFICANT CHANGE UP (ref 21–29)
HCO3 BLDA-SCNC: 25 MMOL/L — SIGNIFICANT CHANGE UP (ref 21–29)
HCT VFR BLD CALC: 28.5 % — LOW (ref 39–50)
HGB BLD-MCNC: 9.5 G/DL — LOW (ref 13–17)
HOROWITZ INDEX BLDA+IHG-RTO: 30 — SIGNIFICANT CHANGE UP
HOROWITZ INDEX BLDA+IHG-RTO: 35 — SIGNIFICANT CHANGE UP
INR BLD: 1.44 RATIO — HIGH (ref 0.88–1.16)
MAGNESIUM SERPL-MCNC: 1.9 MG/DL — SIGNIFICANT CHANGE UP (ref 1.6–2.6)
MCHC RBC-ENTMCNC: 27.8 PG — SIGNIFICANT CHANGE UP (ref 27–34)
MCHC RBC-ENTMCNC: 33.3 GM/DL — SIGNIFICANT CHANGE UP (ref 32–36)
MCV RBC AUTO: 83.3 FL — SIGNIFICANT CHANGE UP (ref 80–100)
METHOD TYPE: SIGNIFICANT CHANGE UP
METHOD TYPE: SIGNIFICANT CHANGE UP
NRBC # BLD: 0 /100 WBCS — SIGNIFICANT CHANGE UP (ref 0–0)
ORGANISM # SPEC MICROSCOPIC CNT: SIGNIFICANT CHANGE UP
PCO2 BLDA: 34 MMHG — SIGNIFICANT CHANGE UP (ref 32–46)
PCO2 BLDA: 35 MMHG — SIGNIFICANT CHANGE UP (ref 32–46)
PH BLD: 7.44 — SIGNIFICANT CHANGE UP (ref 7.35–7.45)
PH BLD: 7.46 — HIGH (ref 7.35–7.45)
PHOSPHATE SERPL-MCNC: 3.4 MG/DL — SIGNIFICANT CHANGE UP (ref 2.5–4.5)
PLATELET # BLD AUTO: 256 K/UL — SIGNIFICANT CHANGE UP (ref 150–400)
PO2 BLDA: 115 MMHG — HIGH (ref 74–108)
PO2 BLDA: 95 MMHG — SIGNIFICANT CHANGE UP (ref 74–108)
POTASSIUM SERPL-MCNC: 3.9 MMOL/L — SIGNIFICANT CHANGE UP (ref 3.5–5.3)
POTASSIUM SERPL-SCNC: 3.9 MMOL/L — SIGNIFICANT CHANGE UP (ref 3.5–5.3)
PROCALCITONIN SERPL-MCNC: 4.05 NG/ML — HIGH (ref 0.02–0.1)
PROT SERPL-MCNC: 4.7 G/DL — LOW (ref 6–8.3)
PROTHROM AB SERPL-ACNC: 15.9 SEC — HIGH (ref 10–12.9)
RBC # BLD: 3.42 M/UL — LOW (ref 4.2–5.8)
RBC # FLD: 14.6 % — HIGH (ref 10.3–14.5)
SAO2 % BLDA: 98 % — HIGH (ref 92–96)
SAO2 % BLDA: 99 % — HIGH (ref 92–96)
SODIUM SERPL-SCNC: 137 MMOL/L — SIGNIFICANT CHANGE UP (ref 135–145)
SPECIMEN SOURCE: SIGNIFICANT CHANGE UP
WBC # BLD: 8.83 K/UL — SIGNIFICANT CHANGE UP (ref 3.8–10.5)
WBC # FLD AUTO: 8.83 K/UL — SIGNIFICANT CHANGE UP (ref 3.8–10.5)

## 2018-12-01 PROCEDURE — 99233 SBSQ HOSP IP/OBS HIGH 50: CPT

## 2018-12-01 PROCEDURE — 71045 X-RAY EXAM CHEST 1 VIEW: CPT | Mod: 26

## 2018-12-01 RX ORDER — IPRATROPIUM/ALBUTEROL SULFATE 18-103MCG
3 AEROSOL WITH ADAPTER (GRAM) INHALATION EVERY 6 HOURS
Qty: 0 | Refills: 0 | Status: DISCONTINUED | OUTPATIENT
Start: 2018-12-01 | End: 2018-12-07

## 2018-12-01 RX ORDER — POTASSIUM CHLORIDE 20 MEQ
10 PACKET (EA) ORAL ONCE
Qty: 0 | Refills: 0 | Status: DISCONTINUED | OUTPATIENT
Start: 2018-12-01 | End: 2018-12-01

## 2018-12-01 RX ORDER — POTASSIUM CHLORIDE 20 MEQ
40 PACKET (EA) ORAL EVERY 4 HOURS
Qty: 0 | Refills: 0 | Status: DISCONTINUED | OUTPATIENT
Start: 2018-12-01 | End: 2018-12-01

## 2018-12-01 RX ADMIN — HEPARIN SODIUM 5000 UNIT(S): 5000 INJECTION INTRAVENOUS; SUBCUTANEOUS at 17:25

## 2018-12-01 RX ADMIN — MIDODRINE HYDROCHLORIDE 5 MILLIGRAM(S): 2.5 TABLET ORAL at 05:11

## 2018-12-01 RX ADMIN — Medication 3 MILLILITER(S): at 13:42

## 2018-12-01 RX ADMIN — SODIUM CHLORIDE 50 MILLILITER(S): 9 INJECTION INTRAMUSCULAR; INTRAVENOUS; SUBCUTANEOUS at 18:34

## 2018-12-01 RX ADMIN — Medication 650 MILLIGRAM(S): at 04:16

## 2018-12-01 RX ADMIN — Medication 325 MILLIGRAM(S): at 12:51

## 2018-12-01 RX ADMIN — CARBIDOPA AND LEVODOPA 1.5 TABLET(S): 25; 100 TABLET ORAL at 05:11

## 2018-12-01 RX ADMIN — HEPARIN SODIUM 5000 UNIT(S): 5000 INJECTION INTRAVENOUS; SUBCUTANEOUS at 05:11

## 2018-12-01 RX ADMIN — ATORVASTATIN CALCIUM 40 MILLIGRAM(S): 80 TABLET, FILM COATED ORAL at 22:09

## 2018-12-01 RX ADMIN — PANTOPRAZOLE SODIUM 40 MILLIGRAM(S): 20 TABLET, DELAYED RELEASE ORAL at 12:51

## 2018-12-01 RX ADMIN — Medication 125 MICROGRAM(S): at 05:11

## 2018-12-01 RX ADMIN — MIDODRINE HYDROCHLORIDE 5 MILLIGRAM(S): 2.5 TABLET ORAL at 22:09

## 2018-12-01 RX ADMIN — CLOPIDOGREL BISULFATE 75 MILLIGRAM(S): 75 TABLET, FILM COATED ORAL at 12:51

## 2018-12-01 RX ADMIN — Medication 2: at 17:25

## 2018-12-01 RX ADMIN — Medication 2: at 06:29

## 2018-12-01 RX ADMIN — CARBIDOPA AND LEVODOPA 1.5 TABLET(S): 25; 100 TABLET ORAL at 17:25

## 2018-12-01 RX ADMIN — Medication 3 MILLILITER(S): at 19:13

## 2018-12-01 RX ADMIN — INSULIN GLARGINE 6 UNIT(S): 100 INJECTION, SOLUTION SUBCUTANEOUS at 23:49

## 2018-12-01 RX ADMIN — ERTAPENEM SODIUM 120 MILLIGRAM(S): 1 INJECTION, POWDER, LYOPHILIZED, FOR SOLUTION INTRAMUSCULAR; INTRAVENOUS at 08:01

## 2018-12-01 RX ADMIN — CHLORHEXIDINE GLUCONATE 15 MILLILITER(S): 213 SOLUTION TOPICAL at 05:11

## 2018-12-01 NOTE — PROGRESS NOTE ADULT - ASSESSMENT
79 y/o M with PMH of DM, Dyslipidemia, CAD s/p CABG, PPM, Parkinson's Disease with Dementia, Hypothyroidism and BPH presented with respiratory distress.     1. Acute respiratory failure/Sepsis/PNA  -S/P intubation - now extubated  Possibly secondary to CAP VS.  Aspiration PNA  -Continue with  current antibiotic as per ID  -RSV panel negative.  Blood culture NGTD.  -Further care as per pulmonary.    2.  Acute renal failure With urinary retention.  Possibly due to ATN VS. Dehydration in the setting of infection.  -Suprapubic cath  was inserted as per urology  -Continue with IVF  -Avoid nephrotoxin  -nephrology to follow up      3. Coagulopathy. improving    4.  DM2  -Continue with ISS, and Monitor POC  -Hold oral medications    5. HX of CAD.  Continue with ASA, and Metoprolol    6.  Abnormal trop level  -Seems to be secondary to demand mismatch ischemia in the setting of sepsis, and infection  likely due to demand ischemia in setting of renal insufficiency and sepsis.    7.  Parkinson's disease.  Will resume home medications  -Patient has been on puree diet  -Son/Dr. Grider and spouse refuses Peg tube placement  swallow eval    8. sputum + klebsilla and ESBL e coli-   - antibiotic change to INVANZ.    9. Elevated LFTs- resolving    10. DVT proph heparin    Prognosis is poor.  quality of life is limited  Goals of care and advance directive discussed with son.  d/w RN  spent 40 mins

## 2018-12-01 NOTE — PROGRESS NOTE ADULT - SUBJECTIVE AND OBJECTIVE BOX
Patient is a 78y old  Male who presents with a chief complaint of Respiratory distress. (01 Dec 2018 10:03)      INTERVAL History of Present Illness/OVERNIGHT EVENTS: extubated; doing ok.  son at bedside.    MEDICATIONS  (STANDING):  ALBUTerol/ipratropium for Nebulization 3 milliLiter(s) Nebulizer every 6 hours  aspirin 325 milliGRAM(s) Enteral Tube daily  atorvastatin 40 milliGRAM(s) Oral at bedtime  carbidopa/levodopa CR 25/100 1.5 Tablet(s) Oral two times a day  chlorhexidine 0.12% Liquid 15 milliLiter(s) Swish and Spit two times a day  clopidogrel Tablet 75 milliGRAM(s) Oral daily  dexmedetomidine Infusion 0.3 MICROgram(s)/kG/Hr (4.522 mL/Hr) IV Continuous <Continuous>  dextrose 5%. 1000 milliLiter(s) (50 mL/Hr) IV Continuous <Continuous>  dextrose 50% Injectable 12.5 Gram(s) IV Push once  dextrose 50% Injectable 25 Gram(s) IV Push once  dextrose 50% Injectable 25 Gram(s) IV Push once  ertapenem  IVPB      ertapenem  IVPB 1000 milliGRAM(s) IV Intermittent every 24 hours  heparin  Injectable 5000 Unit(s) SubCutaneous every 12 hours  insulin glargine Injectable (LANTUS) 6 Unit(s) SubCutaneous at bedtime  insulin lispro (HumaLOG) corrective regimen sliding scale   SubCutaneous every 6 hours  levothyroxine 125 MICROGram(s) Enteral Tube daily  midodrine 5 milliGRAM(s) Oral three times a day  pantoprazole   Suspension 40 milliGRAM(s) Enteral Tube daily  sodium chloride 0.9%. 1000 milliLiter(s) (50 mL/Hr) IV Continuous <Continuous>    MEDICATIONS  (PRN):  acetaminophen    Suspension .. 650 milliGRAM(s) Enteral Tube every 6 hours PRN Temp greater or equal to 38.5C (101.3F)  dextrose 40% Gel 15 Gram(s) Oral once PRN Blood Glucose LESS THAN 70 milliGRAM(s)/deciliter  glucagon  Injectable 1 milliGRAM(s) IntraMuscular once PRN Glucose LESS THAN 70 milligrams/deciliter  midazolam Injectable 1 milliGRAM(s) IV Push every 2 hours PRN sedation      Allergies    No Known Allergies    Intolerances        REVIEW OF SYSTEMS:  Negative unless otherwise specified above.    Vital Signs Last 24 Hrs  T(C): 37 (01 Dec 2018 12:43), Max: 38.2 (01 Dec 2018 04:00)  T(F): 98.6 (01 Dec 2018 12:43), Max: 100.8 (01 Dec 2018 05:00)  HR: 60 (01 Dec 2018 14:00) (60 - 77)  BP: 164/77 (01 Dec 2018 14:00) (104/60 - 164/77)  BP(mean): 103 (01 Dec 2018 14:00) (74 - 103)  RR: 23 (01 Dec 2018 14:00) (17 - 33)  SpO2: 100% (01 Dec 2018 14:00) (98% - 100%)        PHYSICAL EXAM:  GENERAL: No apparent distress on face mask oxygen  HEAD:  Atraumatic, Normocephalic  EYES: conjunctiva and sclera clear  ENMT: Moist mucous membranes  NECK: Supple  CHEST/LUNG: bilateral rhnchi  HEART: Regular rate and rhythm  ABDOMEN: Soft, Nontender, Nondistended; Bowel sounds present  EXTREMITIES:  No clubbing, cyanosis or edema  SKIN: Ecchymoses  NERVOUS SYSTEM: nON-FOCAL  Mask like facies from Parkinson's  Aphasic      LABS:                        9.5    8.83  )-----------( 256      ( 01 Dec 2018 06:31 )             28.5     01 Dec 2018 06:31    137    |  104    |  15     ----------------------------<  188    3.9     |  26     |  0.97     Ca    7.8        01 Dec 2018 06:31  Phos  3.4       01 Dec 2018 06:31  Mg     1.9       01 Dec 2018 06:31    TPro  4.7    /  Alb  1.7    /  TBili  0.5    /  DBili  0.3    /  AST  73     /  ALT  15     /  AlkPhos  186    01 Dec 2018 06:31    PT/INR - ( 01 Dec 2018 06:31 )   PT: 15.9 sec;   INR: 1.44 ratio             CAPILLARY BLOOD GLUCOSE      POCT Blood Glucose.: 135 mg/dL (01 Dec 2018 12:49)  POCT Blood Glucose.: 173 mg/dL (01 Dec 2018 05:57)  POCT Blood Glucose.: 169 mg/dL (30 Nov 2018 22:58)  POCT Blood Glucose.: 102 mg/dL (30 Nov 2018 16:49)      RADIOLOGY & ADDITIONAL TESTS:    Images reviewed personally    Consultant Notes Reviewed and Care Discussed with relevant Consultants/Other Providers.

## 2018-12-01 NOTE — PROGRESS NOTE ADULT - SUBJECTIVE AND OBJECTIVE BOX
ID Progress note     Name: SHON HAMEED  Age: 78y  Gender: Male  MRN: [id number]    Interval History-- Events noted, has been on CPAP x 24 hours, doing well, more awake , does not follow commands , afebrile   Notes reviewed    Past Medical History--  Type 2 diabetes mellitus  Dyslipidemia  Hypothyroidism  Parkinson's disease  Diabetes mellitus  CAD (coronary artery disease) of artery bypass graft  Benign essential hypertension  Adult hypothyroidism  Acquired hypothyroidism  S/P CABG (coronary artery bypass graft)  Pacemaker      For details regarding the patient's social history, family history, and other miscellaneous elements, please refer the initial infectious diseases consultation and/or the admitting history and physical examination for this admission.    Allergies--  Allergies    No Known Allergies    Intolerances        Medications--  Antibiotics:  ertapenem  IVPB 1000 milliGRAM(s) IV Intermittent every 24 hours  ertapenem  IVPB        Immunologic:    Other:  acetaminophen    Suspension .. PRN  aspirin  atorvastatin  carbidopa/levodopa CR 25/100  chlorhexidine 0.12% Liquid  clopidogrel Tablet  dexmedetomidine Infusion  dextrose 40% Gel PRN  dextrose 5%.  dextrose 50% Injectable  dextrose 50% Injectable  dextrose 50% Injectable  glucagon  Injectable PRN  heparin  Injectable  insulin glargine Injectable (LANTUS)  insulin lispro (HumaLOG) corrective regimen sliding scale  levothyroxine  midazolam Injectable PRN  midodrine  pantoprazole   Suspension  sodium chloride 0.9%.      Review of Systems--  Review of systems unable to be obtained secondary to clinical condition.    Physical Examination--    Vital Signs: T(F): 99.8 (12-01-18 @ 06:00), Max: 101.4 (11-30-18 @ 14:00)  HR: 60 (12-01-18 @ 07:00)  BP: 128/68 (12-01-18 @ 06:00)  RR: 20 (12-01-18 @ 07:00)  SpO2: 100% (12-01-18 @ 07:00)  Wt(kg): --  General: Nontoxic-appearing Male in no acute distress.  HEENT: AT/NC. PERRL. EOMI. Anicteric. Conjunctiva pink and moist. Oropharynx clear. Dentition fair., NGT+   Neck: Not rigid. No sense of mass.  Nodes: None palpable.  Lungs: Clear bilaterally without rales, wheezing or rhonchi  Heart: Regular rate and rhythm. No Murmur. No rub. No gallop. No palpable thrill.  Abdomen: Bowel sounds present and normoactive. Soft. Nondistended. Nontender. No sense of mass. No organomegaly.  Back: No spinal tenderness. No costovertebral angle tenderness.   Extremities: No cyanosis or clubbing. No edema.   Skin: Warm. Dry. Good turgor. No rash. No vasculitic stigmata.  Psychiatric: Appropriate affect and mood for situation.         Laboratory Studies--  CBC                        9.5    8.83  )-----------( 256      ( 01 Dec 2018 06:31 )             28.5       Chemistries  12-01    137  |  104  |  15  ----------------------------<  188<H>  3.9   |  26  |  0.97    Ca    7.8<L>      01 Dec 2018 06:31  Phos  3.0     11-30  Mg     1.9     11-30    TPro  4.7<L>  /  Alb  1.7<L>  /  TBili  0.5  /  DBili  x   /  AST  73<H>  /  ALT  15  /  AlkPhos  186<H>  12-01    CAPILLARY BLOOD GLUCOSE      POCT Blood Glucose.: 173 mg/dL (01 Dec 2018 05:57)  POCT Blood Glucose.: 169 mg/dL (30 Nov 2018 22:58)  POCT Blood Glucose.: 102 mg/dL (30 Nov 2018 16:49)  POCT Blood Glucose.: 154 mg/dL (30 Nov 2018 11:58)    Recent Cultures:    Culture - Sputum (collected 29 Nov 2018 11:18)  Source: .Sputum Sputum/TRAP  Gram Stain (29 Nov 2018 13:43):    No polymorphonuclear leukocytes per low power field    No Squamous epithelial cells per low power field    No organisms seen per oil power field  Preliminary Report (30 Nov 2018 09:39):    Moderate Klebsiella pneumoniae    Moderate Escherichia coli    Normal Respiratory Beatriz absent    Culture - Urine (collected 28 Nov 2018 10:34)  Source: .Urine Catheterized  Final Report (29 Nov 2018 11:24):    No growth        Radiology:  US Abdomen Limited (11.30.18 @ 14:06) >  Right upper quadrant ultrasound.    No gallstones noted. There is mild edematous gallbladder wall thickening   nonspecific finding in and of itself may be related to patient's fluid or   protein status or adjacent hepatocellular disease/infection. Primary   acute cholecystitis considered less likely. However, if this is of   clinical concern, HIDA scan can be obtained. No biliary dilatation.   Common duct 0.4 cm. Liver normal size homogeneous echotexture. Pancreas   not remarkable. The right kidney 10.1 cm unremarkable. No ascites.  Incidental right pleural effusion.    Impression:    Nonspecific gallbladder wall edema. See discussion above. Limited   evaluation pancreas. Otherwise unremarkable.    Xray Chest 1 View- PORTABLE-Routine (11.30.18 @ 06:17) >  INTERPRETATION:  AP chest on November 30, 2018 at 6:10 AM. Patient is   being followed for pneumonia and is intubated.    Heart is magnified by technique. Sternotomy and left-sided pacemaker   again noted.    Endotracheal tube and nasogastric tube remain.    Right apical thickening again noted.    Significant infiltrate in the right lower lung field is relatively stable   compared to November 29.    Minimal scattered infiltrates left hilum are again seen as well.    Overall chest is unchanged.    IMPRESSION: Unchanged infiltrates as above.    Assessment--    77 y/o M with PMH of DM, Dyslipidemia, CAD s/p CABG, PPM, Parkinson's Disease with Dementia, Hypothyroidism and BPH who presented with respiratory distress.  Has acute hypoxic respiratory failure secondary to  pneumonia likely aspiration as he has parkinson's diseases. Could have severe CAP too He was febrile and hypoxic , likely has severe sepsis with septic shock. He has been not on any pressors . Based on his presentation he will likely have positive blood cs   He likely has Gram negative pneumonia secondary to aspiration . Doubt its legionella., his urine legionella antigen is negative     he has poorly controlled DM    Sputum cs shows Klebsiella and ESBL E coli     Plan:  - will continue with  IV Invanz 1 gram daily as he has ESBL E coli  in sputum   - weaning as per pulmonary possible extubation today , family may consider DNI   - may need PEG , family likely to refuse PEG but want to continue with NGT feeds till seen by speech therapy   - serial CXR  - overall prognosis poor    Continue with present regime .  Appropriate use of antibiotics and adverse effects reviewed.    I have discussed the above plan of care with patient's family in detail. They expressed understanding of the treatment plan . Risks, benefits and alternatives discussed in detail. I have asked if they have any questions or concerns and appropriately addressed them to the best of my ability .      > 35 minutes spent in direct patient care reviewing  the notes, lab data/ imaging , discussion with multidisciplinary team. All questions were addressed and answered to the best of my capacity .    Thank you for allowing me to participate in the care of your patient .        Stu Cage MD  [phone]

## 2018-12-01 NOTE — PROGRESS NOTE ADULT - SUBJECTIVE AND OBJECTIVE BOX
SHON HAMEED [facility] S [phone]   ALLERGY nka   ADMITTING MD Dr Chloe Abdalla  CONTACT Sheree P [phone] Son Jalen P  [phone]  REASON FOR VISIT    Subsequent followup (Cov Dr Vogel)  Initial evaluation/Pulmonary Critical Care consultation done by Dr Vogel   Patient examined chart reviewed  HOSPITAL ADMISSION [facility]  S 11/25/2018  PATIENT CAME  FROM (if information available)      IVF  NS 50 (11/27)     VITALS/LABS      12/1/2018 afeb 60 130/68 25 100%   12/1/2018 CPAP 5/5/.3 .375/21/56  12/1/2018 W 8.8 Hb 9.5 Plt 256  Na 137 K 3.9 CO2 26 Cr .9   12/1/2018 alb 1.7     REVIEW OF SYMPTOMS    Able to give ROS  NO     PHYSICAL EXAM    HEENT Unremarkable PERRLA atraumatic   RESP Fair air entry EXP prolonged    Harsh breath sound Resp distres mild   CARDIAC S1 S2 No S3     NO JVD    ABDOMEN SOFT BS PRESENT NOT DISTENDED No hepatosplenomegaly PEDAL EDEMA present No calf tenderness  NO rash   GENERAL Not TOXIC looking    EVENTS       12/1/2018 Ertapenem was started 11/30/2018 as 11/29 c showed ESBL E coli and Klebs On 11/30 [place] was placed   11/30/2018 Remains vent dep Fam to call me re whetherto reintubated after extubation or not if they change decvision At preseent the son who is hospitalist told me yesterday am that pt is to be reintubated if needed     GLOBAL ISSUE/BEST PRACTICE:      PROBLEM: HOB elevation:   y            PROBLEM: Stress ulcer proph:    protonix 40 (11/27)           PROBLEM Infection ppx Chlorhexidien .12 (11/27)              PROBLEM: VTE prophylaxis:      hsc (11/29)   PROBLEM: Glycemic control:    lantus (11/28)   PROBLEM: Nutrition:    Glucerna 1.2 960 (11/26) (ng)   PROBLEM: Advanced directive: na     PROBLEM: Allergies:  na  PROCEDURE [place] 11/30/2018     PATIENT DESCRIPTION PATIENT HAMEEDSHON 11/25/2018 ADMISSION [facility] S  CHLOE ABDALLA MD      73 m       PRESENTING PROBLEMS 11/25/2018   Ac hypoxic resp failure  Septic shock  Asp pneum  Urine retn    PMH  DM, Dyslipidemia, CAD s/p CABG, PPM, Parkinson    MAIN PROBLEMS   Shock, Septic 11/26 ECHO EF 46% dd1 Mod MR Mod AI pasp 27  Lacticemia 11/28 LA 3   Sepsis  SPUTUM C 11/29 ESBL E coli/Klebs  Pneumonia Leg n (11/28) 11/26 Sp Klens E coli 11/26 bc n 11/29 CXR GONZALO pneumonia   SP Intubation 11/26  Respiratory failure on vent  AMEYA: Prerenal azotemia, ATN Nonoliguric (11/29/2018)   Hypokalemia  Hyponatremia  Hypophosphatemia 11/28 PO4 2  Urine retention sp Suprapubic 11/26 Dr Stark  Malnutrition 11/28 Alb 2.3   Elevated LFTS 11/28-11/30 -204 -105 ALT 30 -20  11/30/2018 US abd mild gbw edema unlikely jong as per radiologist   Hypothyroid Levoxyl 125 (11/27)   PARKINSONS Sinemet CR 25 1001.5 x2 (11/27)            ASSESSMENT/RECOMMENDATIONS PATIENT SHON HAMEED 11/25/2018 ADMISSION [facility] CHLOE LOZANO MD           SHOCK Off venous vasopressors On IVF Good uo On midodrine 5.2 (11/26)   LACTICEMIA Followup serially   PNEUMONIA Azithro (11/27) (CCPA) zosyn (11/25) ch Ertapenem (11/30) (MDR gnb pn)   11/29/2018 Leg neg Consider dc azithro To dw Dr Cage  SEDATION Midazolam 1.12p (11/27)  Dexmedetomidine (11/26)   VENT Will consider extubation 12/1/2018 On 12/1/2018 I dw son who is an MD and family want patient reintubated in case extubation fails    12/1/2018 5/5/.3 744/34/115   CAD HO CABG On Plavix 75  (11/27)  (11/27) Atorvastat 40 (11/26)   AMYEA Nonoliguric resolving   ELEVATED LFTS Followup serially Hep panel    TIME SPENT Over 35 minutes aggregate critical care time spent on encounter; activities included   direct patient care, counseling and/or coordinating care reviewing notes, lab data/ imaging , discussion with multidisciplinary team/ patient  /family. Risks, benefits, alternatives  discussed in detail.

## 2018-12-01 NOTE — PROVIDER CONTACT NOTE (CRITICAL VALUE NOTIFICATION) - BACKGROUND
PNA/Sepsis  Intubated
admitted asp PNA, sepsis
pt proBNP elevated so cautious with fluid resuscitation

## 2018-12-01 NOTE — PROVIDER CONTACT NOTE (CRITICAL VALUE NOTIFICATION) - ASSESSMENT
pt remains intubated to vent
IVF @ 80ml/hr  IV abx
Lactate 5.8
admitted for sepsis  intuabted
pt ett to vent w/ cpap  + mild fever on and off  pt is on invanz
--will manage pain with ibuprofen 600 q8h PO alternating with percocet 10/650 q8h PO  --dilaudid 0.5mg IV for moderate breakthrough, 1mg IV for severe breakthroug  --lidocaine patch to lower back  --doubt infectious etiology, though should consider   --no red flags that are concerning for cord pathology  --will send lipase with AM labs given epigastric pain and biliary drainage history, though clinical presentation is otherwise inconsistent with this diagnosis  --atpuja ESCALERAN

## 2018-12-01 NOTE — PROGRESS NOTE ADULT - SUBJECTIVE AND OBJECTIVE BOX
78M with PMHc of DM, Dyslipidemia, CAD s/p CABG, PPM, Parkinson's Disease with Dementia, Hypothyroidism and BPH who presented with respiratory distress. Pt admitted with acute hypoxemic respiratory failure due to PNA, severe sepsis, johnny. Pt's hospital course significant for worsening respiratory status requiring intubation.     24 hour events: Pt seen and examined at bedside. Chart/labs reviewed. No acute events    PAST MEDICAL & SURGICAL HISTORY:  Type 2 diabetes mellitus  Dyslipidemia  Hypothyroidism  Parkinson's disease  CAD (coronary artery disease) of artery bypass graft  Benign essential hypertension  S/P CABG (coronary artery bypass graft)  Pacemaker      Review of Systems:  Constitutional: No fever, chills, fatigue  Neuro: No headache, numbness, weakness  Resp: No cough, wheezing, shortness of breath  CVS: No chest pain, palpitations, leg swelling  GI: No abdominal pain, nausea, vomiting, diarrhea   : No dysuria, frequency, incontinence  Skin: No itching, burning, rashes, or lesions   Msk: No joint pain or swelling  Psych: No depression, anxiety, mood swings    T(F): 97.8 (12-01-18 @ 20:01), Max: 100.8 (12-01-18 @ 05:00)  HR: 60 (12-01-18 @ 20:00) (60 - 77)  BP: 157/73 (12-01-18 @ 20:00) (128/68 - 167/75)  RR: 30 (12-01-18 @ 20:00) (17 - 30)  SpO2: 100% (12-01-18 @ 20:00) (98% - 100%)  Wt(kg): --    Mode: CPAP with PS, FiO2: 30, PEEP: 5, PS: 5    CAPILLARY BLOOD GLUCOSE      POCT Blood Glucose.: 164 mg/dL (01 Dec 2018 17:23)      I&O's Summary    30 Nov 2018 07:01  -  01 Dec 2018 07:00  --------------------------------------------------------  IN: 2400 mL / OUT: 2100 mL / NET: 300 mL    01 Dec 2018 07:01  -  01 Dec 2018 21:57  --------------------------------------------------------  IN: 965 mL / OUT: 600 mL / NET: 365 mL        Physical Exam:     Gen: WD/WG male  Neuro: A&Ox3, non-focal  HEENT: NC/AT  Resp: Rhonchi  CVS: nl S1/S2, RRR  Abd: soft, nt, nd, +bs  Ext: no edema, +pulses  Skin: well perfused, warm      Meds:    ertapenem  IVPB   ertapenem  IVPB IV Intermittent  midodrine Oral  atorvastatin Oral  insulin glargine Injectable (LANTUS) SubCutaneous  insulin lispro (HumaLOG) corrective regimen sliding scale SubCutaneous  levothyroxine Enteral Tube  ALBUTerol/ipratropium for Nebulization Nebulizer  acetaminophen    Suspension .. Enteral Tube PRN  aspirin Enteral Tube  carbidopa/levodopa CR 25/100 Oral  dexmedetomidine Infusion IV Continuous  midazolam Injectable IV Push PRN  clopidogrel Tablet Oral  heparin  Injectable SubCutaneous  pantoprazole   Suspension Enteral Tube  dextrose 5%. IV Continuous  sodium chloride 0.9%. IV Continuous                            9.5    8.83  )-----------( 256      ( 01 Dec 2018 06:31 )             28.5       12-01    137  |  104  |  15  ----------------------------<  188<H>  3.9   |  26  |  0.97    Ca    7.8<L>      01 Dec 2018 06:31  Phos  3.4     12-01  Mg     1.9     12-01    TPro  4.7<L>  /  Alb  1.7<L>  /  TBili  0.5  /  DBili  0.3<H>  /  AST  73<H>  /  ALT  15  /  AlkPhos  186<H>  12-01      CARDIAC MARKERS ( 30 Nov 2018 06:57 )  x     / x     / 113 U/L / x     / x          PT/INR - ( 01 Dec 2018 06:31 )   PT: 15.9 sec;   INR: 1.44 ratio             .Sputum Sputum/TRAP   Moderate Klebsiella pneumoniae  Moderate Escherichia coli ESBL  Normal Respiratory Beatriz absent   No polymorphonuclear leukocytes per low power field  No Squamous epithelial cells per low power field  No organisms seen per oil power field 11-29 @ 11:18  .Urine Catheterized   No growth -- 11-28 @ 10:34          CENTRAL LINE: no    LORENZANA: Yes    A-LINE: no    GLOBAL ISSUE/BEST PRACTICE:  Analgesia: yes  Sedation: yes  HOB elevation: yes  Stress ulcer prophylaxis: yes  VTE prophylaxis: yes  Glycemic control: yes  Nutrition: yes      CODE STATUS: Full  GOC discussion: Y  Critical care time spent (mins): 37  (Reviewing data, imaging, discussing with multidisciplinary team, non inclusive of procedures, discussing goals of care with patient/family)

## 2018-12-01 NOTE — PROGRESS NOTE ADULT - ASSESSMENT
78M with PMHx as above, admitted with acute hypoxemic respiratory failure, severe sepsis, RLL PNA, AMEYA, Coagulapathy, transaminitis. Now extubated. Hypomagnesemia      -Pain management  -Monitor HD's. Midodrine decreased to 2.5 mg tid due to htn during day  -Restart BB once off midodrine  -On VM. Wean FiO2 as tolerated. Chest PT/IS/Nebs  -NPO. TF's. PPI  -Renal function improving. Avoid nephrotoxic agents. Monitor  -Replete Mg+  -No active bleed. Monitor PT/INR  -Continue Abx. Monitor wbc/temp  -F/U cultures  -DVT ppx  -Supportive care

## 2018-12-02 LAB
ALBUMIN SERPL ELPH-MCNC: 1.7 G/DL — LOW (ref 3.3–5)
ALP SERPL-CCNC: 162 U/L — HIGH (ref 30–120)
ALT FLD-CCNC: 51 U/L DA — SIGNIFICANT CHANGE UP (ref 10–60)
ANION GAP SERPL CALC-SCNC: 9 MMOL/L — SIGNIFICANT CHANGE UP (ref 5–17)
AST SERPL-CCNC: 56 U/L — HIGH (ref 10–40)
BASE EXCESS BLDA CALC-SCNC: -0.2 MMOL/L — SIGNIFICANT CHANGE UP (ref -2–2)
BASOPHILS # BLD AUTO: 0.04 K/UL — SIGNIFICANT CHANGE UP (ref 0–0.2)
BASOPHILS NFR BLD AUTO: 0.3 % — SIGNIFICANT CHANGE UP (ref 0–2)
BILIRUB SERPL-MCNC: 0.4 MG/DL — SIGNIFICANT CHANGE UP (ref 0.2–1.2)
BLOOD GAS SOURCE: SIGNIFICANT CHANGE UP
BUN SERPL-MCNC: 13 MG/DL — SIGNIFICANT CHANGE UP (ref 7–23)
CALCIUM SERPL-MCNC: 8.2 MG/DL — LOW (ref 8.4–10.5)
CHLORIDE SERPL-SCNC: 101 MMOL/L — SIGNIFICANT CHANGE UP (ref 96–108)
CO2 SERPL-SCNC: 27 MMOL/L — SIGNIFICANT CHANGE UP (ref 22–31)
CREAT SERPL-MCNC: 0.89 MG/DL — SIGNIFICANT CHANGE UP (ref 0.5–1.3)
EOSINOPHIL # BLD AUTO: 0.27 K/UL — SIGNIFICANT CHANGE UP (ref 0–0.5)
EOSINOPHIL NFR BLD AUTO: 2.4 % — SIGNIFICANT CHANGE UP (ref 0–6)
GLUCOSE SERPL-MCNC: 196 MG/DL — HIGH (ref 70–99)
HCO3 BLDA-SCNC: 25 MMOL/L — SIGNIFICANT CHANGE UP (ref 21–29)
HCT VFR BLD CALC: 33 % — LOW (ref 39–50)
HGB BLD-MCNC: 10.6 G/DL — LOW (ref 13–17)
HOROWITZ INDEX BLDA+IHG-RTO: 40 — SIGNIFICANT CHANGE UP
IMM GRANULOCYTES NFR BLD AUTO: 1.7 % — HIGH (ref 0–1.5)
INR BLD: 1.27 RATIO — HIGH (ref 0.88–1.16)
LYMPHOCYTES # BLD AUTO: 19.8 % — SIGNIFICANT CHANGE UP (ref 13–44)
LYMPHOCYTES # BLD AUTO: 2.27 K/UL — SIGNIFICANT CHANGE UP (ref 1–3.3)
MAGNESIUM SERPL-MCNC: 1.8 MG/DL — SIGNIFICANT CHANGE UP (ref 1.6–2.6)
MCHC RBC-ENTMCNC: 27.2 PG — SIGNIFICANT CHANGE UP (ref 27–34)
MCHC RBC-ENTMCNC: 32.1 GM/DL — SIGNIFICANT CHANGE UP (ref 32–36)
MCV RBC AUTO: 84.8 FL — SIGNIFICANT CHANGE UP (ref 80–100)
MONOCYTES # BLD AUTO: 1.05 K/UL — HIGH (ref 0–0.9)
MONOCYTES NFR BLD AUTO: 9.1 % — SIGNIFICANT CHANGE UP (ref 2–14)
NEUTROPHILS # BLD AUTO: 7.66 K/UL — HIGH (ref 1.8–7.4)
NEUTROPHILS NFR BLD AUTO: 66.7 % — SIGNIFICANT CHANGE UP (ref 43–77)
NRBC # BLD: 0 /100 WBCS — SIGNIFICANT CHANGE UP (ref 0–0)
PCO2 BLDA: 32 MMHG — SIGNIFICANT CHANGE UP (ref 32–46)
PH BLD: 7.47 — HIGH (ref 7.35–7.45)
PHOSPHATE SERPL-MCNC: 3.8 MG/DL — SIGNIFICANT CHANGE UP (ref 2.5–4.5)
PLATELET # BLD AUTO: 354 K/UL — SIGNIFICANT CHANGE UP (ref 150–400)
PO2 BLDA: 131 MMHG — HIGH (ref 74–108)
POTASSIUM SERPL-MCNC: 3.9 MMOL/L — SIGNIFICANT CHANGE UP (ref 3.5–5.3)
POTASSIUM SERPL-SCNC: 3.9 MMOL/L — SIGNIFICANT CHANGE UP (ref 3.5–5.3)
PROT SERPL-MCNC: 5.8 G/DL — LOW (ref 6–8.3)
PROTHROM AB SERPL-ACNC: 13.9 SEC — HIGH (ref 10–12.9)
RBC # BLD: 3.89 M/UL — LOW (ref 4.2–5.8)
RBC # FLD: 14.6 % — HIGH (ref 10.3–14.5)
SAO2 % BLDA: 99 % — HIGH (ref 92–96)
SODIUM SERPL-SCNC: 137 MMOL/L — SIGNIFICANT CHANGE UP (ref 135–145)
WBC # BLD: 11.48 K/UL — HIGH (ref 3.8–10.5)
WBC # FLD AUTO: 11.48 K/UL — HIGH (ref 3.8–10.5)

## 2018-12-02 PROCEDURE — 71045 X-RAY EXAM CHEST 1 VIEW: CPT | Mod: 26

## 2018-12-02 PROCEDURE — 99233 SBSQ HOSP IP/OBS HIGH 50: CPT

## 2018-12-02 RX ADMIN — Medication 3 MILLILITER(S): at 01:31

## 2018-12-02 RX ADMIN — ATORVASTATIN CALCIUM 40 MILLIGRAM(S): 80 TABLET, FILM COATED ORAL at 22:15

## 2018-12-02 RX ADMIN — Medication 3 MILLILITER(S): at 08:41

## 2018-12-02 RX ADMIN — Medication 4: at 23:18

## 2018-12-02 RX ADMIN — Medication 3 MILLILITER(S): at 19:41

## 2018-12-02 RX ADMIN — Medication 4: at 06:11

## 2018-12-02 RX ADMIN — CARBIDOPA AND LEVODOPA 1.5 TABLET(S): 25; 100 TABLET ORAL at 06:11

## 2018-12-02 RX ADMIN — Medication 125 MICROGRAM(S): at 06:12

## 2018-12-02 RX ADMIN — INSULIN GLARGINE 6 UNIT(S): 100 INJECTION, SOLUTION SUBCUTANEOUS at 23:18

## 2018-12-02 RX ADMIN — Medication 325 MILLIGRAM(S): at 11:59

## 2018-12-02 RX ADMIN — CARBIDOPA AND LEVODOPA 1.5 TABLET(S): 25; 100 TABLET ORAL at 17:17

## 2018-12-02 RX ADMIN — CLOPIDOGREL BISULFATE 75 MILLIGRAM(S): 75 TABLET, FILM COATED ORAL at 11:59

## 2018-12-02 RX ADMIN — HEPARIN SODIUM 5000 UNIT(S): 5000 INJECTION INTRAVENOUS; SUBCUTANEOUS at 06:11

## 2018-12-02 RX ADMIN — Medication 4: at 17:17

## 2018-12-02 RX ADMIN — HEPARIN SODIUM 5000 UNIT(S): 5000 INJECTION INTRAVENOUS; SUBCUTANEOUS at 17:17

## 2018-12-02 RX ADMIN — MIDODRINE HYDROCHLORIDE 5 MILLIGRAM(S): 2.5 TABLET ORAL at 06:11

## 2018-12-02 RX ADMIN — Medication 3 MILLILITER(S): at 14:19

## 2018-12-02 RX ADMIN — ERTAPENEM SODIUM 120 MILLIGRAM(S): 1 INJECTION, POWDER, LYOPHILIZED, FOR SOLUTION INTRAMUSCULAR; INTRAVENOUS at 08:21

## 2018-12-02 RX ADMIN — PANTOPRAZOLE SODIUM 40 MILLIGRAM(S): 20 TABLET, DELAYED RELEASE ORAL at 11:59

## 2018-12-02 RX ADMIN — Medication 6: at 11:59

## 2018-12-02 NOTE — PROVIDER CONTACT NOTE (CRITICAL VALUE NOTIFICATION) - ACTION/TREATMENT ORDERED:
no new orders
repeat lactate level at 1200
Continue IV Fluids
500cc bolus
Continue same treatment
PA aware, repeat lactate with AM labs
continue to current TX
none at this time
previously reported, no further changes.

## 2018-12-02 NOTE — PROGRESS NOTE ADULT - SUBJECTIVE AND OBJECTIVE BOX
Patient is a 78y old  Male who presents with a chief complaint of Respiratory distress. (02 Dec 2018 11:28)      INTERVAL History of Present Illness/OVERNIGHT EVENTS: no new issues    MEDICATIONS  (STANDING):  ALBUTerol/ipratropium for Nebulization 3 milliLiter(s) Nebulizer every 6 hours  aspirin 325 milliGRAM(s) Enteral Tube daily  atorvastatin 40 milliGRAM(s) Oral at bedtime  carbidopa/levodopa CR 25/100 1.5 Tablet(s) Oral two times a day  clopidogrel Tablet 75 milliGRAM(s) Oral daily  dexmedetomidine Infusion 0.3 MICROgram(s)/kG/Hr (4.522 mL/Hr) IV Continuous <Continuous>  dextrose 5%. 1000 milliLiter(s) (50 mL/Hr) IV Continuous <Continuous>  dextrose 50% Injectable 12.5 Gram(s) IV Push once  dextrose 50% Injectable 25 Gram(s) IV Push once  dextrose 50% Injectable 25 Gram(s) IV Push once  ertapenem  IVPB      ertapenem  IVPB 1000 milliGRAM(s) IV Intermittent every 24 hours  heparin  Injectable 5000 Unit(s) SubCutaneous every 12 hours  insulin glargine Injectable (LANTUS) 6 Unit(s) SubCutaneous at bedtime  insulin lispro (HumaLOG) corrective regimen sliding scale   SubCutaneous every 6 hours  levothyroxine 125 MICROGram(s) Enteral Tube daily  midodrine 5 milliGRAM(s) Oral three times a day  pantoprazole   Suspension 40 milliGRAM(s) Enteral Tube daily  sodium chloride 0.9%. 1000 milliLiter(s) (50 mL/Hr) IV Continuous <Continuous>    MEDICATIONS  (PRN):  acetaminophen    Suspension .. 650 milliGRAM(s) Enteral Tube every 6 hours PRN Temp greater or equal to 38.5C (101.3F)  dextrose 40% Gel 15 Gram(s) Oral once PRN Blood Glucose LESS THAN 70 milliGRAM(s)/deciliter  glucagon  Injectable 1 milliGRAM(s) IntraMuscular once PRN Glucose LESS THAN 70 milligrams/deciliter  midazolam Injectable 1 milliGRAM(s) IV Push every 2 hours PRN sedation      Allergies    No Known Allergies    Intolerances        REVIEW OF SYSTEMS:  Negative unless otherwise specified above.    Vital Signs Last 24 Hrs  T(C): 36.8 (02 Dec 2018 08:57), Max: 37 (01 Dec 2018 12:43)  T(F): 98.2 (02 Dec 2018 08:57), Max: 98.6 (01 Dec 2018 12:43)  HR: 61 (02 Dec 2018 10:00) (60 - 63)  BP: 124/55 (02 Dec 2018 10:00) (124/55 - 167/75)  BP(mean): 74 (02 Dec 2018 10:00) (74 - 103)  RR: 29 (02 Dec 2018 10:00) (22 - 30)  SpO2: 98% (02 Dec 2018 10:00) (98% - 100%)        PHYSICAL EXAM:  GENERAL: No apparent distress   HEAD:  Atraumatic, Normocephalic  EYES: conjunctiva and sclera clear  ENMT: Moist mucous membranes  NECK: Supple  CHEST/LUNG: bilateral rhonchi  HEART: Regular rate and rhythm  ABDOMEN: Soft, Nontender, Nondistended; Bowel sounds present  EXTREMITIES:  No clubbing, cyanosis or edema  SKIN: Ecchymoses  NERVOUS SYSTEM:   Mask like facies from Parkinson's  Aphasic      LABS:                        10.6   11.48 )-----------( 354      ( 02 Dec 2018 06:56 )             33.0     02 Dec 2018 06:56    137    |  101    |  13     ----------------------------<  196    3.9     |  27     |  0.89     Ca    8.2        02 Dec 2018 06:56  Phos  3.8       02 Dec 2018 06:56  Mg     1.8       02 Dec 2018 06:56    TPro  5.8    /  Alb  1.7    /  TBili  0.4    /  DBili  x      /  AST  56     /  ALT  51     /  AlkPhos  162    02 Dec 2018 06:56    PT/INR - ( 02 Dec 2018 06:56 )   PT: 13.9 sec;   INR: 1.27 ratio             CAPILLARY BLOOD GLUCOSE      POCT Blood Glucose.: 251 mg/dL (02 Dec 2018 11:58)  POCT Blood Glucose.: 214 mg/dL (02 Dec 2018 05:44)  POCT Blood Glucose.: 149 mg/dL (01 Dec 2018 23:37)  POCT Blood Glucose.: 164 mg/dL (01 Dec 2018 17:23)  POCT Blood Glucose.: 135 mg/dL (01 Dec 2018 12:49)      RADIOLOGY & ADDITIONAL TESTS:    Images reviewed personally    Consultant Notes Reviewed and Care Discussed with relevant Consultants/Other Providers.

## 2018-12-02 NOTE — PROGRESS NOTE ADULT - ASSESSMENT
79 y/o M with PMH of DM, Dyslipidemia, CAD s/p CABG, PPM, Parkinson's Disease with Dementia, Hypothyroidism and BPH presented with respiratory distress.     1. Acute respiratory failure/Sepsis/PNA  -S/P intubation - now extubated  Possibly secondary to CAP VS.  Aspiration PNA  -Continue with  current antibiotic as per ID  -RSV panel negative.  Blood culture NGTD.  -Further care as per pulmonary.    2.  Acute renal failure With urinary retention.  Possibly due to ATN VS. Dehydration in the setting of infection.  -Suprapubic cath  was inserted as per urology  -Continue with IVF  -Avoid nephrotoxin  -nephrology to follow up      3. Coagulopathy. improving    4.  DM2  -Continue with ISS, and Monitor POC  -Hold oral medications    5. HX of CAD.  Continue with ASA, and Metoprolol    6.  Abnormal trop level  -Seems to be secondary to demand mismatch ischemia in the setting of sepsis, and infection  likely due to demand ischemia in setting of renal insufficiency and sepsis.    7.  Parkinson's disease.  Will resume home medications  -Patient has been on puree diet  -Son/Dr. Grider and spouse refuses Peg tube placement  swallow eval    8. sputum + klebsilla and ESBL e coli-   - antibiotic change to INVANZ.    9. Elevated LFTs- resolving    10. DVT proph heparin    Prognosis is poor.  quality of life is limited  if family refuse PEG and patient not eating, will consider HOSPICE  d/w RN  spent 37 mins

## 2018-12-02 NOTE — CHART NOTE - NSCHARTNOTEFT_GEN_A_CORE
Assessment:     Pt c PMH of DM, Dyslipidemia, CAD s/p CABG, PPM, Parkinson's Disease with Dementia, Hypothyroidism and BPH presented with respiratory failure/sepsis/pna. Pt is receiving Glucerna 1.2 60ml/hr via NGT which meets pt's needs. Per RN pt just extubated and SLP consult is pending. Pt family does not want PEG placement if he does not pass swallow eval.  Hospice may be considered per MD note.        Factors impacting intake: [ ] none [ ] nausea  [ ] vomiting [ ] diarrhea [ ] constipation  [ ]chewing problems [x ] swallowing issues  [ ] other:     Diet Presciption: Diet, NPO with Tube Feed:   Tube Feeding Modality: Nasogastric  Glucerna 1.2 Kush  Total Volume for 24 Hours (mL): 1440  Continuous  Starting Tube Feed Rate {mL per Hour}: 40  Increase Tube Feed Rate by (mL): 10     Every 6 hours  Until Goal Tube Feed Rate (mL per Hour): 60  Tube Feed Duration (in Hours): 24  Tube Feed Start Time: 12:00 (11-29-18 @ 11:22)    Intake: NA    Current Weight: Weight (kg): 60.3 (11-25 @ 20:00)  % Weight Change 134#    Pertinent Medications: MEDICATIONS  (STANDING):  ALBUTerol/ipratropium for Nebulization 3 milliLiter(s) Nebulizer every 6 hours  aspirin 325 milliGRAM(s) Enteral Tube daily  atorvastatin 40 milliGRAM(s) Oral at bedtime  carbidopa/levodopa CR 25/100 1.5 Tablet(s) Oral two times a day  clopidogrel Tablet 75 milliGRAM(s) Oral daily  dexmedetomidine Infusion 0.3 MICROgram(s)/kG/Hr (4.522 mL/Hr) IV Continuous <Continuous>  dextrose 5%. 1000 milliLiter(s) (50 mL/Hr) IV Continuous <Continuous>  dextrose 50% Injectable 12.5 Gram(s) IV Push once  dextrose 50% Injectable 25 Gram(s) IV Push once  dextrose 50% Injectable 25 Gram(s) IV Push once  ertapenem  IVPB      ertapenem  IVPB 1000 milliGRAM(s) IV Intermittent every 24 hours  heparin  Injectable 5000 Unit(s) SubCutaneous every 12 hours  insulin glargine Injectable (LANTUS) 6 Unit(s) SubCutaneous at bedtime  insulin lispro (HumaLOG) corrective regimen sliding scale   SubCutaneous every 6 hours  levothyroxine 125 MICROGram(s) Enteral Tube daily  midodrine 5 milliGRAM(s) Oral three times a day  pantoprazole   Suspension 40 milliGRAM(s) Enteral Tube daily  sodium chloride 0.9%. 1000 milliLiter(s) (50 mL/Hr) IV Continuous <Continuous>    MEDICATIONS  (PRN):  acetaminophen    Suspension .. 650 milliGRAM(s) Enteral Tube every 6 hours PRN Temp greater or equal to 38.5C (101.3F)  dextrose 40% Gel 15 Gram(s) Oral once PRN Blood Glucose LESS THAN 70 milliGRAM(s)/deciliter  glucagon  Injectable 1 milliGRAM(s) IntraMuscular once PRN Glucose LESS THAN 70 milligrams/deciliter  midazolam Injectable 1 milliGRAM(s) IV Push every 2 hours PRN sedation    Pertinent Labs: 12-02 Na137 mmol/L Glu 196 mg/dL<H> K+ 3.9 mmol/L Cr  0.89 mg/dL BUN 13 mg/dL 12-02 Phos 3.8 mg/dL 12-02 Alb 1.7 g/dL<L> 11-27 [id number] 8.2 %<H>     CAPILLARY BLOOD GLUCOSE      POCT Blood Glucose.: 251 mg/dL (02 Dec 2018 11:58)  POCT Blood Glucose.: 214 mg/dL (02 Dec 2018 05:44)  POCT Blood Glucose.: 149 mg/dL (01 Dec 2018 23:37)  POCT Blood Glucose.: 164 mg/dL (01 Dec 2018 17:23)    Skin: sacrum stage II, coccyx stage II, L hip DTIL foot/top of foot- unstaged).    Estimated Needs:   [ ] no change since previous assessment  [ ] recalculated:     Previous Nutrition Diagnosis:   [ ] Inadequate Energy Intake [ ]Inadequate Oral Intake [ ] Excessive Energy Intake   [ ] Underweight [ ] Increased Nutrient Needs [ ] Overweight/Obesity   [ ] Altered GI Function [ ] Unintended Weight Loss [ ] Food & Nutrition Related Knowledge Deficit [ ] Malnutrition     Nutrition Diagnosis is [ ] ongoing  [ ] resolved [ ] not applicable     New Nutrition Diagnosis: [ ] not applicable       Interventions:   Recommend  [ ] Change Diet To:  [ ] Nutrition Supplement  [ ] Nutrition Support  [ ] Other:     Monitoring and Evaluation:   [ ] PO intake [ x ] Tolerance to diet prescription [ x ] weights [ x ] labs[ x ] follow up per protocol  [ ] other:

## 2018-12-02 NOTE — PROGRESS NOTE ADULT - SUBJECTIVE AND OBJECTIVE BOX
ID Progress note     Name: SHON HAMEED  Age: 78y  Gender: Male  MRN: [id number]    Interval History-- Events noted , doing well s/p extubation . On AM , remains congested, NGT in place , await speech therapy evaluation   Notes reviewed    Past Medical History--  Type 2 diabetes mellitus  Dyslipidemia  Hypothyroidism  Parkinson's disease  Diabetes mellitus  CAD (coronary artery disease) of artery bypass graft  Benign essential hypertension  Adult hypothyroidism  Acquired hypothyroidism  S/P CABG (coronary artery bypass graft)  Pacemaker      For details regarding the patient's social history, family history, and other miscellaneous elements, please refer the initial infectious diseases consultation and/or the admitting history and physical examination for this admission.    Allergies--  Allergies    No Known Allergies    Intolerances        Medications--  Antibiotics:  ertapenem  IVPB      ertapenem  IVPB 1000 milliGRAM(s) IV Intermittent every 24 hours    Immunologic:    Other:  acetaminophen    Suspension .. PRN  ALBUTerol/ipratropium for Nebulization  aspirin  atorvastatin  carbidopa/levodopa CR 25/100  clopidogrel Tablet  dexmedetomidine Infusion  dextrose 40% Gel PRN  dextrose 5%.  dextrose 50% Injectable  dextrose 50% Injectable  dextrose 50% Injectable  glucagon  Injectable PRN  heparin  Injectable  insulin glargine Injectable (LANTUS)  insulin lispro (HumaLOG) corrective regimen sliding scale  levothyroxine  midazolam Injectable PRN  midodrine  pantoprazole   Suspension  sodium chloride 0.9%.      Review of Systems--  Review of systems unable to be obtained secondary to clinical condition.    Physical Examination--    Vital Signs: T(F): 98.4 (12-02-18 @ 04:08), Max: 98.6 (12-01-18 @ 08:15)  HR: 62 (12-02-18 @ 06:00)  BP: 150/66 (12-02-18 @ 06:00)  RR: 29 (12-02-18 @ 06:00)  SpO2: 100% (12-02-18 @ 06:00)  Wt(kg): --  General: Nontoxic-appearing Male in no acute distress.  HEENT: AT/NC. PERRL. EOMI. Anicteric. Conjunctiva pink and moist. Oropharynx retained secretions,  Dentition fair.  Neck: Not rigid. No sense of mass.  Nodes: None palpable.  Lungs: Coarse breath sounds  bilaterally without rales, wheezing or rhonchi  Heart: Regular rate and rhythm. No Murmur. No rub. No gallop. No palpable thrill.  Abdomen: Bowel sounds present and normoactive. Soft. Nondistended. Nontender. No sense of mass. No organomegaly.  Back: No spinal tenderness. No costovertebral angle tenderness.   Extremities: No cyanosis or clubbing. No edema.   Skin: Warm. Dry. Good turgor. No rash. No vasculitic stigmata.  Psychiatric: Appropriate affect and mood for situation.         Laboratory Studies--  CBC                        10.6   11.48 )-----------( 354      ( 02 Dec 2018 06:56 )             33.0       Chemistries  12-02    137  |  101  |  13  ----------------------------<  196<H>  3.9   |  27  |  0.89    Ca    8.2<L>      02 Dec 2018 06:56  Phos  3.8     12-02  Mg     1.8     12-02    TPro  5.8<L>  /  Alb  1.7<L>  /  TBili  0.4  /  DBili  x   /  AST  56<H>  /  ALT  51  /  AlkPhos  162<H>  12-02    CAPILLARY BLOOD GLUCOSE      POCT Blood Glucose.: 214 mg/dL (02 Dec 2018 05:44)  POCT Blood Glucose.: 149 mg/dL (01 Dec 2018 23:37)  POCT Blood Glucose.: 164 mg/dL (01 Dec 2018 17:23)  POCT Blood Glucose.: 135 mg/dL (01 Dec 2018 12:49)      Culture Data    Culture - Sputum (collected 29 Nov 2018 11:18)  Source: .Sputum Sputum/TRAP  Gram Stain (29 Nov 2018 13:43):    No polymorphonuclear leukocytes per low power field    No Squamous epithelial cells per low power field    No organisms seen per oil power field  Final Report (01 Dec 2018 09:26):    Moderate Klebsiella pneumoniae    Moderate Escherichia coli ESBL    Normal Respiratory Beatriz absent  Organism: Klebsiella pneumoniae  Escherichia coli ESBL (01 Dec 2018 09:26)  Organism: Escherichia coli ESBL (01 Dec 2018 09:26)  Organism: Klebsiella pneumoniae (01 Dec 2018 09:26)    Culture - Urine (collected 28 Nov 2018 10:34)  Source: .Urine Catheterized  Final Report (29 Nov 2018 11:24):    No growth    Culture - Sputum (collected 26 Nov 2018 17:15)  Source: .Sputum Sputum  Gram Stain (26 Nov 2018 21:35):    Moderate polymorphonuclear leukocytes per low power field    Rare Squamous epithelial cells per low power field    Moderate Gram positive cocci in pairs per oil power field    Few Gram Negative Rods per oil power field  Final Report (29 Nov 2018 18:18):    Moderate Klebsiella pneumoniae    Moderate Escherichia coli ESBL    Normal Respiratory Beatriz present  Organism: Klebsiella pneumoniae  Escherichia coli ESBL (29 Nov 2018 18:18)  Organism: Escherichia coli ESBL (29 Nov 2018 18:18)  Organism: Klebsiella pneumoniae (29 Nov 2018 18:18)    Culture - Blood (collected 26 Nov 2018 01:50)  Source: .Blood Blood  Final Report (01 Dec 2018 02:01):    No growth at 5 days.    Culture - Blood (collected 26 Nov 2018 01:50)  Source: .Blood Blood  Final Report (01 Dec 2018 02:01):    No growth at 5 days.          Radiology:  Xray Chest 1 View- PORTABLE-Routine (12.01.18 @ 06:47) >  Findings: There is a cardiac pacemaker overlying the left chest wall. The   patient is status post median sternotomy. Multiple mediastinal clips are   noted. The patient is status post endotracheal tube placement in good   position.    A patchy right lung base opacity appears essentially unchanged. The left   lung remains grossly clear. The heart size is normal. There are mild   multilevel degenerative changes of the thoracic spine.        Assessment--  77 y/o M with PMH of DM, Dyslipidemia, CAD s/p CABG, PPM, Parkinson's Disease with Dementia, Hypothyroidism and BPH who presented with respiratory distress.  Has acute hypoxic respiratory failure secondary to  pneumonia likely aspiration as he has parkinson's diseases. Could have severe CAP too He was febrile and hypoxic , likely has severe sepsis with septic shock. He has been not on any pressors . Based on his presentation he will likely have positive blood cs   He likely has Gram negative pneumonia secondary to aspiration . Doubt its legionella., his urine legionella antigen is negative     he has poorly controlled DM    Sputum cs shows Klebsiella and ESBL E coli     he is s/p extubation , doing well,     Plan:  - will continue with  IV Invanz 1 gram daily as he has ESBL E coli  in sputum   - s/p extubation yesterday  , family may consider DNI   - Await speech therapy evaluation , most likely will fail and  may need PEG , family likely to refuse PEG but want to continue with NGT feeds till seen by speech therapy   - serial CXR  - overall prognosis poor    Continue with present regime .  Appropriate use of antibiotics and adverse effects reviewed.    I have discussed the above plan of care with patient's family in detail. They expressed understanding of the treatment plan . Risks, benefits and alternatives discussed in detail. I have asked if they have any questions or concerns and appropriately addressed them to the best of my ability .      > 35 minutes spent in direct patient care reviewing  the notes, lab data/ imaging , discussion with multidisciplinary team. All questions were addressed and answered to the best of my capacity .    Thank you for allowing me to participate in the care of your patient .        Stu Cage MD  [phone]

## 2018-12-02 NOTE — PROVIDER CONTACT NOTE (CRITICAL VALUE NOTIFICATION) - PERSON GIVING RESULT:
Nayeli Manuel
Anablel/ Clinton
Eboni Briceno
LAB/ Anabell
Lab/ Selma De La Torre
ita
marco a/  Nayeli
marco a/Nayeli

## 2018-12-02 NOTE — PROVIDER CONTACT NOTE (CRITICAL VALUE NOTIFICATION) - NAME OF MD/NP/PA/DO NOTIFIED:
Dr. Grider/
Luis CANDELARIA
zoey parrish
BARI ramsay
DR Grider
DR Perez
Dr. Malik
Dr. Malik
Dr. aMlik
Luis CANDELARIA

## 2018-12-02 NOTE — PROGRESS NOTE ADULT - SUBJECTIVE AND OBJECTIVE BOX
ZARI SHON BURGER [facility] S [phone]   ALLERGY nka   ADMITTING MD Dr Chloe Abdalla  CONTACT Sheree P [phone] Son Jalen P  [phone]  REASON FOR VISIT    Subsequent followup (Cov Dr Vogel)  Initial evaluation/Pulmonary Critical Care consultation done by Dr Vogel   Patient examined chart reviewed  HOSPITAL ADMISSION [facility] 11/25/2018  PATIENT CAME  FROM (if information available)      IVF  NS 50 (11/27)     VITALS/LABS      12/2/2018 afeb 60 130/60 25   12/2/2018 W 11.4 Hb 10.6 Plt 354  Na 137 K 3.9 CO2 27 Cr .89    REVIEW OF SYMPTOMS    Able to give ROS  Yes     RELIABLE No   CONSTITUTIONAL Weakness Yes  Chills No Vision changes No  ENDOCRINE No unexplained hair loss No heat or cold intolerance    ALLERGY No hives  Sore throat No   RESP Coughing blood no  Shortness of breath YES   NEURO No Headache  Confusion Pain neck No   CARDIAC No Chest pain No Palpitations   GI No Pain abdomen NO   Vomiting NO     PHYSICAL EXAM    HEENT Unremarkable PERRLA atraumatic   RESP Fair air entry EXP prolonged    Harsh breath sound Resp distres mild   CARDIAC S1 S2 No S3     NO JVD    ABDOMEN SOFT BS PRESENT NOT DISTENDED No hepatosplenomegaly PEDAL EDEMA present No calf tenderness  NO rash   GENERAL Not TOXIC looking    EVENTS        12/1/2018 Ertapenem was started 11/30/2018 as 11/29 c showed ESBL E coli and Klebs On 11/30 [place] was placed Extubated 12/1     GLOBAL ISSUE/BEST PRACTICE:      PROBLEM: HOB elevation:   y            PROBLEM: Stress ulcer proph:    protonix 40 (11/27)           PROBLEM Infection ppx Chlorhexidien .12 (11/27)              PROBLEM: VTE prophylaxis:      hsc (11/29)   PROBLEM: Glycemic control:    lantus (11/28)   PROBLEM: Nutrition:    Glucerna 1.2 960 (11/26) (ng)   PROBLEM: Advanced directive: na     PROBLEM: Allergies:  na  PROCEDURE [place] 11/30/2018     PATIENT DESCRIPTION PATIENT HAMEEDSHON MARIANO JENNYFER 11/25/2018 ADMISSION [facility]  CHLOE ABDALLA MD      73 m PMH DM, Dyslipidemia, CAD s/p CABG, PPM,Parkinson admitted 11/25 with respiratory distress, acute hypoxic respiratory failure secondary to  aspiration pneumonia (parkinsons) with septic shock      PRESENTING PROBLEMS 11/25/2018   Ac hypoxic resp failure  Septic shock  Asp pneum  Urine retn    MAIN PROBLEMS   Shock, Septic 11/26 ECHO EF 46% dd1 Mod MR Mod AI pasp 27  Lacticemia 11/28 LA 3   Sepsis  SPUTUM C 11/29 ESBL E coli/Klebs  Pneumonia Leg n (11/28) 11/26 Sp Klens E coli 11/26 bc n 11/29 CXR GONZALO pneumonia   SP Intubation 11/26-12/1  Respiratory failure  SP  vent 12/2/2018 .4 747/32/131   AMEYA: Prerenal azotemia, ATN Nonoliguric (11/29/2018)   Hypokalemia  Hyponatremia  Hypophosphatemia 11/28 PO4 2  Urine retention sp Suprapubic 11/26 Dr Stark  Malnutrition 11/28 Alb 2.3   Elevated LFTS 11/28-11/30-12/2 -204-162 -105-56 ALT 30 -20-51  11/30/2018 US abd mild gbw edema unlikely jong as per radiologist   Hypothyroid Levoxyl 125 (11/27)   PARKINSONS Sinemet CR 25 1001.5 x2 (11/27)          ASSESSMENT/RECOMMENDATIONS PATIENT HAMEEDSHON MARIANO D 11/25/2018 ADMISSION [facility] CHLOE LOZANO MD           SHOCK Off venous vasopressors On IVF Good uo On midodrine 5.2 (11/26)   LACTICEMIA Followup serially   PNEUMONIA Azithro (11/27) (CCPA) zosyn (11/25) ch Ertapenem (11/30) (MDR gnb pn)   11/29/2018 Leg neg Consider dc azithro To dw Dr Cage  SEDATION Midazolam 1.12p (11/27)  Dexmedetomidine (11/26)   VENT Extubated 12/1  On 12/1/2018 I dw son who is an MD and family want patient reintubated in case extubation fails    12/1/2018 5/5/.3 744/34/115   CAD HO CABG On Plavix 75  (11/27)  (11/27) Atorvastat 40 (11/26)   AMEYA Nonoliguric resolving   ELEVATED LFTS Followup serially Resolving    TIME SPENT Over 35 minutes aggregate critical care time spent on encounter; activities included   direct patient care, counseling and/or coordinating care reviewing notes, lab data/ imaging , discussion with multidisciplinary team/ patient  /family. Risks, benefits, alternatives  discussed in detail.

## 2018-12-03 DIAGNOSIS — R13.10 DYSPHAGIA, UNSPECIFIED: ICD-10-CM

## 2018-12-03 LAB
ALBUMIN SERPL ELPH-MCNC: 1.8 G/DL — LOW (ref 3.3–5)
ALP SERPL-CCNC: 143 U/L — HIGH (ref 30–120)
ALT FLD-CCNC: 54 U/L DA — SIGNIFICANT CHANGE UP (ref 10–60)
ANION GAP SERPL CALC-SCNC: 6 MMOL/L — SIGNIFICANT CHANGE UP (ref 5–17)
AST SERPL-CCNC: 44 U/L — HIGH (ref 10–40)
BILIRUB SERPL-MCNC: 0.4 MG/DL — SIGNIFICANT CHANGE UP (ref 0.2–1.2)
BUN SERPL-MCNC: 15 MG/DL — SIGNIFICANT CHANGE UP (ref 7–23)
CALCIUM SERPL-MCNC: 8.4 MG/DL — SIGNIFICANT CHANGE UP (ref 8.4–10.5)
CHLORIDE SERPL-SCNC: 100 MMOL/L — SIGNIFICANT CHANGE UP (ref 96–108)
CO2 SERPL-SCNC: 28 MMOL/L — SIGNIFICANT CHANGE UP (ref 22–31)
CREAT SERPL-MCNC: 0.93 MG/DL — SIGNIFICANT CHANGE UP (ref 0.5–1.3)
GLUCOSE SERPL-MCNC: 263 MG/DL — HIGH (ref 70–99)
HCT VFR BLD CALC: 32.9 % — LOW (ref 39–50)
HGB BLD-MCNC: 10.9 G/DL — LOW (ref 13–17)
INR BLD: 1.19 RATIO — HIGH (ref 0.88–1.16)
MAGNESIUM SERPL-MCNC: 2 MG/DL — SIGNIFICANT CHANGE UP (ref 1.6–2.6)
MCHC RBC-ENTMCNC: 27.7 PG — SIGNIFICANT CHANGE UP (ref 27–34)
MCHC RBC-ENTMCNC: 33.1 GM/DL — SIGNIFICANT CHANGE UP (ref 32–36)
MCV RBC AUTO: 83.7 FL — SIGNIFICANT CHANGE UP (ref 80–100)
NRBC # BLD: 0 /100 WBCS — SIGNIFICANT CHANGE UP (ref 0–0)
PHOSPHATE SERPL-MCNC: 4 MG/DL — SIGNIFICANT CHANGE UP (ref 2.5–4.5)
PLATELET # BLD AUTO: 421 K/UL — HIGH (ref 150–400)
POTASSIUM SERPL-MCNC: 3.8 MMOL/L — SIGNIFICANT CHANGE UP (ref 3.5–5.3)
POTASSIUM SERPL-SCNC: 3.8 MMOL/L — SIGNIFICANT CHANGE UP (ref 3.5–5.3)
PROT SERPL-MCNC: 6 G/DL — SIGNIFICANT CHANGE UP (ref 6–8.3)
PROTHROM AB SERPL-ACNC: 13 SEC — HIGH (ref 10–12.9)
RBC # BLD: 3.93 M/UL — LOW (ref 4.2–5.8)
RBC # FLD: 14.5 % — SIGNIFICANT CHANGE UP (ref 10.3–14.5)
SODIUM SERPL-SCNC: 134 MMOL/L — LOW (ref 135–145)
WBC # BLD: 11.55 K/UL — HIGH (ref 3.8–10.5)
WBC # FLD AUTO: 11.55 K/UL — HIGH (ref 3.8–10.5)

## 2018-12-03 PROCEDURE — 99233 SBSQ HOSP IP/OBS HIGH 50: CPT

## 2018-12-03 PROCEDURE — 71045 X-RAY EXAM CHEST 1 VIEW: CPT | Mod: 26

## 2018-12-03 RX ORDER — TAMSULOSIN HYDROCHLORIDE 0.4 MG/1
0.4 CAPSULE ORAL AT BEDTIME
Qty: 0 | Refills: 0 | Status: DISCONTINUED | OUTPATIENT
Start: 2018-12-03 | End: 2018-12-07

## 2018-12-03 RX ORDER — CARBIDOPA AND LEVODOPA 25; 100 MG/1; MG/1
1 TABLET ORAL
Qty: 0 | Refills: 0 | Status: DISCONTINUED | OUTPATIENT
Start: 2018-12-03 | End: 2018-12-07

## 2018-12-03 RX ADMIN — Medication 3 MILLILITER(S): at 00:24

## 2018-12-03 RX ADMIN — Medication 3 MILLILITER(S): at 13:03

## 2018-12-03 RX ADMIN — Medication 3 MILLILITER(S): at 19:50

## 2018-12-03 RX ADMIN — CLOPIDOGREL BISULFATE 75 MILLIGRAM(S): 75 TABLET, FILM COATED ORAL at 11:01

## 2018-12-03 RX ADMIN — Medication 6: at 06:11

## 2018-12-03 RX ADMIN — Medication 3 MILLILITER(S): at 07:21

## 2018-12-03 RX ADMIN — ATORVASTATIN CALCIUM 40 MILLIGRAM(S): 80 TABLET, FILM COATED ORAL at 21:26

## 2018-12-03 RX ADMIN — CARBIDOPA AND LEVODOPA 1 TABLET(S): 25; 100 TABLET ORAL at 18:24

## 2018-12-03 RX ADMIN — CARBIDOPA AND LEVODOPA 1 TABLET(S): 25; 100 TABLET ORAL at 21:26

## 2018-12-03 RX ADMIN — Medication 4: at 23:12

## 2018-12-03 RX ADMIN — Medication 4: at 12:14

## 2018-12-03 RX ADMIN — ERTAPENEM SODIUM 120 MILLIGRAM(S): 1 INJECTION, POWDER, LYOPHILIZED, FOR SOLUTION INTRAMUSCULAR; INTRAVENOUS at 07:40

## 2018-12-03 RX ADMIN — Medication 125 MICROGRAM(S): at 06:12

## 2018-12-03 RX ADMIN — HEPARIN SODIUM 5000 UNIT(S): 5000 INJECTION INTRAVENOUS; SUBCUTANEOUS at 17:23

## 2018-12-03 RX ADMIN — PANTOPRAZOLE SODIUM 40 MILLIGRAM(S): 20 TABLET, DELAYED RELEASE ORAL at 11:01

## 2018-12-03 RX ADMIN — TAMSULOSIN HYDROCHLORIDE 0.4 MILLIGRAM(S): 0.4 CAPSULE ORAL at 21:26

## 2018-12-03 RX ADMIN — INSULIN GLARGINE 6 UNIT(S): 100 INJECTION, SOLUTION SUBCUTANEOUS at 23:11

## 2018-12-03 RX ADMIN — CARBIDOPA AND LEVODOPA 1.5 TABLET(S): 25; 100 TABLET ORAL at 06:11

## 2018-12-03 RX ADMIN — Medication 2: at 17:38

## 2018-12-03 RX ADMIN — Medication 325 MILLIGRAM(S): at 11:01

## 2018-12-03 RX ADMIN — HEPARIN SODIUM 5000 UNIT(S): 5000 INJECTION INTRAVENOUS; SUBCUTANEOUS at 06:11

## 2018-12-03 NOTE — SWALLOW BEDSIDE ASSESSMENT ADULT - PHARYNGEAL PHASE
Delayed pharyngeal swallow/Decreased laryngeal elevation/Delayed cough post oral intake/Throat clear post oral intake/Cough post oral intake Delayed pharyngeal swallow/Decreased laryngeal elevation/Multiple swallows/Throat clear post oral intake/Wet vocal quality post oral intake/Cough post oral intake

## 2018-12-03 NOTE — SWALLOW BEDSIDE ASSESSMENT ADULT - ORAL PREPARATORY PHASE
requires several attempts to accept bolus/Refuses to accept bolus into oral cavity Reduced oral grading/Decreased mastication ability

## 2018-12-03 NOTE — SWALLOW BEDSIDE ASSESSMENT ADULT - COMMENTS
Pt is 79 y/o male admitted following not eating at home, breathing with audible wheezing. PMH dementia and Parkinson's disease. Pt is s/p intubation/extubation, alert but not follownig commands. + RLL PNA. Pt accepting minimal PO trials. Significantly delayed swallow with all trials presented. Delayed coughing/throat clearing post 100% of trials. Recommend continue NPO with NG tube feeds as patient is unable to meet nutritional needs orally at this time and is at high risk for aspiration.

## 2018-12-03 NOTE — PHARMACY COMMUNICATION NOTE - COMMENTS
ASP: restricted antibiotic  CrCl = 45ml/min  Ertapenem 1gm IVPB q24h  ID physician on case: Dr. CAIT Cage

## 2018-12-03 NOTE — PROGRESS NOTE ADULT - SUBJECTIVE AND OBJECTIVE BOX
PULMONARY/CRITICAL CARE      INTERVAL HPI/OVERNIGHT EVENTS:    78y MaleHPI:  Extubated, no sob. Poor mental status. Ng feedings.  This is an 79 y/o M with PMH of DM, Dyslipidemia, CAD s/p CABG, PPM, Parkinson's Disease with Dementia, Hypothyroidism and BPH who presented with respiratory distress. As per patient's wife at the bedside he was at his baseline condition till 4 days ago when she noticed that he closes his mouth & doesn't allow any thing to be put in his mouth, and she has to use a dripper to feed him. Yesterday she noticed that he is not responding, and today he was breathing fast with audible wheezing, so she brought him to the hospital, no cough, fever, or chills at home, also denies vomiting, no BM over the past 4 days. Wife stated also that patient "lost his voice" a month ago because of his parkinson's disease, but was able to understand and interact non verbally, till yesterday. (25 Nov 2018 23:07)        PAST MEDICAL & SURGICAL HISTORY:  Type 2 diabetes mellitus  Dyslipidemia  Hypothyroidism  Parkinson's disease  CAD (coronary artery disease) of artery bypass graft  Benign essential hypertension  S/P CABG (coronary artery bypass graft)  Pacemaker        ICU Vital Signs Last 24 Hrs  T(C): 36.9 (03 Dec 2018 04:15), Max: 37 (02 Dec 2018 20:01)  T(F): 98.4 (03 Dec 2018 04:15), Max: 98.6 (02 Dec 2018 20:01)  HR: 64 (03 Dec 2018 04:00) (60 - 70)  BP: 123/61 (03 Dec 2018 04:00) (106/51 - 163/68)  BP(mean): 78 (03 Dec 2018 04:00) (67 - 95)  ABP: --  ABP(mean): --  RR: 21 (03 Dec 2018 04:00) (21 - 32)  SpO2: 94% (03 Dec 2018 04:00) (94% - 100%)    Qtts:     I&O's Summary    01 Dec 2018 07:01  -  02 Dec 2018 07:00  --------------------------------------------------------  IN: 2215 mL / OUT: 1600 mL / NET: 615 mL    02 Dec 2018 07:01  -  03 Dec 2018 06:55  --------------------------------------------------------  IN: 2455 mL / OUT: 1900 mL / NET: 555 mL            REVIEW OF SYSTEMS:    CONSTITUTIONAL: No fever, weight loss, or fatigue  RESPIRATORY: No cough, wheezing, chills or hemoptysis; No shortness of breath  CARDIOVASCULAR: No chest pain, palpitations, dizziness, or leg swelling  GASTROINTESTINAL: No abdominal or epigastric pain. No nausea, vomiting, or hematemesis; No diarrhea or constipation. No melena or hematochezia.  GENITOURINARY: No dysuria, frequency, hematuria, or incontinence  NEUROLOGICAL: No headaches, has memory loss, loss of strength, no  numbness, or tremors  SKIN: No itching, burning, rashes, or lesions   LYMPH NODES: No enlarged glands  ENDOCRINE: No heat or cold intolerance; No hair loss  MUSCULOSKELETAL: No joint pain or swelling; No muscle, back, or extremity pain, no calf tenderness  PSYCHIATRIC: No depression, anxiety, mood swings, or difficulty sleeping          PHYSICAL EXAM:    GENERAL: NAD, well-groomed, well-developed, elderly male  HEAD:  Atraumatic, Normocephalic  EYES: EOMI, PERRLA, conjunctiva and sclera clear  ENMT: No tonsillar erythema, exudates, or enlargement; Moist mucous membranes, Good dentition, No lesions  NECK: Supple, No JVD, Normal thyroid  NERVOUS SYSTEM:  opens eyes, nonverbal, rigid extremities.  CHEST/LUNG: Clear to percussion bilaterally; No rales, rhonchi, wheezing, or rubs  HEART: Regular rate and rhythm; No murmurs, rubs, or gallops  ABDOMEN: Soft, Nontender, Nondistended; Bowel sounds present  EXTREMITIES:  2+ Peripheral Pulses, No clubbing, cyanosis, or edema  LYMPH: No lymphadenopathy noted  SKIN: No rashes or lesions        LABS:                        10.9   11.55 )-----------( 421      ( 03 Dec 2018 06:46 )             32.9     12-02    137  |  101  |  13  ----------------------------<  196<H>  3.9   |  27  |  0.89    Ca    8.2<L>      02 Dec 2018 06:56  Phos  3.8     12-02  Mg     1.8     12-02    TPro  5.8<L>  /  Alb  1.7<L>  /  TBili  0.4  /  DBili  x   /  AST  56<H>  /  ALT  51  /  AlkPhos  162<H>  12-02    PT/INR - ( 02 Dec 2018 06:56 )   PT: 13.9 sec;   INR: 1.27 ratio             ABG - ( 02 Dec 2018 05:31 )  pH, Arterial: x     pH, Blood: 7.47  /  pCO2: 32    /  pO2: 131   / HCO3: 25    / Base Excess: -0.2  /  SaO2: 99                vanco through     RADIOLOGY & ADDITIONAL STUDIES:    < from: Xray Chest 1 View- PORTABLE-Routine (12.02.18 @ 07:00) >  EXAM:  XR CHEST PORTABLE ROUTINE 1V                                  PROCEDURE DATE:  12/02/2018          INTERPRETATION:  CLINICAL INFORMATION: Cough. Pneumonia.    EXAM: AP view of chest performed on 12/2/2018 at 6:26 AM    COMPARISON: AP view ofthe chest from 12/1/2018.    FINDINGS:  Interval removal of endotracheal tube. Nasogastric tube is again   visualized with tip below the GE junction, likely in the stomach.  Stable right lower lobe airspace opacity. New blunting of the left   costophrenic angle likely due to a small pleural effusion.  Left-sided dual-lead cardiac device again seen over the upper chest wall   with distal leads intact. The cardiomediastinal silhouette is magnified   on the projection.  Status post median sternotomy. Intact sternotomy wires. There are no   acute osseous findings.    IMPRESSION:  Extubated. Stable right lower lobe pneumonia. New small left pleural   effusion.                  FREDDIE MONTAÑO M.D., RADIOLOGIST    < end of copied text >    CRITICAL CARE TIME SPENT:

## 2018-12-03 NOTE — PHYSICAL THERAPY INITIAL EVALUATION ADULT - GENERAL OBSERVATIONS, REHAB EVAL
IV, nasogastric feeding tube, z-flex boots, SCD, cardiac monitor IV, nasogastric feeding tube, z-flex gillian, SCD, cardiac monitor, chaves

## 2018-12-03 NOTE — PHYSICAL THERAPY INITIAL EVALUATION ADULT - PERTINENT HX OF CURRENT PROBLEM, REHAB EVAL
Pt nonverbal, pt wife stated she noticed that he is not responding, and he was breathing fast with audible wheezing, so she brought him to the hospital, no cough, fever, or chills at home, also denies vomiting, no BM over the past 4 days.

## 2018-12-03 NOTE — PHYSICAL THERAPY INITIAL EVALUATION ADULT - RANGE OF MOTION EXAMINATION, REHAB EVAL
deficits as listed below/pt able to actively move feet and hands, but unable to move arms and legs without assist from PT. Pt with decreased cognition and could not follow commands well

## 2018-12-03 NOTE — PROGRESS NOTE ADULT - ASSESSMENT
77 y/o M with PMH of DM, Dyslipidemia, CAD s/p CABG, PPM, Parkinson's Disease with Dementia, Hypothyroidism and BPH presented with respiratory distress.   Acute respiratory failure due to pneumonia, ?early ARDS.    Sepsis due to pneumonia with high lactate--improved.  Parkinsonism with dementia  DM with hi glu   Extubated, doing well. Pneumonia resolving.  Poor mental status.

## 2018-12-03 NOTE — PROGRESS NOTE ADULT - SUBJECTIVE AND OBJECTIVE BOX
INTERVAL HPI/OVERNIGHT EVENTS:  extubated  arousable    MEDICATIONS  (STANDING):  ALBUTerol/ipratropium for Nebulization 3 milliLiter(s) Nebulizer every 6 hours  aspirin 325 milliGRAM(s) Enteral Tube daily  atorvastatin 40 milliGRAM(s) Oral at bedtime  carbidopa/levodopa CR 25/100 1.5 Tablet(s) Oral two times a day  clopidogrel Tablet 75 milliGRAM(s) Oral daily  dextrose 5%. 1000 milliLiter(s) (50 mL/Hr) IV Continuous <Continuous>  dextrose 50% Injectable 12.5 Gram(s) IV Push once  dextrose 50% Injectable 25 Gram(s) IV Push once  dextrose 50% Injectable 25 Gram(s) IV Push once  ertapenem  IVPB      ertapenem  IVPB 1000 milliGRAM(s) IV Intermittent every 24 hours  heparin  Injectable 5000 Unit(s) SubCutaneous every 12 hours  insulin glargine Injectable (LANTUS) 6 Unit(s) SubCutaneous at bedtime  insulin lispro (HumaLOG) corrective regimen sliding scale   SubCutaneous every 6 hours  levothyroxine 125 MICROGram(s) Enteral Tube daily  pantoprazole   Suspension 40 milliGRAM(s) Enteral Tube daily    MEDICATIONS  (PRN):  acetaminophen    Suspension .. 650 milliGRAM(s) Enteral Tube every 6 hours PRN Temp greater or equal to 38.5C (101.3F)  dextrose 40% Gel 15 Gram(s) Oral once PRN Blood Glucose LESS THAN 70 milliGRAM(s)/deciliter  glucagon  Injectable 1 milliGRAM(s) IntraMuscular once PRN Glucose LESS THAN 70 milligrams/deciliter      Allergies    No Known Allergies    Intolerances        Review of Systems:    General:  No wt loss, fevers, chills, night sweats,fatigue,   Eyes:  Good vision, no reported pain  ENT:  No sore throat, pain, runny nose, dysphagia  CV:  No pain, palpitatioins, hypo/hypertension  Resp:  No dyspnea, cough, tachypnea, wheezing  GI:  No pain, No nausea, No vomiting, No diarrhea, No constipatiion, No weight loss, No fever, No pruritis, No rectal bleeding, No tarry stools, No dysphagia,  :  No pain, bleeding, incontinence, nocturia  Muscle:  No pain, weakness  Neuro:  No weakness, tingling, memory problems  Psych:  No fatigue, insomnia, mood problems, depression  Endocrine:  No polyuria, polydypsia, cold/heat intolerance  Heme:  No petechiae, ecchymosis, easy bruisability  Skin:  No rash, tattoos, scars, edema      Vital Signs Last 24 Hrs  T(C): 36.7 (03 Dec 2018 12:46), Max: 37 (02 Dec 2018 20:01)  T(F): 98 (03 Dec 2018 12:46), Max: 98.6 (02 Dec 2018 20:01)  HR: 60 (03 Dec 2018 13:04) (60 - 70)  BP: 152/75 (03 Dec 2018 10:22) (106/51 - 163/68)  BP(mean): 99 (03 Dec 2018 10:00) (67 - 99)  RR: 29 (03 Dec 2018 10:00) (21 - 32)  SpO2: 100% (03 Dec 2018 13:04) (92% - 100%)    PHYSICAL EXAM:    Constitutional: NAD, well-developed  HEENT: EOMI, throat clear  Neck: No LAD, supple  Respiratory: CTA and P  Cardiovascular: S1 and S2, RRR, no M  Gastrointestinal: BS+, soft, NT/ND, neg HSM,  Extremities: No peripheral edema, neg clubing, cyanosis  Vascular: 2+ peripheral pulses  Neurological: A/O x 3, no focal deficits  Psychiatric: Normal mood, normal affect  Skin: No rashes      LABS:                        10.9   11.55 )-----------( 421      ( 03 Dec 2018 06:46 )             32.9     12-03    134<L>  |  100  |  15  ----------------------------<  263<H>  3.8   |  28  |  0.93    Ca    8.4      03 Dec 2018 06:46  Phos  4.0     12-03  Mg     2.0     12-03    TPro  6.0  /  Alb  1.8<L>  /  TBili  0.4  /  DBili  x   /  AST  44<H>  /  ALT  54  /  AlkPhos  143<H>  12-03    PT/INR - ( 03 Dec 2018 06:46 )   PT: 13.0 sec;   INR: 1.19 ratio               RADIOLOGY & ADDITIONAL TESTS:

## 2018-12-03 NOTE — PHYSICAL THERAPY INITIAL EVALUATION ADULT - ADDITIONAL COMMENTS
According to pt wife, pt lives in 1 story home no AUREA. Pt has wheelchair, walker, cane. About a month ago pt wife needed to start helping pt more.

## 2018-12-03 NOTE — PROGRESS NOTE ADULT - SUBJECTIVE AND OBJECTIVE BOX
Patient is a 78y old  Male who presents with a chief complaint of Respiratory distress. (03 Dec 2018 09:30)      INTERVAL History of Present Illness/OVERNIGHT EVENTS: await swallow eval.    MEDICATIONS  (STANDING):  ALBUTerol/ipratropium for Nebulization 3 milliLiter(s) Nebulizer every 6 hours  aspirin 325 milliGRAM(s) Enteral Tube daily  atorvastatin 40 milliGRAM(s) Oral at bedtime  carbidopa/levodopa CR 25/100 1.5 Tablet(s) Oral two times a day  clopidogrel Tablet 75 milliGRAM(s) Oral daily  dextrose 5%. 1000 milliLiter(s) (50 mL/Hr) IV Continuous <Continuous>  dextrose 50% Injectable 12.5 Gram(s) IV Push once  dextrose 50% Injectable 25 Gram(s) IV Push once  dextrose 50% Injectable 25 Gram(s) IV Push once  ertapenem  IVPB      ertapenem  IVPB 1000 milliGRAM(s) IV Intermittent every 24 hours  heparin  Injectable 5000 Unit(s) SubCutaneous every 12 hours  insulin glargine Injectable (LANTUS) 6 Unit(s) SubCutaneous at bedtime  insulin lispro (HumaLOG) corrective regimen sliding scale   SubCutaneous every 6 hours  levothyroxine 125 MICROGram(s) Enteral Tube daily  pantoprazole   Suspension 40 milliGRAM(s) Enteral Tube daily    MEDICATIONS  (PRN):  acetaminophen    Suspension .. 650 milliGRAM(s) Enteral Tube every 6 hours PRN Temp greater or equal to 38.5C (101.3F)  dextrose 40% Gel 15 Gram(s) Oral once PRN Blood Glucose LESS THAN 70 milliGRAM(s)/deciliter  glucagon  Injectable 1 milliGRAM(s) IntraMuscular once PRN Glucose LESS THAN 70 milligrams/deciliter      Allergies    No Known Allergies    Intolerances        REVIEW OF SYSTEMS:  Negative unless otherwise specified above.    Vital Signs Last 24 Hrs  T(C): 36.7 (03 Dec 2018 08:27), Max: 37 (02 Dec 2018 20:01)  T(F): 98.1 (03 Dec 2018 08:27), Max: 98.6 (02 Dec 2018 20:01)  HR: 69 (03 Dec 2018 10:22) (60 - 70)  BP: 152/75 (03 Dec 2018 10:22) (106/51 - 163/68)  BP(mean): 99 (03 Dec 2018 10:00) (67 - 99)  RR: 29 (03 Dec 2018 10:00) (21 - 32)  SpO2: 92% (03 Dec 2018 10:22) (92% - 100%)        PHYSICAL EXAM:  GENERAL: No apparent distress  HEAD:  Atraumatic, Normocephalic  EYES: Conjunctiva and sclera clear  ENMT: NGT  NECK: Supple  CHEST/LUNG: Clear to auscultation bilaterally  HEART: Regular rate and rhythm  ABDOMEN: Soft, Nontender, Nondistended; supra-pubic catheter  EXTREMITIES:  No clubbing, cyanosis or edema  SKIN: No rashes or lesions  NERVOUS SYSTEM:  Aphasic at baseline      LABS:                        10.9   11.55 )-----------( 421      ( 03 Dec 2018 06:46 )             32.9     03 Dec 2018 06:46    134    |  100    |  15     ----------------------------<  263    3.8     |  28     |  0.93     Ca    8.4        03 Dec 2018 06:46  Phos  4.0       03 Dec 2018 06:46  Mg     2.0       03 Dec 2018 06:46    TPro  6.0    /  Alb  1.8    /  TBili  0.4    /  DBili  x      /  AST  44     /  ALT  54     /  AlkPhos  143    03 Dec 2018 06:46    PT/INR - ( 03 Dec 2018 06:46 )   PT: 13.0 sec;   INR: 1.19 ratio             CAPILLARY BLOOD GLUCOSE      POCT Blood Glucose.: 206 mg/dL (03 Dec 2018 11:24)  POCT Blood Glucose.: 267 mg/dL (03 Dec 2018 06:08)  POCT Blood Glucose.: 230 mg/dL (02 Dec 2018 23:15)  POCT Blood Glucose.: 249 mg/dL (02 Dec 2018 17:15)      RADIOLOGY & ADDITIONAL TESTS:    Images reviewed personally    Consultant Notes Reviewed and Care Discussed with relevant Consultants/Other Providers.

## 2018-12-03 NOTE — PROCEDURE NOTE - ADDITIONAL PROCEDURE DETAILS
!4 Arabic punch cystostomy placed after inability to place [place] with bard urological kit and the lack of availability of a felxible cystoscope in the hospital.
patient pulled out NG tube. new one placed for feeds and PO meds as patient failed speech eval today.    procedure independent of documented critical care time
NG tube was placed after successful tracheal intubation to empty stomach to reduce risk of aspiration  and keep on low intermit wall suction

## 2018-12-03 NOTE — PROGRESS NOTE ADULT - ASSESSMENT
77 y/o M with PMH of DM, Dyslipidemia, CAD s/p CABG, PPM, Parkinson's Disease with Dementia, Hypothyroidism and BPH presented with respiratory distress.     1. Acute respiratory failure/Sepsis/PNA  -S/P intubation - now extubated  Possibly secondary to CAP VS.  Aspiration PNA  -Continue with  current antibiotic as per ID  -RSV panel negative.  Blood culture NGTD.  -Further care as per pulmonary.    2.  Acute renal failure With urinary retention.  Possibly due to ATN VS. Dehydration in the setting of infection.  -Suprapubic cath  was inserted as per urology  -Continue with IVF  -Avoid nephrotoxin  -nephrology to follow up      3. Coagulopathy. improving    4.  DM2  -Continue with ISS, and Monitor POC  -Hold oral medications    5. HX of CAD.  Continue with ASA, and Metoprolol    6.  Abnormal trop level  -Seems to be secondary to demand mismatch ischemia in the setting of sepsis, and infection  likely due to demand ischemia in setting of renal insufficiency and sepsis.    7.  Parkinson's disease.  Will resume home medications  -Patient has been on puree diet  -Son/Dr. Grider and spouse refuses Peg tube placement  swallow eval    8. sputum + klebsilla and ESBL e coli-   - antibiotic change to INVANZ.    9. Elevated LFTs- resolving    10. DVT proph heparin    Prognosis is poor.  quality of life is limited  if family refuse PEG and patient not eating, will consider HOSPICE  d/w wife at bedside - wants [facility]  spent 37 mins

## 2018-12-03 NOTE — SWALLOW BEDSIDE ASSESSMENT ADULT - ORAL PHASE
Delayed oral transit time/Decreased anterior-posterior movement of the bolus Delayed oral transit time/Within functional limits/Decreased anterior-posterior movement of the bolus

## 2018-12-03 NOTE — PROCEDURE NOTE - NSPOSTPRCRAD_GEN_A_CORE
post-procedure radiography performed/below diaphragm and in stomach
NG tube confirmed in distal stomach/post-procedure radiography performed

## 2018-12-03 NOTE — PROCEDURE NOTE - NSINFORMCONSENT_GEN_A_CORE
Benefits, risks, and possible complications of procedure explained to patient/caregiver who verbalized understanding and gave written consent.
This was an emergent procedure.
This was an emergent procedure.

## 2018-12-03 NOTE — PROGRESS NOTE ADULT - ATTENDING COMMENTS
Management plan was discussed with patient's wife at the bedside, she understands & agrees. Patient is full code.  Transfer to     Reviewed all notes, xrays, labs  Prognosis guarded for eventual recovery

## 2018-12-04 DIAGNOSIS — G20 PARKINSON'S DISEASE: ICD-10-CM

## 2018-12-04 DIAGNOSIS — J69.0 PNEUMONITIS DUE TO INHALATION OF FOOD AND VOMIT: ICD-10-CM

## 2018-12-04 DIAGNOSIS — F41.9 ANXIETY DISORDER, UNSPECIFIED: ICD-10-CM

## 2018-12-04 DIAGNOSIS — R13.10 DYSPHAGIA, UNSPECIFIED: ICD-10-CM

## 2018-12-04 DIAGNOSIS — E03.9 HYPOTHYROIDISM, UNSPECIFIED: ICD-10-CM

## 2018-12-04 DIAGNOSIS — E11.8 TYPE 2 DIABETES MELLITUS WITH UNSPECIFIED COMPLICATIONS: ICD-10-CM

## 2018-12-04 LAB
ALBUMIN SERPL ELPH-MCNC: 1.9 G/DL — LOW (ref 3.3–5)
ALP SERPL-CCNC: 118 U/L — SIGNIFICANT CHANGE UP (ref 30–120)
ALT FLD-CCNC: 10 U/L DA — SIGNIFICANT CHANGE UP (ref 10–60)
ANION GAP SERPL CALC-SCNC: 6 MMOL/L — SIGNIFICANT CHANGE UP (ref 5–17)
AST SERPL-CCNC: 37 U/L — SIGNIFICANT CHANGE UP (ref 10–40)
BILIRUB SERPL-MCNC: 0.6 MG/DL — SIGNIFICANT CHANGE UP (ref 0.2–1.2)
BUN SERPL-MCNC: 14 MG/DL — SIGNIFICANT CHANGE UP (ref 7–23)
CALCIUM SERPL-MCNC: 7.9 MG/DL — LOW (ref 8.4–10.5)
CHLORIDE SERPL-SCNC: 100 MMOL/L — SIGNIFICANT CHANGE UP (ref 96–108)
CO2 SERPL-SCNC: 30 MMOL/L — SIGNIFICANT CHANGE UP (ref 22–31)
CREAT SERPL-MCNC: 0.83 MG/DL — SIGNIFICANT CHANGE UP (ref 0.5–1.3)
GLUCOSE SERPL-MCNC: 213 MG/DL — HIGH (ref 70–99)
HCT VFR BLD CALC: 30.6 % — LOW (ref 39–50)
HGB BLD-MCNC: 10 G/DL — LOW (ref 13–17)
MCHC RBC-ENTMCNC: 27.1 PG — SIGNIFICANT CHANGE UP (ref 27–34)
MCHC RBC-ENTMCNC: 32.7 GM/DL — SIGNIFICANT CHANGE UP (ref 32–36)
MCV RBC AUTO: 82.9 FL — SIGNIFICANT CHANGE UP (ref 80–100)
NRBC # BLD: 0 /100 WBCS — SIGNIFICANT CHANGE UP (ref 0–0)
PLATELET # BLD AUTO: 460 K/UL — HIGH (ref 150–400)
POTASSIUM SERPL-MCNC: 4.3 MMOL/L — SIGNIFICANT CHANGE UP (ref 3.5–5.3)
POTASSIUM SERPL-SCNC: 4.3 MMOL/L — SIGNIFICANT CHANGE UP (ref 3.5–5.3)
PROT SERPL-MCNC: 5.6 G/DL — LOW (ref 6–8.3)
RBC # BLD: 3.69 M/UL — LOW (ref 4.2–5.8)
RBC # FLD: 14.6 % — HIGH (ref 10.3–14.5)
SODIUM SERPL-SCNC: 136 MMOL/L — SIGNIFICANT CHANGE UP (ref 135–145)
WBC # BLD: 13.05 K/UL — HIGH (ref 3.8–10.5)
WBC # FLD AUTO: 13.05 K/UL — HIGH (ref 3.8–10.5)

## 2018-12-04 PROCEDURE — 71045 X-RAY EXAM CHEST 1 VIEW: CPT | Mod: 26

## 2018-12-04 RX ORDER — BACITRACIN ZINC 500 UNIT/G
1 OINTMENT IN PACKET (EA) TOPICAL ONCE
Qty: 0 | Refills: 0 | Status: COMPLETED | OUTPATIENT
Start: 2018-12-04 | End: 2018-12-04

## 2018-12-04 RX ORDER — INSULIN GLARGINE 100 [IU]/ML
10 INJECTION, SOLUTION SUBCUTANEOUS AT BEDTIME
Qty: 0 | Refills: 0 | Status: DISCONTINUED | OUTPATIENT
Start: 2018-12-04 | End: 2018-12-07

## 2018-12-04 RX ORDER — QUETIAPINE FUMARATE 200 MG/1
25 TABLET, FILM COATED ORAL ONCE
Qty: 0 | Refills: 0 | Status: COMPLETED | OUTPATIENT
Start: 2018-12-04 | End: 2018-12-04

## 2018-12-04 RX ADMIN — CARBIDOPA AND LEVODOPA 1 TABLET(S): 25; 100 TABLET ORAL at 10:38

## 2018-12-04 RX ADMIN — Medication 325 MILLIGRAM(S): at 11:24

## 2018-12-04 RX ADMIN — Medication 4: at 07:04

## 2018-12-04 RX ADMIN — HEPARIN SODIUM 5000 UNIT(S): 5000 INJECTION INTRAVENOUS; SUBCUTANEOUS at 05:30

## 2018-12-04 RX ADMIN — Medication 3 MILLILITER(S): at 08:18

## 2018-12-04 RX ADMIN — Medication 1 APPLICATION(S): at 21:48

## 2018-12-04 RX ADMIN — CARBIDOPA AND LEVODOPA 1 TABLET(S): 25; 100 TABLET ORAL at 17:51

## 2018-12-04 RX ADMIN — CARBIDOPA AND LEVODOPA 1 TABLET(S): 25; 100 TABLET ORAL at 05:30

## 2018-12-04 RX ADMIN — CARBIDOPA AND LEVODOPA 1 TABLET(S): 25; 100 TABLET ORAL at 21:47

## 2018-12-04 RX ADMIN — QUETIAPINE FUMARATE 25 MILLIGRAM(S): 200 TABLET, FILM COATED ORAL at 23:07

## 2018-12-04 RX ADMIN — Medication 2: at 23:22

## 2018-12-04 RX ADMIN — Medication 4: at 17:51

## 2018-12-04 RX ADMIN — Medication 125 MICROGRAM(S): at 05:30

## 2018-12-04 RX ADMIN — CARBIDOPA AND LEVODOPA 1 TABLET(S): 25; 100 TABLET ORAL at 15:02

## 2018-12-04 RX ADMIN — CLOPIDOGREL BISULFATE 75 MILLIGRAM(S): 75 TABLET, FILM COATED ORAL at 11:24

## 2018-12-04 RX ADMIN — TAMSULOSIN HYDROCHLORIDE 0.4 MILLIGRAM(S): 0.4 CAPSULE ORAL at 21:48

## 2018-12-04 RX ADMIN — Medication 3 MILLILITER(S): at 19:23

## 2018-12-04 RX ADMIN — ATORVASTATIN CALCIUM 40 MILLIGRAM(S): 80 TABLET, FILM COATED ORAL at 21:48

## 2018-12-04 RX ADMIN — PANTOPRAZOLE SODIUM 40 MILLIGRAM(S): 20 TABLET, DELAYED RELEASE ORAL at 11:24

## 2018-12-04 RX ADMIN — INSULIN GLARGINE 10 UNIT(S): 100 INJECTION, SOLUTION SUBCUTANEOUS at 21:48

## 2018-12-04 RX ADMIN — ERTAPENEM SODIUM 120 MILLIGRAM(S): 1 INJECTION, POWDER, LYOPHILIZED, FOR SOLUTION INTRAMUSCULAR; INTRAVENOUS at 08:53

## 2018-12-04 RX ADMIN — Medication 3 MILLILITER(S): at 14:03

## 2018-12-04 RX ADMIN — Medication 4: at 13:23

## 2018-12-04 NOTE — PROGRESS NOTE ADULT - SUBJECTIVE AND OBJECTIVE BOX
Patient is a 78y old  Male who presents with a chief complaint of Respiratory distress. (04 Dec 2018 11:31)    HPI:  This is an 79 y/o M with PMH of DM, Dyslipidemia, CAD s/p CABG, PPM, Parkinson's Disease with Dementia, Hypothyroidism and BPH who presented with respiratory distress. As per patient's wife at the bedside he was at his baseline condition till 4 days ago when she noticed that he closes his mouth & doesn't allow any thing to be put in his mouth, and she has to use a dripper to feed him. Yesterday she noticed that he is not responding, and today he was breathing fast with audible wheezing, so she brought him to the hospital, no cough, fever, or chills at home, also denies vomiting, no BM over the past 4 days. Wife stated also that patient "lost his voice" a month ago because of his parkinson's disease, but was able to understand and interact non verbally, till yesterday. (25 Nov 2018 23:07)    INTERVAL HPI/OVERNIGHT EVENTS:  Chart reviewed, notes reviewed.   Patient seen and examined.  Being followed by following specialists: ID, Pulm/CC, Cardiology, GI.     Consultant(s) Notes Reviewed:  [X] Yes    Care Discussed with Consultants/Other Providers: [X] Yes    12/08/18 -->     REVIEW OF SYSTEMS:      Allergies: No Known Allergies    Intolerances    MEDICATIONS  (STANDING):  ALBUTerol/ipratropium for Nebulization 3 milliLiter(s) Nebulizer every 6 hours  aspirin 325 milliGRAM(s) Enteral Tube daily  atorvastatin 40 milliGRAM(s) Oral at bedtime  carbidopa/levodopa  25/100 1 Tablet(s) Oral <User Schedule>  clopidogrel Tablet 75 milliGRAM(s) Oral daily  dextrose 5%. 1000 milliLiter(s) (50 mL/Hr) IV Continuous <Continuous>  dextrose 50% Injectable 12.5 Gram(s) IV Push once  dextrose 50% Injectable 25 Gram(s) IV Push once  dextrose 50% Injectable 25 Gram(s) IV Push once  ertapenem  IVPB      ertapenem  IVPB 1000 milliGRAM(s) IV Intermittent every 24 hours  heparin  Injectable 5000 Unit(s) SubCutaneous every 12 hours  insulin glargine Injectable (LANTUS) 10 Unit(s) SubCutaneous at bedtime  insulin lispro (HumaLOG) corrective regimen sliding scale   SubCutaneous every 6 hours  levothyroxine 125 MICROGram(s) Enteral Tube daily  pantoprazole   Suspension 40 milliGRAM(s) Enteral Tube daily  tamsulosin 0.4 milliGRAM(s) Oral at bedtime    MEDICATIONS  (PRN):  acetaminophen    Suspension .. 650 milliGRAM(s) Enteral Tube every 6 hours PRN Temp greater or equal to 38.5C (101.3F)  dextrose 40% Gel 15 Gram(s) Oral once PRN Blood Glucose LESS THAN 70 milliGRAM(s)/deciliter  glucagon  Injectable 1 milliGRAM(s) IntraMuscular once PRN Glucose LESS THAN 70 milligrams/deciliter    Vital Signs Last 24 Hrs  T(C): 36.9 (04 Dec 2018 12:52), Max: 37 (03 Dec 2018 16:01)  T(F): 98.5 (04 Dec 2018 12:52), Max: 98.6 (03 Dec 2018 16:01)  HR: 76 (04 Dec 2018 14:03) (63 - 100)  BP: 156/71 (04 Dec 2018 14:00) (130/80 - 156/71)  BP(mean): 95 (04 Dec 2018 14:00) (83 - 107)  RR: 30 (04 Dec 2018 14:00) (24 - 32)  SpO2: 98% (04 Dec 2018 14:03) (96% - 100%)    PHYSICAL EXAM:  GENERAL: Thin built male in no acute distress.   HEAD:  Atraumatic, Normocephalic  EYES: Pallor +  ENMT: NG tube noted.   NECK: Supple,   CHEST/LUNG: Decreased breath sounds at bases. occasional rhonchi +  HEART: S1, S2, SM +  ABDOMEN: Soft,  Bowel sounds present. Suprapubic Cath noted.   EXTREMITIES:  2+ Peripheral Pulses, No clubbing, cyanosis, or edema  MS: No joint swelling or deformity.   LYMPH: No lymphadenopathy noted  SKIN: No rashes or lesions  NERVOUS SYSTEM:    PSYCH:      LABS:                       10.0   13.05 )-----------( 460      ( 04 Dec 2018 06:58 )             30.6     04 Dec 2018 06:58    136    |  100    |  14     ----------------------------<  213    4.3     |  30     |  0.83     Ca    7.9        04 Dec 2018 06:58  Phos  4.0       03 Dec 2018 06:46  Mg     2.0       03 Dec 2018 06:46    TPro  5.6    /  Alb  1.9    /  TBili  0.6    /  DBili  x      /  AST  37     /  ALT  10     /  AlkPhos  118    04 Dec 2018 06:58    CAPILLARY BLOOD GLUCOSE  POCT Blood Glucose.: 219 mg/dL (04 Dec 2018 13:08)  POCT Blood Glucose.: 246 mg/dL (04 Dec 2018 06:59)  POCT Blood Glucose.: 207 mg/dL (03 Dec 2018 23:08)  POCT Blood Glucose.: 186 mg/dL (03 Dec 2018 16:42)    PT/INR - ( 03 Dec 2018 06:46 )   PT: 13.0 sec;   INR: 1.19 ratio        11-27 ObxindhodsQ0R 8.2    11-26 MzlcbsgpdbW0A 8.5    Procalcitonin, Serum: 4.05 ng/mL (12-01-18 @ 15:52)    RADIOLOGY TEST: (IMAGES REVIEWED BY ME)  < from: Xray Chest 1 View- PORTABLE-Urgent (12.04.18 @ 14:27) >  EXAM:  XR CHEST PORTABLE URGENT 1V                        PROCEDURE DATE:  12/04/2018    INTERPRETATION:  CLINICAL STATEMENT: NG tube.    TECHNIQUE: AP view of the lower chest.    FINDINGS:  NG tube noted with tip under the diaphragm overlying expected region of   stomach.    IMPRESSION:  NG tube as described above.     < end of copied text >    < from: Xray Chest 1 View- PORTABLE-Routine (12.04.18 @ 12:45) >  EXAM:  XR CHEST PORTABLE ROUTINE 1V                        PROCEDURE DATE:  12/04/2018    INTERPRETATION:  CLINICAL STATEMENT: Patient removed NG tube    TECHNIQUE: AP view of the chest.    COMPARISON: 12/3/2018    FINDINGS/  IMPRESSION:  Tip of NG tube overlies proximal esophagus. Repositioning recommended.    Sternotomy wires, left cardiac device again noted. Increased interstitial   lung markings without significant change. Mild opacities right lung base   slightly improving.    No significant pleural effusion.    Curvilinear density overlies lateral right upper lung zone which may be   related to overlying soft tissue/skin fold.    Heart size cannot be accurately assessed in this projection.    Special service nurse notified 12/4/2018 at 12:56 PM    < end of copied text >    Imaging Personally Reviewed:  [X] YES        HEALTH ISSUES - PROBLEM Dx:  Dysphagia: Dysphagia  Elevated troponin: Elevated troponin  Need for prophylactic measure: Need for prophylactic measure  CAD (coronary artery disease): CAD (coronary artery disease)  Coagulopathy: Coagulopathy  AMEYA (acute kidney injury): AMEYA (acute kidney injury)  Acute respiratory failure: Acute respiratory failure  Sepsis: Sepsis  PNA (pneumonia): PNA (pneumonia)  Urinary retention: Urinary retention  Parkinson's disease: Parkinson&#x27;s disease  Hypothyroidism, unspecified type: Hypothyroidism, unspecified type  Diabetes mellitus: Diabetes mellitus  Pneumonia: Pneumonia  Sepsis, due to unspecified organism: Sepsis, due to unspecified organism  Acute respiratory failure with hypoxia: Acute respiratory failure with hypoxia Patient is a 78y old  Male who presents with a chief complaint of Respiratory distress. (04 Dec 2018 11:31)    HPI:  This is an 79 y/o M with PMH of DM, Dyslipidemia, CAD s/p CABG, PPM, Parkinson's Disease with Dementia, Hypothyroidism and BPH who presented with respiratory distress. As per patient's wife at the bedside he was at his baseline condition till 4 days ago when she noticed that he closes his mouth & doesn't allow any thing to be put in his mouth, and she has to use a dripper to feed him. Yesterday she noticed that he is not responding, and today he was breathing fast with audible wheezing, so she brought him to the hospital, no cough, fever, or chills at home, also denies vomiting, no BM over the past 4 days. Wife stated also that patient "lost his voice" a month ago because of his parkinson's disease, but was able to understand and interact non verbally, till yesterday. (25 Nov 2018 23:07)    INTERVAL HPI/OVERNIGHT EVENTS:  Chart reviewed, notes reviewed.   Patient seen and examined.  Being followed by following specialists: ID, Pulm/CC, Cardiology, GI.     Consultant(s) Notes Reviewed:  [X] Yes    Care Discussed with Consultants/Other Providers: [X] Yes    12/08/18 --> Patient admitted to     REVIEW OF SYSTEMS:      Allergies: No Known Allergies    Intolerances    MEDICATIONS  (STANDING):  ALBUTerol/ipratropium for Nebulization 3 milliLiter(s) Nebulizer every 6 hours  aspirin 325 milliGRAM(s) Enteral Tube daily  atorvastatin 40 milliGRAM(s) Oral at bedtime  carbidopa/levodopa  25/100 1 Tablet(s) Oral <User Schedule>  clopidogrel Tablet 75 milliGRAM(s) Oral daily  dextrose 5%. 1000 milliLiter(s) (50 mL/Hr) IV Continuous <Continuous>  dextrose 50% Injectable 12.5 Gram(s) IV Push once  dextrose 50% Injectable 25 Gram(s) IV Push once  dextrose 50% Injectable 25 Gram(s) IV Push once  ertapenem  IVPB      ertapenem  IVPB 1000 milliGRAM(s) IV Intermittent every 24 hours  heparin  Injectable 5000 Unit(s) SubCutaneous every 12 hours  insulin glargine Injectable (LANTUS) 10 Unit(s) SubCutaneous at bedtime  insulin lispro (HumaLOG) corrective regimen sliding scale   SubCutaneous every 6 hours  levothyroxine 125 MICROGram(s) Enteral Tube daily  pantoprazole   Suspension 40 milliGRAM(s) Enteral Tube daily  tamsulosin 0.4 milliGRAM(s) Oral at bedtime    MEDICATIONS  (PRN):  acetaminophen    Suspension .. 650 milliGRAM(s) Enteral Tube every 6 hours PRN Temp greater or equal to 38.5C (101.3F)  dextrose 40% Gel 15 Gram(s) Oral once PRN Blood Glucose LESS THAN 70 milliGRAM(s)/deciliter  glucagon  Injectable 1 milliGRAM(s) IntraMuscular once PRN Glucose LESS THAN 70 milligrams/deciliter    Vital Signs Last 24 Hrs  T(C): 36.9 (04 Dec 2018 12:52), Max: 37 (03 Dec 2018 16:01)  T(F): 98.5 (04 Dec 2018 12:52), Max: 98.6 (03 Dec 2018 16:01)  HR: 76 (04 Dec 2018 14:03) (63 - 100)  BP: 156/71 (04 Dec 2018 14:00) (130/80 - 156/71)  BP(mean): 95 (04 Dec 2018 14:00) (83 - 107)  RR: 30 (04 Dec 2018 14:00) (24 - 32)  SpO2: 98% (04 Dec 2018 14:03) (96% - 100%)    PHYSICAL EXAM:  GENERAL: Thin built male in no acute distress.   HEAD:  Atraumatic, Normocephalic  EYES: Pallor +  ENMT: NG tube noted.   NECK: Supple,   CHEST/LUNG: Decreased breath sounds at bases. occasional rhonchi +  HEART: S1, S2, SM +  ABDOMEN: Soft,  Bowel sounds present. Suprapubic Cath noted.   EXTREMITIES:  2+ Peripheral Pulses, No clubbing, cyanosis, or edema  MS: No joint swelling or deformity.   LYMPH: No lymphadenopathy noted  SKIN: No rashes or lesions  NERVOUS SYSTEM:    PSYCH:      LABS:                       10.0   13.05 )-----------( 460      ( 04 Dec 2018 06:58 )             30.6     04 Dec 2018 06:58    136    |  100    |  14     ----------------------------<  213    4.3     |  30     |  0.83     Ca    7.9        04 Dec 2018 06:58  Phos  4.0       03 Dec 2018 06:46  Mg     2.0       03 Dec 2018 06:46    TPro  5.6    /  Alb  1.9    /  TBili  0.6    /  DBili  x      /  AST  37     /  ALT  10     /  AlkPhos  118    04 Dec 2018 06:58    CAPILLARY BLOOD GLUCOSE  POCT Blood Glucose.: 219 mg/dL (04 Dec 2018 13:08)  POCT Blood Glucose.: 246 mg/dL (04 Dec 2018 06:59)  POCT Blood Glucose.: 207 mg/dL (03 Dec 2018 23:08)  POCT Blood Glucose.: 186 mg/dL (03 Dec 2018 16:42)    PT/INR - ( 03 Dec 2018 06:46 )   PT: 13.0 sec;   INR: 1.19 ratio        11-27 [id number] 8.2    11-26 RrlzunsofhB2W 8.5    Procalcitonin, Serum: 4.05 ng/mL (12-01-18 @ 15:52)    RADIOLOGY TEST: (IMAGES REVIEWED BY ME)  < from: Xray Chest 1 View- PORTABLE-Urgent (12.04.18 @ 14:27) >  EXAM:  XR CHEST PORTABLE URGENT 1V                        PROCEDURE DATE:  12/04/2018    INTERPRETATION:  CLINICAL STATEMENT: NG tube.    TECHNIQUE: AP view of the lower chest.    FINDINGS:  NG tube noted with tip under the diaphragm overlying expected region of   stomach.    IMPRESSION:  NG tube as described above.     < end of copied text >    < from: Xray Chest 1 View- PORTABLE-Routine (12.04.18 @ 12:45) >  EXAM:  XR CHEST PORTABLE ROUTINE 1V                        PROCEDURE DATE:  12/04/2018    INTERPRETATION:  CLINICAL STATEMENT: Patient removed NG tube    TECHNIQUE: AP view of the chest.    COMPARISON: 12/3/2018    FINDINGS/  IMPRESSION:  Tip of NG tube overlies proximal esophagus. Repositioning recommended.    Sternotomy wires, left cardiac device again noted. Increased interstitial   lung markings without significant change. Mild opacities right lung base   slightly improving.    No significant pleural effusion.    Curvilinear density overlies lateral right upper lung zone which may be   related to overlying soft tissue/skin fold.    Heart size cannot be accurately assessed in this projection.    Special service nurse notified 12/4/2018 at 12:56 PM    < end of copied text >    Imaging Personally Reviewed:  [X] YES        HEALTH ISSUES - PROBLEM Dx:  Dysphagia: Dysphagia  Elevated troponin: Elevated troponin  Need for prophylactic measure: Need for prophylactic measure  CAD (coronary artery disease): CAD (coronary artery disease)  Coagulopathy: Coagulopathy  AMEYA (acute kidney injury): AMEYA (acute kidney injury)  Acute respiratory failure: Acute respiratory failure  Sepsis: Sepsis  PNA (pneumonia): PNA (pneumonia)  Urinary retention: Urinary retention  Parkinson's disease: Parkinson&#x27;s disease  Hypothyroidism, unspecified type: Hypothyroidism, unspecified type  Diabetes mellitus: Diabetes mellitus  Pneumonia: Pneumonia  Sepsis, due to unspecified organism: Sepsis, due to unspecified organism  Acute respiratory failure with hypoxia: Acute respiratory failure with hypoxia Patient is a 78y old  Male who presents with a chief complaint of Respiratory distress. (04 Dec 2018 11:31)    HPI:  This is an 77 y/o M with PMH of DM, Dyslipidemia, CAD s/p CABG, PPM, Parkinson's Disease with Dementia, Hypothyroidism and BPH who presented with respiratory distress. As per patient's wife at the bedside he was at his baseline condition till 4 days ago when she noticed that he closes his mouth & doesn't allow any thing to be put in his mouth, and she has to use a dripper to feed him. Yesterday she noticed that he is not responding, and today he was breathing fast with audible wheezing, so she brought him to the hospital, no cough, fever, or chills at home, also denies vomiting, no BM over the past 4 days. Wife stated also that patient "lost his voice" a month ago because of his parkinson's disease, but was able to understand and interact non verbally, till yesterday. (25 Nov 2018 23:07)    INTERVAL HPI/OVERNIGHT EVENTS:  Chart reviewed, notes reviewed.   Patient seen and examined.  Being followed by following specialists: ID, Pulm/CC, Cardiology, GI.     Consultant(s) Notes Reviewed:  [X] Yes    Care Discussed with Consultants/Other Providers: [X] Yes    12/08/18 --> Patient admitted to hospital with sepsis, septic shock, respiratory failure, pneumonia, AMEYA and urinary retention. Patient slowly improved. He also was found to have UTI due to ESBL. Patient was extubated and was downgraded.  He is transferred to my service. Patient remains minimally responsive and non verbal. He failed his speech and swallow eval. He is confused and pulls out the tubes and lines. He pulled out his NG tube last night and it had to be replaced. Patient again pulled out NG tube partially. Son is at bedside.     REVIEW OF SYSTEMS: Unable to obtain ROS as patient is non verbal and not able to provide any history. It is as per HPI, rest is negative.      Allergies: No Known Allergies    Intolerances    MEDICATIONS  (STANDING):  ALBUTerol/ipratropium for Nebulization 3 milliLiter(s) Nebulizer every 6 hours  aspirin 325 milliGRAM(s) Enteral Tube daily  atorvastatin 40 milliGRAM(s) Oral at bedtime  carbidopa/levodopa  25/100 1 Tablet(s) Oral <User Schedule>  clopidogrel Tablet 75 milliGRAM(s) Oral daily  dextrose 5%. 1000 milliLiter(s) (50 mL/Hr) IV Continuous <Continuous>  dextrose 50% Injectable 12.5 Gram(s) IV Push once  dextrose 50% Injectable 25 Gram(s) IV Push once  dextrose 50% Injectable 25 Gram(s) IV Push once  ertapenem  IVPB      ertapenem  IVPB 1000 milliGRAM(s) IV Intermittent every 24 hours  heparin  Injectable 5000 Unit(s) SubCutaneous every 12 hours  insulin glargine Injectable (LANTUS) 10 Unit(s) SubCutaneous at bedtime  insulin lispro (HumaLOG) corrective regimen sliding scale   SubCutaneous every 6 hours  levothyroxine 125 MICROGram(s) Enteral Tube daily  pantoprazole   Suspension 40 milliGRAM(s) Enteral Tube daily  tamsulosin 0.4 milliGRAM(s) Oral at bedtime    MEDICATIONS  (PRN):  acetaminophen    Suspension .. 650 milliGRAM(s) Enteral Tube every 6 hours PRN Temp greater or equal to 38.5C (101.3F)  dextrose 40% Gel 15 Gram(s) Oral once PRN Blood Glucose LESS THAN 70 milliGRAM(s)/deciliter  glucagon  Injectable 1 milliGRAM(s) IntraMuscular once PRN Glucose LESS THAN 70 milligrams/deciliter    Vital Signs Last 24 Hrs  T(C): 36.9 (04 Dec 2018 12:52), Max: 37 (03 Dec 2018 16:01)  T(F): 98.5 (04 Dec 2018 12:52), Max: 98.6 (03 Dec 2018 16:01)  HR: 76 (04 Dec 2018 14:03) (63 - 100)  BP: 156/71 (04 Dec 2018 14:00) (130/80 - 156/71)  BP(mean): 95 (04 Dec 2018 14:00) (83 - 107)  RR: 30 (04 Dec 2018 14:00) (24 - 32)  SpO2: 98% (04 Dec 2018 14:03) (96% - 100%)    PHYSICAL EXAM:  GENERAL: Thin built male in no acute distress.   HEAD:  Atraumatic, Normocephalic  EYES: Pallor +  ENMT: NG tube noted.   NECK: Supple,   CHEST/LUNG: Decreased breath sounds at bases. occasional rhonchi +  HEART: S1, S2, SM +  ABDOMEN: Soft,  Bowel sounds present. Suprapubic Cath noted.   EXTREMITIES:  2+ Peripheral Pulses, No clubbing, cyanosis, or edema  MS: No joint swelling or deformity.   LYMPH: No lymphadenopathy noted  SKIN: No rashes or lesions  NERVOUS SYSTEM: minimally responsive.   PSYCH: Non verbal.     LABS:                       10.0   13.05 )-----------( 460      ( 04 Dec 2018 06:58 )             30.6     04 Dec 2018 06:58    136    |  100    |  14     ----------------------------<  213    4.3     |  30     |  0.83     Ca    7.9        04 Dec 2018 06:58  Phos  4.0       03 Dec 2018 06:46  Mg     2.0       03 Dec 2018 06:46    TPro  5.6    /  Alb  1.9    /  TBili  0.6    /  DBili  x      /  AST  37     /  ALT  10     /  AlkPhos  118    04 Dec 2018 06:58    CAPILLARY BLOOD GLUCOSE  POCT Blood Glucose.: 219 mg/dL (04 Dec 2018 13:08)  POCT Blood Glucose.: 246 mg/dL (04 Dec 2018 06:59)  POCT Blood Glucose.: 207 mg/dL (03 Dec 2018 23:08)  POCT Blood Glucose.: 186 mg/dL (03 Dec 2018 16:42)    PT/INR - ( 03 Dec 2018 06:46 )   PT: 13.0 sec;   INR: 1.19 ratio        11-27 [id number] 8.2    11-26 LoovloogfhK5A 8.5    Procalcitonin, Serum: 4.05 ng/mL (12-01-18 @ 15:52)    RADIOLOGY TEST: (IMAGES REVIEWED BY ME)  < from: Xray Chest 1 View- PORTABLE-Urgent (12.04.18 @ 14:27) >  EXAM:  XR CHEST PORTABLE URGENT 1V                        PROCEDURE DATE:  12/04/2018    INTERPRETATION:  CLINICAL STATEMENT: NG tube.    TECHNIQUE: AP view of the lower chest.    FINDINGS:  NG tube noted with tip under the diaphragm overlying expected region of   stomach.    IMPRESSION:  NG tube as described above.     < end of copied text >    < from: Xray Chest 1 View- PORTABLE-Routine (12.04.18 @ 12:45) >  EXAM:  XR CHEST PORTABLE ROUTINE 1V                        PROCEDURE DATE:  12/04/2018    INTERPRETATION:  CLINICAL STATEMENT: Patient removed NG tube    TECHNIQUE: AP view of the chest.    COMPARISON: 12/3/2018    FINDINGS/  IMPRESSION:  Tip of NG tube overlies proximal esophagus. Repositioning recommended.    Sternotomy wires, left cardiac device again noted. Increased interstitial   lung markings without significant change. Mild opacities right lung base   slightly improving.    No significant pleural effusion.    Curvilinear density overlies lateral right upper lung zone which may be   related to overlying soft tissue/skin fold.    Heart size cannot be accurately assessed in this projection.    Special service nurse notified 12/4/2018 at 12:56 PM    < end of copied text >    Imaging Personally Reviewed:  [X] YES        HEALTH ISSUES - PROBLEM Dx:  Dysphagia: Dysphagia  Elevated troponin: Elevated troponin  Need for prophylactic measure: Need for prophylactic measure  CAD (coronary artery disease): CAD (coronary artery disease)  Coagulopathy: Coagulopathy  AMEYA (acute kidney injury): AMEYA (acute kidney injury)  Acute respiratory failure: Acute respiratory failure  Sepsis: Sepsis  PNA (pneumonia): PNA (pneumonia)  Urinary retention: Urinary retention  Parkinson's disease: Parkinson&#x27;s disease  Hypothyroidism, unspecified type: Hypothyroidism, unspecified type  Diabetes mellitus: Diabetes mellitus  Pneumonia: Pneumonia  Sepsis, due to unspecified organism: Sepsis, due to unspecified organism  Acute respiratory failure with hypoxia: Acute respiratory failure with hypoxia

## 2018-12-04 NOTE — PROGRESS NOTE ADULT - ATTENDING COMMENTS
Patient needs bilateral wrist restrains to prevent him pulling out his NG tube.     NG tube was repositioned by me and follow up x-ray showed good positioning. Resume NG tube.    Discussed with patient's son in detail. Family requesting Hospice Eval for possible Hospice at home.     Prognosis very poor.

## 2018-12-04 NOTE — PROGRESS NOTE ADULT - SUBJECTIVE AND OBJECTIVE BOX
ID Progress note     Name: SHON HAMEED  Age: 78y  Gender: Male  MRN: [id number]    Interval History-- Events noted, very restless trying to pull NGT out . Afebrile . Failed speech evaluation , family want re-evaluation today   Notes reviewed    Past Medical History--  Type 2 diabetes mellitus  Dyslipidemia  Hypothyroidism  Parkinson's disease  Diabetes mellitus  CAD (coronary artery disease) of artery bypass graft  Benign essential hypertension  Adult hypothyroidism  Acquired hypothyroidism  S/P CABG (coronary artery bypass graft)  Pacemaker      For details regarding the patient's social history, family history, and other miscellaneous elements, please refer the initial infectious diseases consultation and/or the admitting history and physical examination for this admission.    Allergies--  Allergies    No Known Allergies    Intolerances        Medications--  Antibiotics:  ertapenem  IVPB      ertapenem  IVPB 1000 milliGRAM(s) IV Intermittent every 24 hours    Immunologic:    Other:  acetaminophen    Suspension .. PRN  ALBUTerol/ipratropium for Nebulization  aspirin  atorvastatin  carbidopa/levodopa  25/100  clopidogrel Tablet  dextrose 40% Gel PRN  dextrose 5%.  dextrose 50% Injectable  dextrose 50% Injectable  dextrose 50% Injectable  glucagon  Injectable PRN  heparin  Injectable  insulin glargine Injectable (LANTUS)  insulin lispro (HumaLOG) corrective regimen sliding scale  levothyroxine  pantoprazole   Suspension  tamsulosin      Review of Systems--  Review of systems unable to be obtained secondary to clinical condition.    Physical Examination--    Vital Signs: T(F): 97.5 (12-04-18 @ 07:27), Max: 98.6 (12-03-18 @ 16:01)  HR: 100 (12-04-18 @ 08:18)  BP: 141/79 (12-04-18 @ 08:00)  RR: 32 (12-04-18 @ 08:00)  SpO2: 99% (12-04-18 @ 08:18)  Wt(kg): --  General: Nontoxic-appearing agitated Male in no acute distress.  HEENT: AT/NC. PERRL. EOMI. Anicteric. Conjunctiva pink and moist. Oropharynx clear. Dentition fair.  Neck: Not rigid. No sense of mass.  Nodes: None palpable.  Lungs: Clear bilaterally without rales, wheezing or rhonchi  Heart: Regular rate and rhythm. No Murmur. No rub. No gallop. No palpable thrill.  Abdomen: Bowel sounds present and normoactive. Soft. Nondistended. Nontender. No sense of mass. No organomegaly.  Back: No spinal tenderness. No costovertebral angle tenderness.   Extremities: No cyanosis or clubbing. No edema.   Skin: Warm. Dry. Good turgor. No rash. No vasculitic stigmata.  Psychiatric: Appropriate affect and mood for situation.         Laboratory Studies--  CBC                        10.0   13.05 )-----------( 460      ( 04 Dec 2018 06:58 )             30.6       Chemistries  12-04    136  |  100  |  14  ----------------------------<  213<H>  4.3   |  30  |  0.83    Ca    7.9<L>      04 Dec 2018 06:58  Phos  4.0     12-03  Mg     2.0     12-03    TPro  5.6<L>  /  Alb  1.9<L>  /  TBili  0.6  /  DBili  x   /  AST  37  /  ALT  10  /  AlkPhos  118  12-04      Culture Data    Culture - Sputum (collected 29 Nov 2018 11:18)  Source: .Sputum Sputum/TRAP  Gram Stain (29 Nov 2018 13:43):    No polymorphonuclear leukocytes per low power field    No Squamous epithelial cells per low power field    No organisms seen per oil power field  Final Report (01 Dec 2018 09:26):    Moderate Klebsiella pneumoniae    Moderate Escherichia coli ESBL    Normal Respiratory Beatriz absent  Organism: Klebsiella pneumoniae  Escherichia coli ESBL (01 Dec 2018 09:26)  Organism: Escherichia coli ESBL (01 Dec 2018 09:26)  Organism: Klebsiella pneumoniae (01 Dec 2018 09:26)    Culture - Urine (collected 28 Nov 2018 10:34)  Source: .Urine Catheterized  Final Report (29 Nov 2018 11:24):    No growth    Xray Chest 1 View- PORTABLE-Routine (12.02.18 @ 07:00) >  Interval removal of endotracheal tube. Nasogastric tube is again   visualized with tip below the GE junction, likely in the stomach.  Stable right lower lobe airspace opacity. New blunting of the left   costophrenic angle likely due to a small pleural effusion.  Left-sided dual-lead cardiac device again seen over the upper chest wall   with distal leads intact. The cardiomediastinal silhouette is magnified   on the projection.  Status post median sternotomy. Intact sternotomy wires. There are no   acute osseous findings.    IMPRESSION:  Extubated. Stable right lower lobe pneumonia. New small left pleural   effusion.    Assessment--  79 y/o M with PMH of DM, Dyslipidemia, CAD s/p CABG, PPM, Parkinson's Disease with Dementia, Hypothyroidism and BPH who presented with respiratory distress.  Has acute hypoxic respiratory failure secondary to  pneumonia likely aspiration as he has parkinson's diseases. Could have severe CAP too He was febrile and hypoxic , likely has severe sepsis with septic shock. He has been not on any pressors . Based on his presentation he will likely have positive blood cs   He likely has Gram negative pneumonia secondary to aspiration . Doubt its legionella., his urine legionella antigen is negative     he has poorly controlled DM    Sputum cs shows Klebsiella and ESBL E coli     he is s/p extubation , doing well,     Plan:  - will continue with  IV Invanz 1 gram daily as he has ESBL E coli  in sputum , complete invanx after dose 12/5/18   - s/p extubation yesterday  , family may consider DNI   - Await speech therapy evaluation , most likely will fail and  may need PEG , family likely to refuse PEG but want to continue with NGT feeds till seen by speech therapy   - serial CXR  - overall prognosis poor    Continue with present regime .  Appropriate use of antibiotics and adverse effects reviewed.      I have discussed the above plan of care with patient's family in detail. They expressed understanding of the treatment plan . Risks, benefits and alternatives discussed in detail. I have asked if they have any questions or concerns and appropriately addressed them to the best of my ability .      > 35 minutes spent in direct patient care reviewing  the notes, lab data/ imaging , discussion with multidisciplinary team. All questions were addressed and answered to the best of my capacity .    Thank you for allowing me to participate in the care of your patient .        Stu Cage MD  [phone]

## 2018-12-04 NOTE — PROGRESS NOTE ADULT - PROBLEM SELECTOR PLAN 3
FS are running slightly high.   Will increase Lantus to 10 units in AM and Humalog coverage.   Hypoglycemia protocol.

## 2018-12-04 NOTE — PROGRESS NOTE ADULT - ASSESSMENT
79 y/o male with pmhx of parkinsons disease, hypothyroidism, HLD, CAD s/p CABG, PPM, urinary retention s/p suprapubic catheter who was admitted on 11/25 with respiratory distress and hypoxia requiring intubation. Patient found to be septic secondary to pneumonia (likely aspiration). As per patient's wife, he had been declining over the last month and can no longer talk and has difficulty swallowing. Patient was never on pressors. Extubated on 12/1.      Attempted to have goals of care discussion with patients wife, though she would like to wait until tomorrow when both sons are present.

## 2018-12-04 NOTE — PROGRESS NOTE ADULT - SUBJECTIVE AND OBJECTIVE BOX
Patient is a 78y old  Male who presents with a chief complaint of Respiratory distress. (27 Nov 2018 08:03)      Patient seen in follow up for AMEYA. On tube feeds. Family at bedside.     PAST MEDICAL HISTORY:  Dyslipidemia  Hypothyroidism  Parkinson's disease  Diabetes mellitus  CAD (coronary artery disease) of artery bypass graft  Benign essential hypertension  Adult hypothyroidism  Acquired hypothyroidism    MEDICATIONS  (STANDING):  ALBUTerol/ipratropium for Nebulization 3 milliLiter(s) Nebulizer every 6 hours  aspirin 325 milliGRAM(s) Enteral Tube daily  atorvastatin 40 milliGRAM(s) Oral at bedtime  carbidopa/levodopa  25/100 1 Tablet(s) Oral <User Schedule>  clopidogrel Tablet 75 milliGRAM(s) Oral daily  dextrose 5%. 1000 milliLiter(s) (50 mL/Hr) IV Continuous <Continuous>  dextrose 50% Injectable 12.5 Gram(s) IV Push once  dextrose 50% Injectable 25 Gram(s) IV Push once  dextrose 50% Injectable 25 Gram(s) IV Push once  ertapenem  IVPB      ertapenem  IVPB 1000 milliGRAM(s) IV Intermittent every 24 hours  heparin  Injectable 5000 Unit(s) SubCutaneous every 12 hours  insulin glargine Injectable (LANTUS) 6 Unit(s) SubCutaneous at bedtime  insulin lispro (HumaLOG) corrective regimen sliding scale   SubCutaneous every 6 hours  levothyroxine 125 MICROGram(s) Enteral Tube daily  pantoprazole   Suspension 40 milliGRAM(s) Enteral Tube daily  tamsulosin 0.4 milliGRAM(s) Oral at bedtime    MEDICATIONS  (PRN):  acetaminophen    Suspension .. 650 milliGRAM(s) Enteral Tube every 6 hours PRN Temp greater or equal to 38.5C (101.3F)  dextrose 40% Gel 15 Gram(s) Oral once PRN Blood Glucose LESS THAN 70 milliGRAM(s)/deciliter  glucagon  Injectable 1 milliGRAM(s) IntraMuscular once PRN Glucose LESS THAN 70 milligrams/deciliter    T(C): 36.4 (12-04-18 @ 07:27), Max: 37 (12-02-18 @ 20:01)  HR: 73 (12-04-18 @ 11:21) (60 - 100)  BP: 140/75 (12-04-18 @ 11:21) (106/51 - 163/72)  RR: 32 (12-04-18 @ 08:00)  SpO2: 99% (12-04-18 @ 11:21)  Wt(kg): --  I&O's Detail    03 Dec 2018 07:01  -  04 Dec 2018 07:00  --------------------------------------------------------  IN:    Enteral Tube Flush: 575 mL    Glucerna: 1380 mL    IV PiggyBack: 50 mL  Total IN: 2005 mL    OUT:    Indwelling Catheter - Suprapubic: 900 mL  Total OUT: 900 mL    Total NET: 1105 mL      04 Dec 2018 07:01  -  04 Dec 2018 11:32  --------------------------------------------------------  IN:    Enteral Tube Flush: 75 mL    Glucerna: 180 mL  Total IN: 255 mL    OUT:  Total OUT: 0 mL    Total NET: 255 mL      PHYSICAL EXAM:  General: + NGT  Respiratory: b/l air entry  Cardiovascular: S1 S2  Gastrointestinal: soft, supra pubic catheter  Extremities:  no edema                   LABORATORY:                        10.0   13.05 )-----------( 460      ( 04 Dec 2018 06:58 )             30.6     12-04    136  |  100  |  14  ----------------------------<  213<H>  4.3   |  30  |  0.83    Ca    7.9<L>      04 Dec 2018 06:58  Phos  4.0     12-03  Mg     2.0     12-03    TPro  5.6<L>  /  Alb  1.9<L>  /  TBili  0.6  /  DBili  x   /  AST  37  /  ALT  10  /  AlkPhos  118  12-04    Sodium, Serum: 136 mmol/L (12-04 @ 06:58)  Sodium, Serum: 134 mmol/L (12-03 @ 06:46)    Potassium, Serum: 4.3 mmol/L (12-04 @ 06:58)  Potassium, Serum: 3.8 mmol/L (12-03 @ 06:46)    Hemoglobin: 10.0 g/dL (12-04 @ 06:58)  Hemoglobin: 10.9 g/dL (12-03 @ 06:46)  Hemoglobin: 10.6 g/dL (12-02 @ 06:56)    Creatinine, Serum 0.83 (12-04 @ 06:58)  Creatinine, Serum 0.93 (12-03 @ 06:46)  Creatinine, Serum 0.89 (12-02 @ 06:56)        LIVER FUNCTIONS - ( 04 Dec 2018 06:58 )  Alb: 1.9 g/dL / Pro: 5.6 g/dL / ALK PHOS: 118 U/L / ALT: 10 U/L DA / AST: 37 U/L / GGT: x

## 2018-12-04 NOTE — PROGRESS NOTE ADULT - SUBJECTIVE AND OBJECTIVE BOX
SHON HAMEED [facility] S [phone]   ALLERGY nka   ADMITTING MD Dr Chloe Abdalla  CONTACT Sheree P [phone] Son Jalen P  [phone]  REASON FOR VISIT    Subsequent followup (Cov Dr Vogel)  Initial evaluation/Pulmonary Critical Care consultation done by Dr Vogel   Patient examined chart reviewed  HOSPITAL ADMISSION [facility]  S 11/25/2018  PATIENT CAME  FROM (if information available)        VITALS/LABS      12/4/2018 afeb 80 71 140/70 99%   12/4/2018 W 13 Hb 10 Plt 460 Na 136 K 4.3 CO2 30 Cr .8     REVIEW OF SYMPTOMS    Able to give ROS  NO     PHYSICAL EXAM    HEENT Unremarkable PERRLA atraumatic   RESP Fair air entry EXP prolonged    Harsh breath sound Resp distres mild   CARDIAC S1 S2 No S3     NO JVD    ABDOMEN SOFT BS PRESENT NOT DISTENDED No hepatosplenomegaly PEDAL EDEMA present No calf tenderness  NO rash   GENERAL Not TOXIC looking    EVENTS        12/1/2018 Ertapenem was started 11/30/2018 as 11/29 c showed ESBL E coli and Klebs On 11/30 [place] was placed Extubated 12/1 11/30/2018 Remains vent dep Fam to call me re whetherto reintubated after extubation or not if they change decvision At preseent the son who is hospitalist told me yesterday am that pt is to be reintubated if needed     GLOBAL ISSUE/BEST PRACTICE:      PROBLEM: HOB elevation:   y            PROBLEM: Stress ulcer proph:    protonix 40 (11/27)           PROBLEM Infection ppx Chlorhexidien .12 (11/27)              PROBLEM: VTE prophylaxis:      hsc (11/29)   PROBLEM: Glycemic control:    lantus (11/28)   PROBLEM: Nutrition:    Glucerna 1.2 960 (11/26) (ng)   PROBLEM: Advanced directive: na     PROBLEM: Allergies:  na  PROCEDURE [place] 11/30/2018     PATIENT DESCRIPTION PATIENT SHON HAMEED 11/25/2018 ADMISSION [facility] S  CHLOE ABDALLA MD      73 m PMH DM, Dyslipidemia, CAD s/p CABG, PPM, Parkinson admitted 11/25 with respiratory distress, acute hypoxic respiratory failure secondary to  aspiration pneumonia (parkinsons) with septic shock  Patient was extubated 12/1 needed ngt becauses of dysphagia and was Rxed for ESBL E coli pneumonia       PRESENTING PROBLEMS 11/25/2018   Ac hypoxic resp failure  Septic shock  Asp pneum  Urine retn    ASSESSMENT/RECOMMENDATIONS PATIENT SHON HAMEED 11/25/2018 ADMISSION [facility] CHLOE LOZANO MD           SHOCK RESOLVED Off  vasopressors   PNEUMONIA On  Ertapenem (11/30) (MDR gnb pn)   VENT Extubated 12/1 12/1/2018 5/5/.3 744/34/115   CAD HO CABG On Plavix 75  (11/27)  (11/27) Atorvastat 40 (11/26)   AMEYA Resolved Nonoliguric    DYSPHAGIA 12/4/2018 Await speech swallow reeval     TIME SPENT Over 35 minutes aggregate critical care time spent on encounter; activities included   direct patient care, counseling and/or coordinating care reviewing notes, lab data/ imaging , discussion with multidisciplinary team/ patient  /family. Risks, benefits, alternatives  discussed in detail.

## 2018-12-04 NOTE — PROGRESS NOTE ADULT - SUBJECTIVE AND OBJECTIVE BOX
Patient is a 78y old  Male who presents with a chief complaint of Respiratory distress. (04 Dec 2018 21:32)      BRIEF HOSPITAL COURSE: ***    Events last 24 hours: ***    PAST MEDICAL & SURGICAL HISTORY:  Type 2 diabetes mellitus  Dyslipidemia  Hypothyroidism  Parkinson's disease  CAD (coronary artery disease) of artery bypass graft  Benign essential hypertension  S/P CABG (coronary artery bypass graft)  Pacemaker      Review of Systems:  CONSTITUTIONAL: No fever, chills, or fatigue  EYES: No eye pain, visual disturbances, or discharge  ENMT:  No difficulty hearing, tinnitus, vertigo; No sinus or throat pain  NECK: No pain or stiffness  RESPIRATORY: No cough, wheezing, chills or hemoptysis; No shortness of breath  CARDIOVASCULAR: No chest pain, palpitations, dizziness, or leg swelling  GASTROINTESTINAL: No abdominal or epigastric pain. No nausea, vomiting, or hematemesis; No diarrhea or constipation. No melena or hematochezia.  GENITOURINARY: No dysuria, frequency, hematuria, or incontinence  NEUROLOGICAL: No headaches, memory loss, loss of strength, numbness, or tremors  SKIN: No itching, burning, rashes, or lesions   MUSCULOSKELETAL: No joint pain or swelling; No muscle, back, or extremity pain  PSYCHIATRIC: No depression, anxiety, mood swings, or difficulty sleeping      Medications:  ertapenem  IVPB      ertapenem  IVPB 1000 milliGRAM(s) IV Intermittent every 24 hours    tamsulosin 0.4 milliGRAM(s) Oral at bedtime    ALBUTerol/ipratropium for Nebulization 3 milliLiter(s) Nebulizer every 6 hours    acetaminophen    Suspension .. 650 milliGRAM(s) Enteral Tube every 6 hours PRN  carbidopa/levodopa  25/100 1 Tablet(s) Oral <User Schedule>  QUEtiapine 25 milliGRAM(s) Oral once      clopidogrel Tablet 75 milliGRAM(s) Oral daily    pantoprazole   Suspension 40 milliGRAM(s) Enteral Tube daily      atorvastatin 40 milliGRAM(s) Oral at bedtime  dextrose 40% Gel 15 Gram(s) Oral once PRN  dextrose 50% Injectable 12.5 Gram(s) IV Push once  dextrose 50% Injectable 25 Gram(s) IV Push once  dextrose 50% Injectable 25 Gram(s) IV Push once  glucagon  Injectable 1 milliGRAM(s) IntraMuscular once PRN  insulin glargine Injectable (LANTUS) 10 Unit(s) SubCutaneous at bedtime  insulin lispro (HumaLOG) corrective regimen sliding scale   SubCutaneous every 6 hours  levothyroxine 125 MICROGram(s) Enteral Tube daily    dextrose 5%. 1000 milliLiter(s) IV Continuous <Continuous>                ICU Vital Signs Last 24 Hrs  T(C): 36.7 (04 Dec 2018 20:02), Max: 36.9 (04 Dec 2018 12:52)  T(F): 98.1 (04 Dec 2018 20:02), Max: 98.5 (04 Dec 2018 12:52)  HR: 71 (04 Dec 2018 22:00) (65 - 100)  BP: 138/71 (04 Dec 2018 22:00) (121/64 - 156/71)  BP(mean): 91 (04 Dec 2018 22:00) (80 - 107)  ABP: --  ABP(mean): --  RR: 32 (04 Dec 2018 22:00) (20 - 39)  SpO2: 99% (04 Dec 2018 22:00) (95% - 100%)          I&O's Detail    03 Dec 2018 07:01  -  04 Dec 2018 07:00  --------------------------------------------------------  IN:    Enteral Tube Flush: 575 mL    Glucerna: 1380 mL    IV PiggyBack: 50 mL  Total IN: 2005 mL    OUT:    Indwelling Catheter - Suprapubic: 900 mL  Total OUT: 900 mL    Total NET: 1105 mL      04 Dec 2018 07:01  -  04 Dec 2018 22:55  --------------------------------------------------------  IN:    Enteral Tube Flush: 225 mL    Glucerna: 540 mL  Total IN: 765 mL    OUT:    Indwelling Catheter - Suprapubic: 500 mL  Total OUT: 500 mL    Total NET: 265 mL            LABS:                        10.0   13.05 )-----------( 460      ( 04 Dec 2018 06:58 )             30.6     12-04    136  |  100  |  14  ----------------------------<  213<H>  4.3   |  30  |  0.83    Ca    7.9<L>      04 Dec 2018 06:58  Phos  4.0     12-03  Mg     2.0     12-03    TPro  5.6<L>  /  Alb  1.9<L>  /  TBili  0.6  /  DBili  x   /  AST  37  /  ALT  10  /  AlkPhos  118  12-04          CAPILLARY BLOOD GLUCOSE      POCT Blood Glucose.: 202 mg/dL (04 Dec 2018 17:44)    PT/INR - ( 03 Dec 2018 06:46 )   PT: 13.0 sec;   INR: 1.19 ratio             CULTURES:  Culture Results:   Moderate Klebsiella pneumoniae  Moderate Escherichia coli ESBL  Normal Respiratory Beatriz absent (11-29 @ 11:18)  Culture Results:   No growth (11-28 @ 10:34)      Physical Examination:    General: No acute distress.      HEENT: Pupils equal, reactive to light.  Symmetric.    PULM: Clear to auscultation bilaterally, no significant sputum production    NECK: Supple, no lymphadenopathy, trachea midline    CVS: Regular rate and rhythm, no murmurs, rubs, or gallops    ABD: Soft, nondistended, nontender, normoactive bowel sounds, no masses    EXT: No edema, nontender    SKIN: Warm and well perfused, no rashes noted.    NEURO: Alert, oriented, interactive, nonfocal    DEVICES:     RADIOLOGY: ***    CRITICAL CARE TIME SPENT: *** Patient is a 78y old  Male who presents with a chief complaint of Respiratory distress. (04 Dec 2018 21:32)      BRIEF HOSPITAL COURSE: 77 y/o male with pmhx of parkinsons disease, hypothyroidism, HLD, CAD s/p CABG, PPM, urinary retention s/p suprapubic catheter who was admitted on 11/25 with respiratory distress and hypoxia requiring intubation. Patient found to be septic secondary to pneumonia (likely aspiration). As per patient's wife, he had been declining over the last month and can no longer talk and has difficulty swallowing. Patient was never on pressors. Extubated on 12/1.    Events last 24 hours: failed speech and swallow, family opting not to place PEG, but would like another speech and swallow eval prior to discharge. Anxious appearing when family leaves.    PAST MEDICAL & SURGICAL HISTORY:  Type 2 diabetes mellitus  Dyslipidemia  Hypothyroidism  Parkinson's disease  CAD (coronary artery disease) of artery bypass graft  Benign essential hypertension  S/P CABG (coronary artery bypass graft)  Pacemaker      Review of Systems:  unable to obtain due to patient being nonverbal with poor mental status      Medications:  ertapenem  IVPB      ertapenem  IVPB 1000 milliGRAM(s) IV Intermittent every 24 hours    tamsulosin 0.4 milliGRAM(s) Oral at bedtime    ALBUTerol/ipratropium for Nebulization 3 milliLiter(s) Nebulizer every 6 hours    acetaminophen    Suspension .. 650 milliGRAM(s) Enteral Tube every 6 hours PRN  carbidopa/levodopa  25/100 1 Tablet(s) Oral <User Schedule>  QUEtiapine 25 milliGRAM(s) Oral once      clopidogrel Tablet 75 milliGRAM(s) Oral daily    pantoprazole   Suspension 40 milliGRAM(s) Enteral Tube daily      atorvastatin 40 milliGRAM(s) Oral at bedtime  dextrose 40% Gel 15 Gram(s) Oral once PRN  dextrose 50% Injectable 12.5 Gram(s) IV Push once  dextrose 50% Injectable 25 Gram(s) IV Push once  dextrose 50% Injectable 25 Gram(s) IV Push once  glucagon  Injectable 1 milliGRAM(s) IntraMuscular once PRN  insulin glargine Injectable (LANTUS) 10 Unit(s) SubCutaneous at bedtime  insulin lispro (HumaLOG) corrective regimen sliding scale   SubCutaneous every 6 hours  levothyroxine 125 MICROGram(s) Enteral Tube daily    dextrose 5%. 1000 milliLiter(s) IV Continuous <Continuous>                ICU Vital Signs Last 24 Hrs  T(C): 36.7 (04 Dec 2018 20:02), Max: 36.9 (04 Dec 2018 12:52)  T(F): 98.1 (04 Dec 2018 20:02), Max: 98.5 (04 Dec 2018 12:52)  HR: 71 (04 Dec 2018 22:00) (65 - 100)  BP: 138/71 (04 Dec 2018 22:00) (121/64 - 156/71)  BP(mean): 91 (04 Dec 2018 22:00) (80 - 107)  ABP: --  ABP(mean): --  RR: 32 (04 Dec 2018 22:00) (20 - 39)  SpO2: 99% (04 Dec 2018 22:00) (95% - 100%)          I&O's Detail    03 Dec 2018 07:01  -  04 Dec 2018 07:00  --------------------------------------------------------  IN:    Enteral Tube Flush: 575 mL    Glucerna: 1380 mL    IV PiggyBack: 50 mL  Total IN: 2005 mL    OUT:    Indwelling Catheter - Suprapubic: 900 mL  Total OUT: 900 mL    Total NET: 1105 mL      04 Dec 2018 07:01  -  04 Dec 2018 22:55  --------------------------------------------------------  IN:    Enteral Tube Flush: 225 mL    Glucerna: 540 mL  Total IN: 765 mL    OUT:    Indwelling Catheter - Suprapubic: 500 mL  Total OUT: 500 mL    Total NET: 265 mL            LABS:                        10.0   13.05 )-----------( 460      ( 04 Dec 2018 06:58 )             30.6     12-04    136  |  100  |  14  ----------------------------<  213<H>  4.3   |  30  |  0.83    Ca    7.9<L>      04 Dec 2018 06:58  Phos  4.0     12-03  Mg     2.0     12-03    TPro  5.6<L>  /  Alb  1.9<L>  /  TBili  0.6  /  DBili  x   /  AST  37  /  ALT  10  /  AlkPhos  118  12-04          CAPILLARY BLOOD GLUCOSE      POCT Blood Glucose.: 202 mg/dL (04 Dec 2018 17:44)    PT/INR - ( 03 Dec 2018 06:46 )   PT: 13.0 sec;   INR: 1.19 ratio             CULTURES:  Culture Results:   Moderate Klebsiella pneumoniae  Moderate Escherichia coli ESBL  Normal Respiratory Beatriz absent (11-29 @ 11:18)  Culture Results:   No growth (11-28 @ 10:34)      Physical Examination:    General: anxious appearing    HEENT: Pupils equal, reactive to light.  Symmetric.    PULM: Clear to auscultation bilaterally, no significant sputum production    NECK: Supple, no lymphadenopathy, trachea midline    CVS: Regular rate and rhythm, no murmurs, rubs, or gallops    ABD: Soft, nondistended, nontender, normoactive bowel sounds, no masses    EXT: No edema, nontender    SKIN: Warm and well perfused, no rashes noted.    NEURO: limited due to patients neuro status. tracks. squeezes hands, but not able to follow commands.    DEVICES:     RADIOLOGY:     EXAM:  XR CHEST PORTABLE URGENT 1V                                  PROCEDURE DATE:  12/04/2018          INTERPRETATION:  CLINICAL STATEMENT: NG tube.    TECHNIQUE: AP view of the lower chest.    FINDINGS:  NG tube noted with tip under the diaphragm overlying expected region of   stomach.    IMPRESSION:  NG tube as described above.                   INDY DOWNING M.D., ATTENDING RADIOLOGIST  This document has been electronically signed. Dec  4 2018  2:39PM

## 2018-12-04 NOTE — PROGRESS NOTE ADULT - PROBLEM SELECTOR PLAN 5
S/P suprapubic cath placement.   Patient is having some hematuria.   Will hold Heparin, ASA and Plavix.   Irrigate suprapubic cath as needed.

## 2018-12-04 NOTE — PROGRESS NOTE ADULT - ASSESSMENT
This is an 79 y/o M with PMH of DM, Dyslipidemia, CAD s/p CABG, PPM, Parkinson's Disease with Dementia, Hypothyroidism and BPH who presented with respiratory distress. As per patient's wife at the bedside he was at his baseline condition till 4 days ago when she noticed that he closes his mouth & doesn't allow any thing to be put in his mouth, and she has to use a dripper to feed him. Yesterday she noticed that he is not responding, and today he was breathing fast with audible wheezing, so she brought him to the hospital, no cough, fever, or chills at home, also denies vomiting, no BM over the past 4 days. Wife stated also that patient "lost his voice" a month ago because of his parkinson's disease, but was able to understand and interact non verbally, till yesterday. (25 Nov 2018 23:07)

## 2018-12-04 NOTE — PROGRESS NOTE ADULT - PROBLEM SELECTOR PLAN 3
-repeat speech and swallow eval  -family opting against PEG  -continue tube feeds via ng tube, monitor for pressure necrosis.

## 2018-12-04 NOTE — PROGRESS NOTE ADULT - SUBJECTIVE AND OBJECTIVE BOX
INTERVAL HPI/OVERNIGHT EVENTS:  son at bedside  receiving ngt feeds    MEDICATIONS  (STANDING):  ALBUTerol/ipratropium for Nebulization 3 milliLiter(s) Nebulizer every 6 hours  atorvastatin 40 milliGRAM(s) Oral at bedtime  BACItracin   Ointment 1 Application(s) Topical once  carbidopa/levodopa  25/100 1 Tablet(s) Oral <User Schedule>  clopidogrel Tablet 75 milliGRAM(s) Oral daily  dextrose 5%. 1000 milliLiter(s) (50 mL/Hr) IV Continuous <Continuous>  dextrose 50% Injectable 12.5 Gram(s) IV Push once  dextrose 50% Injectable 25 Gram(s) IV Push once  dextrose 50% Injectable 25 Gram(s) IV Push once  ertapenem  IVPB      ertapenem  IVPB 1000 milliGRAM(s) IV Intermittent every 24 hours  insulin glargine Injectable (LANTUS) 10 Unit(s) SubCutaneous at bedtime  insulin lispro (HumaLOG) corrective regimen sliding scale   SubCutaneous every 6 hours  levothyroxine 125 MICROGram(s) Enteral Tube daily  pantoprazole   Suspension 40 milliGRAM(s) Enteral Tube daily  tamsulosin 0.4 milliGRAM(s) Oral at bedtime    MEDICATIONS  (PRN):  acetaminophen    Suspension .. 650 milliGRAM(s) Enteral Tube every 6 hours PRN Temp greater or equal to 38.5C (101.3F)  dextrose 40% Gel 15 Gram(s) Oral once PRN Blood Glucose LESS THAN 70 milliGRAM(s)/deciliter  glucagon  Injectable 1 milliGRAM(s) IntraMuscular once PRN Glucose LESS THAN 70 milligrams/deciliter      Allergies    No Known Allergies    Intolerances        Review of Systems:    unattainable    Vital Signs Last 24 Hrs  T(C): 36.7 (04 Dec 2018 20:02), Max: 36.9 (04 Dec 2018 12:52)  T(F): 98.1 (04 Dec 2018 20:02), Max: 98.5 (04 Dec 2018 12:52)  HR: 74 (04 Dec 2018 20:00) (63 - 100)  BP: 145/69 (04 Dec 2018 20:00) (121/64 - 156/71)  BP(mean): 90 (04 Dec 2018 20:00) (80 - 107)  RR: 33 (04 Dec 2018 20:00) (20 - 39)  SpO2: 100% (04 Dec 2018 20:00) (95% - 100%)    PHYSICAL EXAM:    Constitutional: NAD, well-developed  HEENT: EOMI, throat clear  Neck: No LAD, supple  Respiratory: CTA and P  Cardiovascular: S1 and S2, RRR, no M  Gastrointestinal: BS+, soft, NT/ND, neg HSM,  Extremities: No peripheral edema, neg clubing, cyanosis  Vascular: 2+ peripheral pulses  Neurological: parkinsons  Psychiatric: Normal mood, normal affect  Skin: No rashes      LABS:                        10.0   13.05 )-----------( 460      ( 04 Dec 2018 06:58 )             30.6     12-04    136  |  100  |  14  ----------------------------<  213<H>  4.3   |  30  |  0.83    Ca    7.9<L>      04 Dec 2018 06:58  Phos  4.0     12-03  Mg     2.0     12-03    TPro  5.6<L>  /  Alb  1.9<L>  /  TBili  0.6  /  DBili  x   /  AST  37  /  ALT  10  /  AlkPhos  118  12-04    PT/INR - ( 03 Dec 2018 06:46 )   PT: 13.0 sec;   INR: 1.19 ratio               RADIOLOGY & ADDITIONAL TESTS:

## 2018-12-04 NOTE — PROGRESS NOTE ADULT - ASSESSMENT
·	AMEYA: Prerenal azotemia, ATN  ·	Shock, Sepsis  ·	Respiratory failure on vent   ·	Hypokalemia  ·	Hyponatremia.  ·	Diabetes    Stable renal function. On tube feeds. To continue current meds. IV abx. ID follow up. Monitor blood sugar levels. Insulin coverage as needed. Dietary restriction.   Avoid nephrotoxic meds as possible. Will follow electrolytes and renal function trend. D/w family at bedside.

## 2018-12-05 ENCOUNTER — TRANSCRIPTION ENCOUNTER (OUTPATIENT)
Age: 78
End: 2018-12-05

## 2018-12-05 LAB
ANION GAP SERPL CALC-SCNC: 7 MMOL/L — SIGNIFICANT CHANGE UP (ref 5–17)
BUN SERPL-MCNC: 14 MG/DL — SIGNIFICANT CHANGE UP (ref 7–23)
CALCIUM SERPL-MCNC: 8.3 MG/DL — LOW (ref 8.4–10.5)
CHLORIDE SERPL-SCNC: 100 MMOL/L — SIGNIFICANT CHANGE UP (ref 96–108)
CO2 SERPL-SCNC: 29 MMOL/L — SIGNIFICANT CHANGE UP (ref 22–31)
CREAT SERPL-MCNC: 0.82 MG/DL — SIGNIFICANT CHANGE UP (ref 0.5–1.3)
GLUCOSE SERPL-MCNC: 232 MG/DL — HIGH (ref 70–99)
HCT VFR BLD CALC: 31.6 % — LOW (ref 39–50)
HGB BLD-MCNC: 10.3 G/DL — LOW (ref 13–17)
MCHC RBC-ENTMCNC: 27.2 PG — SIGNIFICANT CHANGE UP (ref 27–34)
MCHC RBC-ENTMCNC: 32.6 GM/DL — SIGNIFICANT CHANGE UP (ref 32–36)
MCV RBC AUTO: 83.6 FL — SIGNIFICANT CHANGE UP (ref 80–100)
NRBC # BLD: 0 /100 WBCS — SIGNIFICANT CHANGE UP (ref 0–0)
PLATELET # BLD AUTO: 514 K/UL — HIGH (ref 150–400)
POTASSIUM SERPL-MCNC: 4.6 MMOL/L — SIGNIFICANT CHANGE UP (ref 3.5–5.3)
POTASSIUM SERPL-SCNC: 4.6 MMOL/L — SIGNIFICANT CHANGE UP (ref 3.5–5.3)
RBC # BLD: 3.78 M/UL — LOW (ref 4.2–5.8)
RBC # FLD: 14.8 % — HIGH (ref 10.3–14.5)
SODIUM SERPL-SCNC: 136 MMOL/L — SIGNIFICANT CHANGE UP (ref 135–145)
WBC # BLD: 10.97 K/UL — HIGH (ref 3.8–10.5)
WBC # FLD AUTO: 10.97 K/UL — HIGH (ref 3.8–10.5)

## 2018-12-05 RX ORDER — HEPARIN SODIUM 5000 [USP'U]/ML
5000 INJECTION INTRAVENOUS; SUBCUTANEOUS EVERY 12 HOURS
Qty: 0 | Refills: 0 | Status: DISCONTINUED | OUTPATIENT
Start: 2018-12-05 | End: 2018-12-07

## 2018-12-05 RX ORDER — ACETAMINOPHEN 500 MG
650 TABLET ORAL EVERY 6 HOURS
Qty: 0 | Refills: 0 | Status: DISCONTINUED | OUTPATIENT
Start: 2018-12-05 | End: 2018-12-07

## 2018-12-05 RX ADMIN — CARBIDOPA AND LEVODOPA 1 TABLET(S): 25; 100 TABLET ORAL at 06:43

## 2018-12-05 RX ADMIN — INSULIN GLARGINE 10 UNIT(S): 100 INJECTION, SOLUTION SUBCUTANEOUS at 22:33

## 2018-12-05 RX ADMIN — Medication 125 MICROGRAM(S): at 06:43

## 2018-12-05 RX ADMIN — CARBIDOPA AND LEVODOPA 1 TABLET(S): 25; 100 TABLET ORAL at 13:32

## 2018-12-05 RX ADMIN — Medication 4: at 12:43

## 2018-12-05 RX ADMIN — Medication 2: at 23:05

## 2018-12-05 RX ADMIN — Medication 3 MILLILITER(S): at 07:12

## 2018-12-05 RX ADMIN — ERTAPENEM SODIUM 120 MILLIGRAM(S): 1 INJECTION, POWDER, LYOPHILIZED, FOR SOLUTION INTRAMUSCULAR; INTRAVENOUS at 08:47

## 2018-12-05 RX ADMIN — CARBIDOPA AND LEVODOPA 1 TABLET(S): 25; 100 TABLET ORAL at 17:46

## 2018-12-05 RX ADMIN — Medication 3 MILLILITER(S): at 19:32

## 2018-12-05 RX ADMIN — Medication 2: at 06:43

## 2018-12-05 RX ADMIN — CARBIDOPA AND LEVODOPA 1 TABLET(S): 25; 100 TABLET ORAL at 09:56

## 2018-12-05 RX ADMIN — ATORVASTATIN CALCIUM 40 MILLIGRAM(S): 80 TABLET, FILM COATED ORAL at 22:32

## 2018-12-05 RX ADMIN — Medication 2: at 17:45

## 2018-12-05 RX ADMIN — PANTOPRAZOLE SODIUM 40 MILLIGRAM(S): 20 TABLET, DELAYED RELEASE ORAL at 12:09

## 2018-12-05 RX ADMIN — Medication 3 MILLILITER(S): at 13:03

## 2018-12-05 RX ADMIN — HEPARIN SODIUM 5000 UNIT(S): 5000 INJECTION INTRAVENOUS; SUBCUTANEOUS at 17:44

## 2018-12-05 RX ADMIN — CARBIDOPA AND LEVODOPA 1 TABLET(S): 25; 100 TABLET ORAL at 23:05

## 2018-12-05 RX ADMIN — TAMSULOSIN HYDROCHLORIDE 0.4 MILLIGRAM(S): 0.4 CAPSULE ORAL at 22:32

## 2018-12-05 NOTE — DISCHARGE NOTE ADULT - MEDICATION SUMMARY - MEDICATIONS TO STOP TAKING
I will STOP taking the medications listed below when I get home from the hospital:    metoprolol succinate 25 mg oral tablet, extended release  -- 1 tab(s) by mouth once a day    Amaryl 2 mg oral tablet  -- 1 tab(s) by mouth once a day    metFORMIN 500 mg oral tablet  -- 1 tab(s) by mouth 2 times a day    amoxicillin 500 mg oral tablet  -- 1 tab(s) by mouth every 8 hours MDD:3 tabs  -- Finish all this medication unless otherwise directed by prescriber.

## 2018-12-05 NOTE — PROGRESS NOTE ADULT - ATTENDING COMMENTS
Patient needs bilateral wrist restrains to prevent him pulling out his NG tube.     NG tube was repositioned by me and follow up x-ray showed good positioning. Resume NG tube.    Discussed with patient's son in detail. Family requesting Hospice Eval for possible Hospice at home.     Prognosis very poor. Patient needs bilateral wrist restrains to prevent him pulling out his NG tube.     Prognosis very poor. For home Hospice.

## 2018-12-05 NOTE — DISCHARGE NOTE ADULT - PLAN OF CARE
Comfort care. Endo of life care NPO  Patient is being discharged home with home hospice for end of life care.   Follow up with Dr. Palla as needed.   Follow up with Dr. Brenner as needed.

## 2018-12-05 NOTE — PROGRESS NOTE ADULT - SUBJECTIVE AND OBJECTIVE BOX
INTERVAL HPI/OVERNIGHT EVENTS:  tolerating ngt feeds  MEDICATIONS  (STANDING):  ALBUTerol/ipratropium for Nebulization 3 milliLiter(s) Nebulizer every 6 hours  atorvastatin 40 milliGRAM(s) Oral at bedtime  carbidopa/levodopa  25/100 1 Tablet(s) Oral <User Schedule>  clopidogrel Tablet 75 milliGRAM(s) Oral daily  dextrose 5%. 1000 milliLiter(s) (50 mL/Hr) IV Continuous <Continuous>  dextrose 50% Injectable 12.5 Gram(s) IV Push once  dextrose 50% Injectable 25 Gram(s) IV Push once  dextrose 50% Injectable 25 Gram(s) IV Push once  heparin  Injectable 5000 Unit(s) SubCutaneous every 12 hours  insulin glargine Injectable (LANTUS) 10 Unit(s) SubCutaneous at bedtime  insulin lispro (HumaLOG) corrective regimen sliding scale   SubCutaneous every 6 hours  levothyroxine 125 MICROGram(s) Enteral Tube daily  pantoprazole   Suspension 40 milliGRAM(s) Enteral Tube daily  tamsulosin 0.4 milliGRAM(s) Oral at bedtime    MEDICATIONS  (PRN):  acetaminophen    Suspension .. 650 milliGRAM(s) Oral every 6 hours PRN Temp greater or equal to 38.5C (101.3F), Mild Pain (1 - 3)  dextrose 40% Gel 15 Gram(s) Oral once PRN Blood Glucose LESS THAN 70 milliGRAM(s)/deciliter  glucagon  Injectable 1 milliGRAM(s) IntraMuscular once PRN Glucose LESS THAN 70 milligrams/deciliter      Allergies    No Known Allergies    Intolerances        Review of Systems:    General:  No wt loss, fevers, chills, night sweats,fatigue,   Eyes:  Good vision, no reported pain  ENT:  No sore throat, pain, runny nose, dysphagia  CV:  No pain, palpitatioins, hypo/hypertension  Resp:  No dyspnea, cough, tachypnea, wheezing  GI:  No pain, No nausea, No vomiting, No diarrhea, No constipatiion, No weight loss, No fever, No pruritis, No rectal bleeding, No tarry stools, No dysphagia,  :  No pain, bleeding, incontinence, nocturia  Muscle:  No pain, weakness  Neuro:  No weakness, tingling, memory problems  Psych:  No fatigue, insomnia, mood problems, depression  Endocrine:  No polyuria, polydypsia, cold/heat intolerance  Heme:  No petechiae, ecchymosis, easy bruisability  Skin:  No rash, tattoos, scars, edema      Vital Signs Last 24 Hrs  T(C): 36.8 (05 Dec 2018 16:05), Max: 37.2 (05 Dec 2018 04:35)  T(F): 98.3 (05 Dec 2018 16:05), Max: 98.9 (05 Dec 2018 04:35)  HR: 69 (05 Dec 2018 18:00) (65 - 86)  BP: 153/68 (05 Dec 2018 18:00) (114/63 - 163/82)  BP(mean): 90 (05 Dec 2018 18:00) (79 - 107)  RR: 31 (05 Dec 2018 18:00) (20 - 42)  SpO2: 97% (05 Dec 2018 18:00) (96% - 100%)    PHYSICAL EXAM:    Constitutional: NAD, well-developed  HEENT: EOMI, throat clear  Neck: No LAD, supple  Respiratory: CTA and P  Cardiovascular: S1 and S2, RRR, no M  Gastrointestinal: BS+, soft, NT/ND, neg HSM,  Extremities: No peripheral edema, neg clubing, cyanosis  Vascular: 2+ peripheral pulses  Neurological: A/O x 3, no focal deficits  Psychiatric: Normal mood, normal affect  Skin: No rashes      LABS:                        10.3   10.97 )-----------( 514      ( 05 Dec 2018 07:01 )             31.6     12-05    136  |  100  |  14  ----------------------------<  232<H>  4.6   |  29  |  0.82    Ca    8.3<L>      05 Dec 2018 07:01    TPro  5.6<L>  /  Alb  1.9<L>  /  TBili  0.6  /  DBili  x   /  AST  37  /  ALT  10  /  AlkPhos  118  12-04          RADIOLOGY & ADDITIONAL TESTS:

## 2018-12-05 NOTE — PROGRESS NOTE ADULT - SUBJECTIVE AND OBJECTIVE BOX
Patient is a 78y old  Male who presents with a chief complaint of Respiratory distress. (04 Dec 2018 11:31)    HPI:  This is an 77 y/o M with PMH of DM, Dyslipidemia, CAD s/p CABG, PPM, Parkinson's Disease with Dementia, Hypothyroidism and BPH who presented with respiratory distress. As per patient's wife at the bedside he was at his baseline condition till 4 days ago when she noticed that he closes his mouth & doesn't allow any thing to be put in his mouth, and she has to use a dripper to feed him. Yesterday she noticed that he is not responding, and today he was breathing fast with audible wheezing, so she brought him to the hospital, no cough, fever, or chills at home, also denies vomiting, no BM over the past 4 days. Wife stated also that patient "lost his voice" a month ago because of his parkinson's disease, but was able to understand and interact non verbally, till yesterday. (25 Nov 2018 23:07)    INTERVAL HPI/OVERNIGHT EVENTS:  Chart reviewed, notes reviewed.   Patient seen and examined.  Being followed by following specialists: ID, Pulm/CC, Cardiology, GI.     Consultant(s) Notes Reviewed:  [X] Yes    Care Discussed with Consultants/Other Providers: [X] Yes    12/04/18 --> Patient admitted to hospital with sepsis, septic shock, respiratory failure, pneumonia, AMEYA and urinary retention. Patient slowly improved. He also was found to have UTI due to ESBL. Patient was extubated and was downgraded.  He is transferred to my service. Patient remains minimally responsive and non verbal. He failed his speech and swallow eval. He is confused and pulls out the tubes and lines. He pulled out his NG tube last night and it had to be replaced. Patient again pulled out NG tube partially. Son is at bedside.     12/05/18 -->     REVIEW OF SYSTEMS: Unable to obtain ROS as patient is non verbal and not able to provide any history. It is as per HPI, rest is negative.      Allergies: No Known Allergies    Intolerances    MEDICATIONS  (STANDING):  ALBUTerol/ipratropium for Nebulization 3 milliLiter(s) Nebulizer every 6 hours  atorvastatin 40 milliGRAM(s) Oral at bedtime  carbidopa/levodopa  25/100 1 Tablet(s) Oral <User Schedule>  clopidogrel Tablet 75 milliGRAM(s) Oral daily  dextrose 5%. 1000 milliLiter(s) (50 mL/Hr) IV Continuous <Continuous>  dextrose 50% Injectable 12.5 Gram(s) IV Push once  dextrose 50% Injectable 25 Gram(s) IV Push once  dextrose 50% Injectable 25 Gram(s) IV Push once  heparin  Injectable 5000 Unit(s) SubCutaneous every 12 hours  insulin glargine Injectable (LANTUS) 10 Unit(s) SubCutaneous at bedtime  insulin lispro (HumaLOG) corrective regimen sliding scale   SubCutaneous every 6 hours  levothyroxine 125 MICROGram(s) Enteral Tube daily  pantoprazole   Suspension 40 milliGRAM(s) Enteral Tube daily  tamsulosin 0.4 milliGRAM(s) Oral at bedtime    MEDICATIONS  (PRN):  acetaminophen    Suspension .. 650 milliGRAM(s) Oral every 6 hours PRN Temp greater or equal to 38.5C (101.3F), Mild Pain (1 - 3)  dextrose 40% Gel 15 Gram(s) Oral once PRN Blood Glucose LESS THAN 70 milliGRAM(s)/deciliter  glucagon  Injectable 1 milliGRAM(s) IntraMuscular once PRN Glucose LESS THAN 70 milligrams/deciliter    Vital Signs Last 24 Hrs  T(C): 36.7 (05 Dec 2018 08:38), Max: 37.2 (05 Dec 2018 04:35)  T(F): 98.1 (05 Dec 2018 08:38), Max: 98.9 (05 Dec 2018 04:35)  HR: 69 (05 Dec 2018 13:04) (65 - 86)  BP: 151/80 (05 Dec 2018 12:00) (114/63 - 163/82)  BP(mean): 101 (05 Dec 2018 12:00) (79 - 107)  RR: 33 (05 Dec 2018 12:00) (20 - 42)  SpO2: 99% (05 Dec 2018 13:04) (95% - 100%)    PHYSICAL EXAM:  GENERAL: Thin built male in no acute distress.   HEAD:  Atraumatic, Normocephalic  EYES: Pallor +  ENMT: NG tube noted.   NECK: Supple,   CHEST/LUNG: Decreased breath sounds at bases. occasional rhonchi +  HEART: S1, S2, SM +  ABDOMEN: Soft,  Bowel sounds present. Suprapubic Cath noted.   EXTREMITIES:  2+ Peripheral Pulses, No clubbing, cyanosis, or edema  MS: No joint swelling or deformity.   LYMPH: No lymphadenopathy noted  SKIN: No rashes or lesions  NERVOUS SYSTEM: minimally responsive.   PSYCH: Non verbal.     LABS:                                             10.3   10.97 )-----------( 514      ( 05 Dec 2018 07:01 )             31.6     05 Dec 2018 07:01    136    |  100    |  14     ----------------------------<  232    4.6     |  29     |  0.82     Ca    8.3        05 Dec 2018 07:01    TPro  5.6    /  Alb  1.9    /  TBili  0.6    /  DBili  x      /  AST  37     /  ALT  10     /  AlkPhos  118    04 Dec 2018 06:58    CAPILLARY BLOOD GLUCOSE  POCT Blood Glucose.: 218 mg/dL (05 Dec 2018 12:15)  POCT Blood Glucose.: 188 mg/dL (05 Dec 2018 06:41)  POCT Blood Glucose.: 196 mg/dL (04 Dec 2018 23:17)  POCT Blood Glucose.: 202 mg/dL (04 Dec 2018 17:44)  POCT Blood Glucose.: 219 mg/dL (04 Dec 2018 13:08)      11-27 [id number] 8.2  11-26 [id number] 8.5    Procalcitonin, Serum: 4.05 ng/mL (12-01-18 @ 15:52)    RADIOLOGY TEST: (IMAGES REVIEWED BY ME)  < from: Xray Chest 1 View- PORTABLE-Urgent (12.04.18 @ 14:27) >  EXAM:  XR CHEST PORTABLE URGENT 1V                        PROCEDURE DATE:  12/04/2018    INTERPRETATION:  CLINICAL STATEMENT: NG tube.    TECHNIQUE: AP view of the lower chest.    FINDINGS:  NG tube noted with tip under the diaphragm overlying expected region of   stomach.    IMPRESSION:  NG tube as described above.     < end of copied text >    < from: Xray Chest 1 View- PORTABLE-Routine (12.04.18 @ 12:45) >  EXAM:  XR CHEST PORTABLE ROUTINE 1V                        PROCEDURE DATE:  12/04/2018    INTERPRETATION:  CLINICAL STATEMENT: Patient removed NG tube    TECHNIQUE: AP view of the chest.    COMPARISON: 12/3/2018    FINDINGS/  IMPRESSION:  Tip of NG tube overlies proximal esophagus. Repositioning recommended.    Sternotomy wires, left cardiac device again noted. Increased interstitial   lung markings without significant change. Mild opacities right lung base   slightly improving.    No significant pleural effusion.    Curvilinear density overlies lateral right upper lung zone which may be   related to overlying soft tissue/skin fold.    Heart size cannot be accurately assessed in this projection.    Special service nurse notified 12/4/2018 at 12:56 PM    < end of copied text >    Imaging Personally Reviewed:  [X] YES        HEALTH ISSUES - PROBLEM Dx:  Dysphagia: Dysphagia  Elevated troponin: Elevated troponin  Need for prophylactic measure: Need for prophylactic measure  CAD (coronary artery disease): CAD (coronary artery disease)  Coagulopathy: Coagulopathy  AMEYA (acute kidney injury): AMEYA (acute kidney injury)  Acute respiratory failure: Acute respiratory failure  Sepsis: Sepsis  PNA (pneumonia): PNA (pneumonia)  Urinary retention: Urinary retention  Parkinson's disease: Parkinson&#x27;s disease  Hypothyroidism, unspecified type: Hypothyroidism, unspecified type  Diabetes mellitus: Diabetes mellitus  Pneumonia: Pneumonia  Sepsis, due to unspecified organism: Sepsis, due to unspecified organism  Acute respiratory failure with hypoxia: Acute respiratory failure with hypoxia Patient is a 78y old  Male who presents with a chief complaint of Respiratory distress. (04 Dec 2018 11:31)    HPI:  This is an 77 y/o M with PMH of DM, Dyslipidemia, CAD s/p CABG, PPM, Parkinson's Disease with Dementia, Hypothyroidism and BPH who presented with respiratory distress. As per patient's wife at the bedside he was at his baseline condition till 4 days ago when she noticed that he closes his mouth & doesn't allow any thing to be put in his mouth, and she has to use a dripper to feed him. Yesterday she noticed that he is not responding, and today he was breathing fast with audible wheezing, so she brought him to the hospital, no cough, fever, or chills at home, also denies vomiting, no BM over the past 4 days. Wife stated also that patient "lost his voice" a month ago because of his parkinson's disease, but was able to understand and interact non verbally, till yesterday. (25 Nov 2018 23:07)    INTERVAL HPI/OVERNIGHT EVENTS:  Chart reviewed, notes reviewed.   Patient seen and examined.  Being followed by following specialists: ID, Pulm/CC, Cardiology, GI.     Consultant(s) Notes Reviewed:  [X] Yes    Care Discussed with Consultants/Other Providers: [X] Yes    12/04/18 --> Patient admitted to hospital with sepsis, septic shock, respiratory failure, pneumonia, AMEYA and urinary retention. Patient slowly improved. He also was found to have UTI due to ESBL. Patient was extubated and was downgraded.  He is transferred to my service. Patient remains minimally responsive and non verbal. He failed his speech and swallow eval. He is confused and pulls out the tubes and lines. He pulled out his NG tube last night and it had to be replaced. Patient again pulled out NG tube partially. Son is at bedside.     12/05/18 --> Remains nonverbal. He gets restless at times and pulls on tubes. Not being able to redirect. Seen by Hospice today and accepted to home hospice program.     REVIEW OF SYSTEMS: Unable to obtain ROS as patient is non verbal and not able to provide any history. It is as per HPI, rest is negative.      Allergies: No Known Allergies    Intolerances    MEDICATIONS  (STANDING):  ALBUTerol/ipratropium for Nebulization 3 milliLiter(s) Nebulizer every 6 hours  atorvastatin 40 milliGRAM(s) Oral at bedtime  carbidopa/levodopa  25/100 1 Tablet(s) Oral <User Schedule>  clopidogrel Tablet 75 milliGRAM(s) Oral daily  dextrose 5%. 1000 milliLiter(s) (50 mL/Hr) IV Continuous <Continuous>  dextrose 50% Injectable 12.5 Gram(s) IV Push once  dextrose 50% Injectable 25 Gram(s) IV Push once  dextrose 50% Injectable 25 Gram(s) IV Push once  heparin  Injectable 5000 Unit(s) SubCutaneous every 12 hours  insulin glargine Injectable (LANTUS) 10 Unit(s) SubCutaneous at bedtime  insulin lispro (HumaLOG) corrective regimen sliding scale   SubCutaneous every 6 hours  levothyroxine 125 MICROGram(s) Enteral Tube daily  pantoprazole   Suspension 40 milliGRAM(s) Enteral Tube daily  tamsulosin 0.4 milliGRAM(s) Oral at bedtime    MEDICATIONS  (PRN):  acetaminophen    Suspension .. 650 milliGRAM(s) Oral every 6 hours PRN Temp greater or equal to 38.5C (101.3F), Mild Pain (1 - 3)  dextrose 40% Gel 15 Gram(s) Oral once PRN Blood Glucose LESS THAN 70 milliGRAM(s)/deciliter  glucagon  Injectable 1 milliGRAM(s) IntraMuscular once PRN Glucose LESS THAN 70 milligrams/deciliter    Vital Signs Last 24 Hrs  T(C): 36.7 (05 Dec 2018 08:38), Max: 37.2 (05 Dec 2018 04:35)  T(F): 98.1 (05 Dec 2018 08:38), Max: 98.9 (05 Dec 2018 04:35)  HR: 69 (05 Dec 2018 13:04) (65 - 86)  BP: 151/80 (05 Dec 2018 12:00) (114/63 - 163/82)  BP(mean): 101 (05 Dec 2018 12:00) (79 - 107)  RR: 33 (05 Dec 2018 12:00) (20 - 42)  SpO2: 99% (05 Dec 2018 13:04) (95% - 100%)    PHYSICAL EXAM:  GENERAL: Thin built male in no acute distress.   HEAD:  Atraumatic, Normocephalic  EYES: Pallor +  ENMT: NG tube noted.   NECK: Supple,   CHEST/LUNG: Decreased breath sounds at bases. occasional rhonchi +  HEART: S1, S2, SM +  ABDOMEN: Soft,  Bowel sounds present. Suprapubic Cath noted.   EXTREMITIES:  2+ Peripheral Pulses, No clubbing, cyanosis, or edema  MS: No joint swelling or deformity.   LYMPH: No lymphadenopathy noted  SKIN: No rashes or lesions  NERVOUS SYSTEM: minimally responsive.   PSYCH: Non verbal.     LABS:                                             10.3   10.97 )-----------( 514      ( 05 Dec 2018 07:01 )             31.6     05 Dec 2018 07:01    136    |  100    |  14     ----------------------------<  232    4.6     |  29     |  0.82     Ca    8.3        05 Dec 2018 07:01    TPro  5.6    /  Alb  1.9    /  TBili  0.6    /  DBili  x      /  AST  37     /  ALT  10     /  AlkPhos  118    04 Dec 2018 06:58    CAPILLARY BLOOD GLUCOSE  POCT Blood Glucose.: 218 mg/dL (05 Dec 2018 12:15)  POCT Blood Glucose.: 188 mg/dL (05 Dec 2018 06:41)  POCT Blood Glucose.: 196 mg/dL (04 Dec 2018 23:17)  POCT Blood Glucose.: 202 mg/dL (04 Dec 2018 17:44)  POCT Blood Glucose.: 219 mg/dL (04 Dec 2018 13:08)      11-27 [id number] 8.2  11-26 [id number] 8.5    Procalcitonin, Serum: 4.05 ng/mL (12-01-18 @ 15:52)    RADIOLOGY TEST: (IMAGES REVIEWED BY ME)  < from: Xray Chest 1 View- PORTABLE-Urgent (12.04.18 @ 14:27) >  EXAM:  XR CHEST PORTABLE URGENT 1V                        PROCEDURE DATE:  12/04/2018    INTERPRETATION:  CLINICAL STATEMENT: NG tube.    TECHNIQUE: AP view of the lower chest.    FINDINGS:  NG tube noted with tip under the diaphragm overlying expected region of   stomach.    IMPRESSION:  NG tube as described above.     < end of copied text >    < from: Xray Chest 1 View- PORTABLE-Routine (12.04.18 @ 12:45) >  EXAM:  XR CHEST PORTABLE ROUTINE 1V                        PROCEDURE DATE:  12/04/2018    INTERPRETATION:  CLINICAL STATEMENT: Patient removed NG tube    TECHNIQUE: AP view of the chest.    COMPARISON: 12/3/2018    FINDINGS/  IMPRESSION:  Tip of NG tube overlies proximal esophagus. Repositioning recommended.    Sternotomy wires, left cardiac device again noted. Increased interstitial   lung markings without significant change. Mild opacities right lung base   slightly improving.    No significant pleural effusion.    Curvilinear density overlies lateral right upper lung zone which may be   related to overlying soft tissue/skin fold.    Heart size cannot be accurately assessed in this projection.    Special service nurse notified 12/4/2018 at 12:56 PM    < end of copied text >    Imaging Personally Reviewed:  [X] YES        HEALTH ISSUES - PROBLEM Dx:  Dysphagia: Dysphagia  Elevated troponin: Elevated troponin  Need for prophylactic measure: Need for prophylactic measure  CAD (coronary artery disease): CAD (coronary artery disease)  Coagulopathy: Coagulopathy  AMEYA (acute kidney injury): AMEYA (acute kidney injury)  Acute respiratory failure: Acute respiratory failure  Sepsis: Sepsis  PNA (pneumonia): PNA (pneumonia)  Urinary retention: Urinary retention  Parkinson's disease: Parkinson&#x27;s disease  Hypothyroidism, unspecified type: Hypothyroidism, unspecified type  Diabetes mellitus: Diabetes mellitus  Pneumonia: Pneumonia  Sepsis, due to unspecified organism: Sepsis, due to unspecified organism  Acute respiratory failure with hypoxia: Acute respiratory failure with hypoxia

## 2018-12-05 NOTE — DISCHARGE NOTE ADULT - MEDICATION SUMMARY - MEDICATIONS TO CHANGE
I will SWITCH the dose or number of times a day I take the medications listed below when I get home from the hospital:    Sinemet 25 mg-100 mg oral tablet, extended release  -- 1.5 tab(s) by mouth 3 times a day    Plavix  --  by mouth once a day

## 2018-12-05 NOTE — PROGRESS NOTE ADULT - PROBLEM SELECTOR PLAN 6
Swallow ankita noted.   Keep patient NPO.   Family leaning towards Hospice care. Swallow eval noted.   Keep patient NPO.   No PEG tube as per family.

## 2018-12-05 NOTE — PROGRESS NOTE ADULT - PROBLEM SELECTOR PLAN 3
FS are running slightly high.   Will increase Lantus to 10 units in AM and Humalog coverage.   Hypoglycemia protocol. FS are improving.   Continue Lantus 10 units in AM and Humalog coverage.   Hypoglycemia protocol.

## 2018-12-05 NOTE — DISCHARGE NOTE ADULT - ADDITIONAL INSTRUCTIONS
HB A1c 8.2% HB A1c 8.2%  Follow up with Dr. Palla as needed.   Follow up with Dr. Brenner as needed.

## 2018-12-05 NOTE — PROGRESS NOTE ADULT - SUBJECTIVE AND OBJECTIVE BOX
ID Progress note     Name: SHON HAMEED  Age: 78y  Gender: Male  MRN: [id number]    Interval History-- Events noted, doing well, family requesting hospice evaluation meeting this am . Do not want PEG , still with NGT   Notes reviewed    Past Medical History--  Type 2 diabetes mellitus  Dyslipidemia  Hypothyroidism  Parkinson's disease  Diabetes mellitus  CAD (coronary artery disease) of artery bypass graft  Benign essential hypertension  Adult hypothyroidism  Acquired hypothyroidism  S/P CABG (coronary artery bypass graft)  Pacemaker      For details regarding the patient's social history, family history, and other miscellaneous elements, please refer the initial infectious diseases consultation and/or the admitting history and physical examination for this admission.    Allergies--  Allergies    No Known Allergies    Intolerances        Medications--  Antibiotics:  ertapenem  IVPB      ertapenem  IVPB 1000 milliGRAM(s) IV Intermittent every 24 hours    Immunologic:    Other:  acetaminophen    Suspension .. PRN  ALBUTerol/ipratropium for Nebulization  atorvastatin  carbidopa/levodopa  25/100  clopidogrel Tablet  dextrose 40% Gel PRN  dextrose 5%.  dextrose 50% Injectable  dextrose 50% Injectable  dextrose 50% Injectable  glucagon  Injectable PRN  insulin glargine Injectable (LANTUS)  insulin lispro (HumaLOG) corrective regimen sliding scale  levothyroxine  pantoprazole   Suspension  tamsulosin      Review of Systems--  Review of systems unable to be obtained secondary to clinical condition.    Physical Examination--    Vital Signs: T(F): 98.9 (12-05-18 @ 04:35), Max: 98.9 (12-05-18 @ 04:35)  HR: 78 (12-05-18 @ 07:29)  BP: 163/82 (12-05-18 @ 06:00)  RR: 31 (12-05-18 @ 06:00)  SpO2: 100% (12-05-18 @ 07:29)  Wt(kg): --    General: Nontoxic-appearing agitated Male in no acute distress.  HEENT: AT/NC. PERRL. EOMI. Anicteric. Conjunctiva pink and moist. Oropharynx clear. NGT+   Neck: Not rigid. No sense of mass.  Nodes: None palpable.  Lungs: Clear bilaterally without rales, wheezing or rhonchi  Heart: Regular rate and rhythm. No Murmur. No rub. No gallop. No palpable thrill.  Abdomen: Bowel sounds present and normoactive. Soft. Nondistended. Nontender. No sense of mass. No organomegaly.  Back: No spinal tenderness. No costovertebral angle tenderness.   Extremities: No cyanosis or clubbing. No edema.   Skin: Warm. Dry. Good turgor. No rash. No vasculitic stigmata.  Psychiatric: Appropriate affect and mood for situation.     Laboratory Studies--  CBC                        10.3   10.97 )-----------( 514      ( 05 Dec 2018 07:01 )             31.6       Chemistries  12-05    136  |  100  |  14  ----------------------------<  232<H>  4.6   |  29  |  0.82    Ca    8.3<L>      05 Dec 2018 07:01    TPro  5.6<L>  /  Alb  1.9<L>  /  TBili  0.6  /  DBili  x   /  AST  37  /  ALT  10  /  AlkPhos  118  12-04      Culture Data    Culture - Sputum (collected 29 Nov 2018 11:18)  Source: .Sputum Sputum/TRAP  Gram Stain (29 Nov 2018 13:43):    No polymorphonuclear leukocytes per low power field    No Squamous epithelial cells per low power field    No organisms seen per oil power field  Final Report (01 Dec 2018 09:26):    Moderate Klebsiella pneumoniae    Moderate Escherichia coli ESBL    Normal Respiratory Beatriz absent  Organism: Klebsiella pneumoniae  Escherichia coli ESBL (01 Dec 2018 09:26)  Organism: Escherichia coli ESBL (01 Dec 2018 09:26)  Organism: Klebsiella pneumoniae (01 Dec 2018 09:26)    Culture - Urine (collected 28 Nov 2018 10:34)  Source: .Urine Catheterized  Final Report (29 Nov 2018 11:24):    No growth      Assessment--    77 y/o M with PMH of DM, Dyslipidemia, CAD s/p CABG, PPM, Parkinson's Disease with Dementia, Hypothyroidism and BPH who presented with respiratory distress.  Has acute hypoxic respiratory failure secondary to  pneumonia likely aspiration as he has parkinson's diseases. Could have severe CAP too He was febrile and hypoxic , likely has severe sepsis with septic shock. He has been not on any pressors . Based on his presentation he will likely have positive blood cs   He likely has Gram negative pneumonia secondary to aspiration . Doubt its legionella., his urine legionella antigen is negative     he has poorly controlled DM    Sputum cs shows Klebsiella and ESBL E coli     he is s/p extubation , doing well,     Plan:  - will continue with  IV Invanz 1 gram daily as he has ESBL E coli  in sputum , dc antibiotics after  today's dose   - family considering home hospice   - continue with NGT feeding for now, consider dc NGT if accepted to home hospice   - serial CXR  - overall prognosis poor    Continue with present regime .  Appropriate use of antibiotics and adverse effects reviewed.    I have discussed the above plan of care with patient's family in detail. They expressed understanding of the treatment plan . Risks, benefits and alternatives discussed in detail. I have asked if they have any questions or concerns and appropriately addressed them to the best of my ability .      > 15 minutes spent in direct patient care reviewing  the notes, lab data/ imaging , discussion with multidisciplinary team. All questions were addressed and answered to the best of my capacity .    I will  sign off the case. recall if any new ID issues     Thank you for allowing me to participate in the care of your patient .        Stu Cage MD  [phone]

## 2018-12-05 NOTE — DISCHARGE NOTE ADULT - SECONDARY DIAGNOSIS.
AMEYA (acute kidney injury) Sepsis, due to unspecified organism Aspiration pneumonia of right lower lobe, unspecified aspiration pneumonia type Elevated troponin Hypothyroidism, unspecified type Parkinson disease

## 2018-12-05 NOTE — PROGRESS NOTE ADULT - SUBJECTIVE AND OBJECTIVE BOX
PULMONARY/CRITICAL CARE      INTERVAL HPI/OVERNIGHT EVENTS: Confused, intermittently agitated. Mild sob. No fever.    78y MaleHPI:  This is an 77 y/o M with PMH of DM, Dyslipidemia, CAD s/p CABG, PPM, Parkinson's Disease with Dementia, Hypothyroidism and BPH who presented with respiratory distress. As per patient's wife at the bedside he was at his baseline condition till 4 days ago when she noticed that he closes his mouth & doesn't allow any thing to be put in his mouth, and she has to use a dripper to feed him. Yesterday she noticed that he is not responding, and today he was breathing fast with audible wheezing, so she brought him to the hospital, no cough, fever, or chills at home, also denies vomiting, no BM over the past 4 days. Wife stated also that patient "lost his voice" a month ago because of his parkinson's disease, but was able to understand and interact non verbally, till yesterday. (25 Nov 2018 23:07)        PAST MEDICAL & SURGICAL HISTORY:  Type 2 diabetes mellitus  Dyslipidemia  Hypothyroidism  Parkinson's disease  CAD (coronary artery disease) of artery bypass graft  Benign essential hypertension  S/P CABG (coronary artery bypass graft)  Pacemaker        ICU Vital Signs Last 24 Hrs  T(C): 37.2 (05 Dec 2018 04:35), Max: 37.2 (05 Dec 2018 04:35)  T(F): 98.9 (05 Dec 2018 04:35), Max: 98.9 (05 Dec 2018 04:35)  HR: 75 (05 Dec 2018 06:00) (65 - 100)  BP: 163/82 (05 Dec 2018 06:00) (114/63 - 163/82)  BP(mean): 104 (05 Dec 2018 06:00) (79 - 107)  ABP: --  ABP(mean): --  RR: 31 (05 Dec 2018 06:00) (20 - 39)  SpO2: 100% (05 Dec 2018 06:00) (95% - 100%)    Qtts:     I&O's Summary    03 Dec 2018 07:01  -  04 Dec 2018 07:00  --------------------------------------------------------  IN: 2005 mL / OUT: 900 mL / NET: 1105 mL    04 Dec 2018 07:01  -  05 Dec 2018 06:53  --------------------------------------------------------  IN: 1700 mL / OUT: 725 mL / NET: 975 mL      REVIEW OF SYSTEMS:    CONSTITUTIONAL: No fever, weight loss, or fatigue  RESPIRATORY: No cough, wheezing, chills or hemoptysis; mild shortness of breath  CARDIOVASCULAR: No chest pain, palpitations, dizziness, or leg swelling  GASTROINTESTINAL: No abdominal or epigastric pain. No nausea, vomiting, or hematemesis; No diarrhea or constipation. No melena or hematochezia.  GENITOURINARY: No dysuria, frequency, hematuria, or incontinence  NEUROLOGICAL: No headaches, has memory loss, loss of strength, no  numbness, or tremors  SKIN: No itching, burning, rashes, or lesions s  MUSCULOSKELETAL: No joint pain or swelling; No muscle, back, or extremity pain, no calf tenderness  PSYCHIATRIC had some agitation          PHYSICAL EXAM:    GENERAL:, well-groomed, well-developed, elderly male mild sob  HEAD:  Atraumatic, Normocephalic  EYES: EOMI, PERRLA, conjunctiva and sclera clear  ENMT: No tonsillar erythema, exudates, or enlargement; Moist mucous membranes, Good dentition, No lesions  NG tube  NECK: Supple, No JVD, Normal thyroid  NERVOUS SYSTEM:  opens eyes, nonverbal, rigid extremities.  CHEST/LUNG: few rhonchi, no wheezing, or rubs Decreased excursion  HEART: Regular rate and rhythm; No murmurs, rubs, or gallops  ABDOMEN: Soft, Nontender, Nondistended; Bowel sounds present Suprapubic catheter intact.  EXTREMITIES:  2+ Peripheral Pulses, No clubbing, cyanosis, or edema  LYMPH: No lymphadenopathy noted  SKIN: No rashes or lesions            LABS:                        10.0   13.05 )-----------( 460      ( 04 Dec 2018 06:58 )             30.6     12-04    136  |  100  |  14  ----------------------------<  213<H>  4.3   |  30  |  0.83    Ca    7.9<L>      04 Dec 2018 06:58    TPro  5.6<L>  /  Alb  1.9<L>  /  TBili  0.6  /  DBili  x   /  AST  37  /  ALT  10  /  AlkPhos  118  12-04          vanco through     RADIOLOGY & ADDITIONAL STUDIES:  < from: Xray Chest 1 View- PORTABLE-Urgent (12.04.18 @ 14:27) >  EXAM:  XR CHEST PORTABLE URGENT 1V                                  PROCEDURE DATE:  12/04/2018          INTERPRETATION:  CLINICAL STATEMENT: NG tube.    TECHNIQUE: AP view of the lower chest.    FINDINGS:  NG tube noted with tip under the diaphragm overlying expected region of   stomach.    IMPRESSION:  NG tube as described above.                   INDY DOWNING M.D., ATTENDING RADIOLOGIST  This document has been electronically signed. Dec  4 2018  2:39PM              < end of copied text >      CRITICAL CARE TIME SPENT:

## 2018-12-05 NOTE — PROGRESS NOTE ADULT - PROBLEM SELECTOR PLAN 5
S/P suprapubic cath placement.   Patient is having some hematuria.   Will hold Heparin, ASA and Plavix.   Irrigate suprapubic cath as needed. S/P suprapubic cath placement.   Will discuss with Dr. Stark about taking tube out as per family request.

## 2018-12-05 NOTE — DISCHARGE NOTE ADULT - MEDICATION SUMMARY - MEDICATIONS TO TAKE
I will START or STAY ON the medications listed below when I get home from the hospital:    acetaminophen 160 mg/5 mL oral suspension  -- 650 milligram(s) by mouth every 6 hours, As Needed -Temp greater or equal to 38.5C (101.3F), Mild Pain (1 - 3)   -- Indication: For Pain or fever    morphine 20 mg/mL oral concentrate  -- 0.25 milliliter(s) by mouth every 2 hours, As Needed -for severe pain MDD:3 ml  -- Indication: For Pain    tamsulosin 0.4 mg oral capsule  -- 1 cap(s) by mouth once a day (at bedtime)  -- Indication: For BPH    insulin glargine 100 units/mL subcutaneous solution  -- 10 unit(s) subcutaneous once a day   -- Do not drink alcoholic beverages when taking this medication.  It is very important that you take or use this exactly as directed.  Do not skip doses or discontinue unless directed by your doctor.  Keep in refrigerator.  Do not freeze.    -- Indication: For Diabetes mellitus    Crestor 10 mg oral tablet  -- 1 tab(s) by mouth once a day (at bedtime)  -- Indication: For Hyperlipidemia    carbidopa-levodopa 25 mg-100 mg oral tablet  -- 1 tab(s) by mouth 5 times a day   -- Indication: For Parkinson disease    clopidogrel 75 mg oral tablet  -- 1 tab(s) by mouth once a day   -- Do not take aspirin or aspirin containing products without knowledge and consent of your physician.    -- Indication: For CAD (coronary artery disease)    ipratropium-albuterol 0.5 mg-2.5 mg/3 mLinhalation solution  -- 3 milliliter(s) inhaled every 6 hours  -- Indication: For SOB    pantoprazole 40 mg oral granule, delayed release  -- 40 milligram(s) by mouth once a day   -- Indication: For GERD    levothyroxine 125 mcg (0.125 mg) oral tablet  -- 1 tab(s) by mouth once a day  -- Indication: For Hypothyroidism

## 2018-12-05 NOTE — PROGRESS NOTE ADULT - ASSESSMENT
79 y/o M with PMH of DM, Dyslipidemia, CAD s/p CABG, PPM, Parkinson's Disease with Dementia, Hypothyroidism and BPH presented with respiratory distress.   Acute respiratory failure due to pneumonia, ?early ARDS.    Sepsis due to pneumonia with high lactate--improved.  Parkinsonism with dementia  DM with hi glu   Extubated, doing well. Pneumonia resolving. Mild sob.  Family now considering Hospice. If not comfort, will need PEG.  Poor mental status.

## 2018-12-05 NOTE — CHART NOTE - NSCHARTNOTEFT_GEN_A_CORE
Assessment:  78 year old male adm from home c PNA (likely asp) and sepsis.  Pt extubated, continues on NGT feedings of Glucerna 1.2 @goal 60ml/hr x24hrs as pt failed initial SLP eval and reeval yesterday 12/4.  Family noted to not want PEG placement.  Hospice consult ordered and meeting c family this am; once accepted to hospice would recommend d/c NGT feedings and consider removal of BL wrist restraints.  S/p mucoid stool x1.  RD to remain available.     Factors impacting intake: [ ] none [ ] nausea  [ ] vomiting [ ] diarrhea [ ] constipation  [ ]chewing problems [ ] swallowing issues  [x ] other: parkinsons disease, failed slp evals    Diet Presciption: Diet, NPO with Tube Feed:   Tube Feeding Modality: Nasogastric  Glucerna 1.2 Kush  Total Volume for 24 Hours (mL): 1440  Continuous  Starting Tube Feed Rate {mL per Hour}: 40  Increase Tube Feed Rate by (mL): 10     Every 6 hours  Until Goal Tube Feed Rate (mL per Hour): 60  Tube Feed Duration (in Hours): 24  Tube Feed Start Time: 12:00 (11-29-18 @ 11:22)    Intake: current on NGT feedings of Glucerna 1.2 @ goal 60ml    Current Weight:   % Weight Change adm wt 132#, 2.4# wt loss noted since adm    Pertinent Medications: MEDICATIONS  (STANDING):  ALBUTerol/ipratropium for Nebulization 3 milliLiter(s) Nebulizer every 6 hours  atorvastatin 40 milliGRAM(s) Oral at bedtime  carbidopa/levodopa  25/100 1 Tablet(s) Oral <User Schedule>  clopidogrel Tablet 75 milliGRAM(s) Oral daily  dextrose 5%. 1000 milliLiter(s) (50 mL/Hr) IV Continuous <Continuous>  dextrose 50% Injectable 12.5 Gram(s) IV Push once  dextrose 50% Injectable 25 Gram(s) IV Push once  dextrose 50% Injectable 25 Gram(s) IV Push once  insulin glargine Injectable (LANTUS) 10 Unit(s) SubCutaneous at bedtime  insulin lispro (HumaLOG) corrective regimen sliding scale   SubCutaneous every 6 hours  levothyroxine 125 MICROGram(s) Enteral Tube daily  pantoprazole   Suspension 40 milliGRAM(s) Enteral Tube daily  tamsulosin 0.4 milliGRAM(s) Oral at bedtime    MEDICATIONS  (PRN):  acetaminophen    Suspension .. 650 milliGRAM(s) Enteral Tube every 6 hours PRN Temp greater or equal to 38.5C (101.3F)  dextrose 40% Gel 15 Gram(s) Oral once PRN Blood Glucose LESS THAN 70 milliGRAM(s)/deciliter  glucagon  Injectable 1 milliGRAM(s) IntraMuscular once PRN Glucose LESS THAN 70 milligrams/deciliter    Pertinent Labs: 12-05 Na136 mmol/L Glu 232 mg/dL<H> K+ 4.6 mmol/L Cr  0.82 mg/dL BUN 14 mg/dL 12-03 Phos 4.0 mg/dL 12-04 Alb 1.9 g/dL<L> 11-27 [id number] 8.2 %<H>     CAPILLARY BLOOD GLUCOSE      POCT Blood Glucose.: 188 mg/dL (05 Dec 2018 06:41)  POCT Blood Glucose.: 196 mg/dL (04 Dec 2018 23:17)  POCT Blood Glucose.: 202 mg/dL (04 Dec 2018 17:44)  POCT Blood Glucose.: 219 mg/dL (04 Dec 2018 13:08)    Skin: sacrum stage II, coccyx stage II, L hip DTIL foot/top of foot- unstaged    Estimated Needs:   [x ] no change since previous assessment  [ ] recalculated:     Previous Nutrition Diagnosis:   [ ] Inadequate Energy Intake [ x]Inadequate Oral Intake [ ] Excessive Energy Intake   [ ] Underweight [ ] Increased Nutrient Needs [ ] Overweight/Obesity   [ ] Altered GI Function [ ] Unintended Weight Loss [ ] Food & Nutrition Related Knowledge Deficit [ ] Malnutrition     Nutrition Diagnosis is [ x] ongoing  [ ] resolved [ ] not applicable     New Nutrition Diagnosis: [ ] not applicable       Interventions: NGT feedings, hospice eval  Recommend  [ ] Change Diet To:  [ ] Nutrition Supplement  [ ] Nutrition Support  [ ] Other:     Monitoring and Evaluation:   [ ] PO intake [ x ] Tolerance to TF [ x ] weights [ x ] labs[ x ] follow up per protocol  [ ] other:

## 2018-12-05 NOTE — PROGRESS NOTE ADULT - ASSESSMENT
This is an 77 y/o M with PMH of DM, Dyslipidemia, CAD s/p CABG, PPM, Parkinson's Disease with Dementia, Hypothyroidism and BPH who presented with respiratory distress. As per patient's wife at the bedside he was at his baseline condition till 4 days ago when she noticed that he closes his mouth & doesn't allow any thing to be put in his mouth, and she has to use a dripper to feed him. Yesterday she noticed that he is not responding, and today he was breathing fast with audible wheezing, so she brought him to the hospital, no cough, fever, or chills at home, also denies vomiting, no BM over the past 4 days. Wife stated also that patient "lost his voice" a month ago because of his parkinson's disease, but was able to understand and interact non verbally, till yesterday. (25 Nov 2018 23:07)

## 2018-12-05 NOTE — DISCHARGE NOTE ADULT - NS AS ACTIVITY OBS
Do not make important decisions/Do not drive or operate machinery/Bathing allowed/No Heavy lifting/straining

## 2018-12-05 NOTE — DISCHARGE NOTE ADULT - HOSPITAL COURSE
Patient admitted with severe sepsis, acute respiratory failure with hypoxemia, aspiration pneumonia, AMEYA from ATN from sepsis, urinary retention. He improved with IVF, IV antibiotics, Vent support and was extubated. He failed his swallow eval multiple times. Patient's family opted not to place PEG tube and wanted patient to be discharged home on home hospice. Patient is being discharged home on home hospice.     His prognosis is very poor. Patient is terminal.

## 2018-12-05 NOTE — DISCHARGE NOTE ADULT - CARE PROVIDER_API CALL
Palla, Venugopal R (MD), Cardiovascular Disease; Internal Medicine  [address], [place] [id number]  Phone: [phone]  Fax: [phone]    Wilbert Brenner; MBBS), Internal Medicine  [address] [place] [address]  Phone: [phone]  Fax: [phone]

## 2018-12-05 NOTE — DISCHARGE NOTE ADULT - CARE PLAN
Principal Discharge DX:	Acute respiratory failure with hypoxia  Goal:	Comfort care. Endo of life care  Assessment and plan of treatment:	NPO  Patient is being discharged home with home hospice for end of life care.   Follow up with Dr. Palla as needed.   Follow up with Dr. Brenner as needed.  Secondary Diagnosis:	AMEYA (acute kidney injury)  Secondary Diagnosis:	Sepsis, due to unspecified organism  Secondary Diagnosis:	Aspiration pneumonia of right lower lobe, unspecified aspiration pneumonia type  Secondary Diagnosis:	Elevated troponin  Secondary Diagnosis:	Hypothyroidism, unspecified type  Secondary Diagnosis:	Parkinson disease

## 2018-12-06 LAB
ANION GAP SERPL CALC-SCNC: 6 MMOL/L — SIGNIFICANT CHANGE UP (ref 5–17)
BUN SERPL-MCNC: 16 MG/DL — SIGNIFICANT CHANGE UP (ref 7–23)
CALCIUM SERPL-MCNC: 8.6 MG/DL — SIGNIFICANT CHANGE UP (ref 8.4–10.5)
CHLORIDE SERPL-SCNC: 99 MMOL/L — SIGNIFICANT CHANGE UP (ref 96–108)
CO2 SERPL-SCNC: 32 MMOL/L — HIGH (ref 22–31)
CREAT SERPL-MCNC: 0.89 MG/DL — SIGNIFICANT CHANGE UP (ref 0.5–1.3)
GLUCOSE SERPL-MCNC: 206 MG/DL — HIGH (ref 70–99)
HCT VFR BLD CALC: 33.5 % — LOW (ref 39–50)
HGB BLD-MCNC: 10.6 G/DL — LOW (ref 13–17)
MCHC RBC-ENTMCNC: 27.6 PG — SIGNIFICANT CHANGE UP (ref 27–34)
MCHC RBC-ENTMCNC: 31.6 GM/DL — LOW (ref 32–36)
MCV RBC AUTO: 87.2 FL — SIGNIFICANT CHANGE UP (ref 80–100)
NRBC # BLD: 0 /100 WBCS — SIGNIFICANT CHANGE UP (ref 0–0)
PLATELET # BLD AUTO: 589 K/UL — HIGH (ref 150–400)
POTASSIUM SERPL-MCNC: 4.4 MMOL/L — SIGNIFICANT CHANGE UP (ref 3.5–5.3)
POTASSIUM SERPL-SCNC: 4.4 MMOL/L — SIGNIFICANT CHANGE UP (ref 3.5–5.3)
RBC # BLD: 3.84 M/UL — LOW (ref 4.2–5.8)
RBC # FLD: 15 % — HIGH (ref 10.3–14.5)
SODIUM SERPL-SCNC: 137 MMOL/L — SIGNIFICANT CHANGE UP (ref 135–145)
WBC # BLD: 12.91 K/UL — HIGH (ref 3.8–10.5)
WBC # FLD AUTO: 12.91 K/UL — HIGH (ref 3.8–10.5)

## 2018-12-06 PROCEDURE — 71045 X-RAY EXAM CHEST 1 VIEW: CPT | Mod: 26

## 2018-12-06 RX ORDER — MORPHINE SULFATE 50 MG/1
2 CAPSULE, EXTENDED RELEASE ORAL EVERY 4 HOURS
Qty: 0 | Refills: 0 | Status: DISCONTINUED | OUTPATIENT
Start: 2018-12-06 | End: 2018-12-07

## 2018-12-06 RX ORDER — MORPHINE SULFATE 50 MG/1
2 CAPSULE, EXTENDED RELEASE ORAL EVERY 4 HOURS
Qty: 0 | Refills: 0 | Status: DISCONTINUED | OUTPATIENT
Start: 2018-12-06 | End: 2018-12-06

## 2018-12-06 RX ADMIN — Medication 3 MILLILITER(S): at 20:43

## 2018-12-06 RX ADMIN — Medication 3 MILLILITER(S): at 13:28

## 2018-12-06 RX ADMIN — Medication 2: at 06:11

## 2018-12-06 RX ADMIN — CARBIDOPA AND LEVODOPA 1 TABLET(S): 25; 100 TABLET ORAL at 22:06

## 2018-12-06 RX ADMIN — CARBIDOPA AND LEVODOPA 1 TABLET(S): 25; 100 TABLET ORAL at 14:41

## 2018-12-06 RX ADMIN — Medication 2: at 18:20

## 2018-12-06 RX ADMIN — Medication 2: at 23:51

## 2018-12-06 RX ADMIN — Medication 2: at 12:21

## 2018-12-06 RX ADMIN — HEPARIN SODIUM 5000 UNIT(S): 5000 INJECTION INTRAVENOUS; SUBCUTANEOUS at 18:20

## 2018-12-06 RX ADMIN — MORPHINE SULFATE 2 MILLIGRAM(S): 50 CAPSULE, EXTENDED RELEASE ORAL at 22:05

## 2018-12-06 RX ADMIN — HEPARIN SODIUM 5000 UNIT(S): 5000 INJECTION INTRAVENOUS; SUBCUTANEOUS at 06:10

## 2018-12-06 RX ADMIN — CARBIDOPA AND LEVODOPA 1 TABLET(S): 25; 100 TABLET ORAL at 10:23

## 2018-12-06 RX ADMIN — CARBIDOPA AND LEVODOPA 1 TABLET(S): 25; 100 TABLET ORAL at 18:20

## 2018-12-06 RX ADMIN — INSULIN GLARGINE 10 UNIT(S): 100 INJECTION, SOLUTION SUBCUTANEOUS at 22:06

## 2018-12-06 RX ADMIN — Medication 3 MILLILITER(S): at 01:43

## 2018-12-06 RX ADMIN — CARBIDOPA AND LEVODOPA 1 TABLET(S): 25; 100 TABLET ORAL at 06:10

## 2018-12-06 RX ADMIN — TAMSULOSIN HYDROCHLORIDE 0.4 MILLIGRAM(S): 0.4 CAPSULE ORAL at 22:06

## 2018-12-06 RX ADMIN — PANTOPRAZOLE SODIUM 40 MILLIGRAM(S): 20 TABLET, DELAYED RELEASE ORAL at 12:22

## 2018-12-06 RX ADMIN — Medication 3 MILLILITER(S): at 08:04

## 2018-12-06 RX ADMIN — CLOPIDOGREL BISULFATE 75 MILLIGRAM(S): 75 TABLET, FILM COATED ORAL at 12:22

## 2018-12-06 RX ADMIN — MORPHINE SULFATE 2 MILLIGRAM(S): 50 CAPSULE, EXTENDED RELEASE ORAL at 22:07

## 2018-12-06 RX ADMIN — ATORVASTATIN CALCIUM 40 MILLIGRAM(S): 80 TABLET, FILM COATED ORAL at 22:06

## 2018-12-06 RX ADMIN — Medication 125 MICROGRAM(S): at 06:10

## 2018-12-06 NOTE — PROGRESS NOTE ADULT - PROBLEM SELECTOR PLAN 5
S/P suprapubic cath placement.   Discussed with Dr. Stark on phone. Suprapubic cath removed without difficulty. Dressing applied. Monitor for any bleeding and voiding.

## 2018-12-06 NOTE — PROGRESS NOTE ADULT - SUBJECTIVE AND OBJECTIVE BOX
INTERVAL HPI/OVERNIGHT EVENTS:  No new overnight event.  No N/V/D.  Tolerating diet via ngt    MEDICATIONS  (STANDING):  ALBUTerol/ipratropium for Nebulization 3 milliLiter(s) Nebulizer every 6 hours  atorvastatin 40 milliGRAM(s) Oral at bedtime  carbidopa/levodopa  25/100 1 Tablet(s) Oral <User Schedule>  clopidogrel Tablet 75 milliGRAM(s) Oral daily  dextrose 5%. 1000 milliLiter(s) (50 mL/Hr) IV Continuous <Continuous>  dextrose 50% Injectable 12.5 Gram(s) IV Push once  dextrose 50% Injectable 25 Gram(s) IV Push once  dextrose 50% Injectable 25 Gram(s) IV Push once  heparin  Injectable 5000 Unit(s) SubCutaneous every 12 hours  insulin glargine Injectable (LANTUS) 10 Unit(s) SubCutaneous at bedtime  insulin lispro (HumaLOG) corrective regimen sliding scale   SubCutaneous every 6 hours  levothyroxine 125 MICROGram(s) Enteral Tube daily  pantoprazole   Suspension 40 milliGRAM(s) Enteral Tube daily  tamsulosin 0.4 milliGRAM(s) Oral at bedtime    MEDICATIONS  (PRN):  acetaminophen    Suspension .. 650 milliGRAM(s) Oral every 6 hours PRN Temp greater or equal to 38.5C (101.3F), Mild Pain (1 - 3)  dextrose 40% Gel 15 Gram(s) Oral once PRN Blood Glucose LESS THAN 70 milliGRAM(s)/deciliter  glucagon  Injectable 1 milliGRAM(s) IntraMuscular once PRN Glucose LESS THAN 70 milligrams/deciliter      Allergies    No Known Allergies    Intolerances          General:  No wt loss, fevers, chills, night sweats, fatigue,   Eyes:  Good vision, no reported pain  ENT:  No sore throat, pain, runny nose, dysphagia  CV:  No pain, palpitations, hypo/hypertension  Resp:  No dyspnea, cough, tachypnea, wheezing  GI:  No pain, No nausea, No vomiting, No diarrhea, No constipation, No weight loss, No fever, No pruritis, No rectal bleeding, No tarry stools, No dysphagia,  :  No pain, bleeding, incontinence, nocturia  Muscle:  No pain, weakness  Neuro:  No weakness, tingling, memory problems  Psych:  No fatigue, insomnia, mood problems, depression  Endocrine:  No polyuria, polydipsia, cold/heat intolerance  Heme:  No petechiae, ecchymosis, easy bruisability  Skin:  No rash, tattoos, scars, edema      PHYSICAL EXAM:   Vital Signs:  Vital Signs Last 24 Hrs  T(C): 36.9 (06 Dec 2018 16:01), Max: 37.2 (06 Dec 2018 00:14)  T(F): 98.5 (06 Dec 2018 16:01), Max: 98.9 (06 Dec 2018 00:14)  HR: 78 (06 Dec 2018 14:00) (63 - 95)  BP: 140/70 (06 Dec 2018 14:00) (112/61 - 176/90)  BP(mean): 91 (06 Dec 2018 14:00) (74 - 116)  RR: 25 (06 Dec 2018 14:00) (25 - 37)  SpO2: 100% (06 Dec 2018 14:00) (90% - 100%)  Daily     Daily Weight in k.3 (06 Dec 2018 04:15)I&O's Summary    05 Dec 2018 07:01  -  06 Dec 2018 07:00  --------------------------------------------------------  IN: 1615 mL / OUT: 600 mL / NET: 1015 mL    06 Dec 2018 07:01  -  06 Dec 2018 17:32  --------------------------------------------------------  IN: 765 mL / OUT: 0 mL / NET: 765 mL        GENERAL:  Appears stated age, well-groomed, well-nourished, no distress  HEENT:  NC/AT,  conjunctivae clear and pink, no thyromegaly, nodules, adenopathy, no JVD, sclera -anicteric  CHEST:  Full & symmetric excursion, no increased effort, breath sounds clear  HEART:  Regular rhythm, S1, S2, no murmur/rub/S3/S4, no abdominal bruit, no edema  ABDOMEN:  Soft, non-tender, non-distended, normoactive bowel sounds,  no masses ,no hepato-splenomegaly, no signs of chronic liver disease ngt  EXTEREMITIES:  no cyanosis,clubbing or edema  SKIN:  No rash/erythema/ecchymoses/petechiae/wounds/abscess/warm/dry  NEURO:  Alert, oriented, no asterixis, no tremor, no encephalopathy      LABS:                        10.6   12.91 )-----------( 589      ( 06 Dec 2018 06:47 )             33.5     12    137  |  99  |  16  ----------------------------<  206<H>  4.4   |  32<H>  |  0.89    Ca    8.6      06 Dec 2018 06:47          amylase   lipase  RADIOLOGY & ADDITIONAL TESTS:

## 2018-12-06 NOTE — PROGRESS NOTE ADULT - ASSESSMENT
79 y/o M with PMH of DM, Dyslipidemia, CAD s/p CABG, PPM, Parkinson's Disease with Dementia, Hypothyroidism and BPH presented with respiratory distress.   Acute respiratory failure due to pneumonia, ?early ARDS.    Sepsis due to pneumonia with high lactate--improved.  Parkinsonism with dementia  DM with hi glu   Extubated, doing well. Pneumonia resolving. Mild sob persists.  Family now considering Hospice.  May go home Friday on home hospice.  Poor mental status.

## 2018-12-06 NOTE — PROGRESS NOTE ADULT - SUBJECTIVE AND OBJECTIVE BOX
Patient is a 78y old  Male who presents with a chief complaint of Respiratory distress. (04 Dec 2018 11:31)    HPI:  This is an 77 y/o M with PMH of DM, Dyslipidemia, CAD s/p CABG, PPM, Parkinson's Disease with Dementia, Hypothyroidism and BPH who presented with respiratory distress. As per patient's wife at the bedside he was at his baseline condition till 4 days ago when she noticed that he closes his mouth & doesn't allow any thing to be put in his mouth, and she has to use a dripper to feed him. Yesterday she noticed that he is not responding, and today he was breathing fast with audible wheezing, so she brought him to the hospital, no cough, fever, or chills at home, also denies vomiting, no BM over the past 4 days. Wife stated also that patient "lost his voice" a month ago because of his parkinson's disease, but was able to understand and interact non verbally, till yesterday. (25 Nov 2018 23:07)    INTERVAL HPI/OVERNIGHT EVENTS:  Chart reviewed, notes reviewed.   Patient seen and examined.  Being followed by following specialists: ID, Pulm/CC, Cardiology, GI.     Consultant(s) Notes Reviewed:  [X] Yes    Care Discussed with Consultants/Other Providers: [X] Yes    12/04/18 --> Patient admitted to hospital with sepsis, septic shock, respiratory failure, pneumonia, AMEYA and urinary retention. Patient slowly improved. He also was found to have UTI due to ESBL. Patient was extubated and was downgraded.  He is transferred to my service. Patient remains minimally responsive and non verbal. He failed his speech and swallow eval. He is confused and pulls out the tubes and lines. He pulled out his NG tube last night and it had to be replaced. Patient again pulled out NG tube partially. Son is at bedside.   12/05/18 --> Remains nonverbal. He gets restless at times and pulls on tubes. Not being able to redirect. Seen by Hospice today and accepted to home hospice program.     12/06/18 --. More awake and alert today. Tries to pull out on tubes and lines. He has been voiding and been incontinent of urine despite having suprapubic tube.     REVIEW OF SYSTEMS: Unable to obtain ROS as patient is non verbal and not able to provide any history. It is as per HPI, rest is negative.    Allergies: No Known Allergies    Intolerances    MEDICATIONS  (STANDING):  ALBUTerol/ipratropium for Nebulization 3 milliLiter(s) Nebulizer every 6 hours  atorvastatin 40 milliGRAM(s) Oral at bedtime  carbidopa/levodopa  25/100 1 Tablet(s) Oral <User Schedule>  clopidogrel Tablet 75 milliGRAM(s) Oral daily  dextrose 5%. 1000 milliLiter(s) (50 mL/Hr) IV Continuous <Continuous>  dextrose 50% Injectable 12.5 Gram(s) IV Push once  dextrose 50% Injectable 25 Gram(s) IV Push once  dextrose 50% Injectable 25 Gram(s) IV Push once  heparin  Injectable 5000 Unit(s) SubCutaneous every 12 hours  insulin glargine Injectable (LANTUS) 10 Unit(s) SubCutaneous at bedtime  insulin lispro (HumaLOG) corrective regimen sliding scale   SubCutaneous every 6 hours  levothyroxine 125 MICROGram(s) Enteral Tube daily  pantoprazole   Suspension 40 milliGRAM(s) Enteral Tube daily  tamsulosin 0.4 milliGRAM(s) Oral at bedtime    MEDICATIONS  (PRN):  acetaminophen    Suspension .. 650 milliGRAM(s) Oral every 6 hours PRN Temp greater or equal to 38.5C (101.3F), Mild Pain (1 - 3)  dextrose 40% Gel 15 Gram(s) Oral once PRN Blood Glucose LESS THAN 70 milliGRAM(s)/deciliter  glucagon  Injectable 1 milliGRAM(s) IntraMuscular once PRN Glucose LESS THAN 70 milligrams/deciliter    Vital Signs Last 24 Hrs  T(C): 36.4 (06 Dec 2018 08:20), Max: 37.2 (06 Dec 2018 00:14)  T(F): 97.5 (06 Dec 2018 08:20), Max: 98.9 (06 Dec 2018 00:14)  HR: 80 (06 Dec 2018 13:29) (63 - 95)  BP: 143/69 (06 Dec 2018 10:00) (112/61 - 176/90)  BP(mean): 88 (06 Dec 2018 10:00) (74 - 116)  RR: 31 (06 Dec 2018 10:00) (29 - 37)  SpO2: 98% (06 Dec 2018 13:29) (90% - 100%)    PHYSICAL EXAM:  GENERAL: Thin built male in no acute distress.   HEAD:  Atraumatic, Normocephalic  EYES: Pallor +  ENMT: NG tube noted.   NECK: Supple,   CHEST/LUNG: Decreased breath sounds at bases. occasional rhonchi +  HEART: S1, S2, SM +  ABDOMEN: Soft,  Bowel sounds present. Suprapubic Cath noted.   EXTREMITIES:  2+ Peripheral Pulses, No clubbing, cyanosis, or edema  MS: No joint swelling or deformity.   LYMPH: No lymphadenopathy noted  SKIN: No rashes or lesions  NERVOUS SYSTEM: minimally responsive.   PSYCH: Non verbal.     LABS:                       10.6   12.91 )-----------( 589      ( 06 Dec 2018 06:47 )             33.5     06 Dec 2018 06:47    137    |  99     |  16     ----------------------------<  206    4.4     |  32     |  0.89     Ca    8.6        06 Dec 2018 06:47    CAPILLARY BLOOD GLUCOSE  POCT Blood Glucose.: 199 mg/dL (06 Dec 2018 11:42)  POCT Blood Glucose.: 172 mg/dL (06 Dec 2018 05:28)  POCT Blood Glucose.: 191 mg/dL (05 Dec 2018 23:03)  POCT Blood Glucose.: 180 mg/dL (05 Dec 2018 17:01)      11-27 NenujpzbzrA3D 8.2  11-26 VjqxkefojuQ0T 8.5    RADIOLOGY TEST: (IMAGES REVIEWED BY ME)  < from: Xray Chest 1 View- PORTABLE-Urgent (12.04.18 @ 14:27) >  EXAM:  XR CHEST PORTABLE URGENT 1V                        PROCEDURE DATE:  12/04/2018    INTERPRETATION:  CLINICAL STATEMENT: NG tube.    TECHNIQUE: AP view of the lower chest.    FINDINGS:  NG tube noted with tip under the diaphragm overlying expected region of   stomach.    IMPRESSION:  NG tube as described above.     < end of copied text >    < from: Xray Chest 1 View- PORTABLE-Routine (12.04.18 @ 12:45) >  EXAM:  XR CHEST PORTABLE ROUTINE 1V                        PROCEDURE DATE:  12/04/2018    INTERPRETATION:  CLINICAL STATEMENT: Patient removed NG tube    TECHNIQUE: AP view of the chest.    COMPARISON: 12/3/2018    FINDINGS/  IMPRESSION:  Tip of NG tube overlies proximal esophagus. Repositioning recommended.    Sternotomy wires, left cardiac device again noted. Increased interstitial   lung markings without significant change. Mild opacities right lung base   slightly improving.    No significant pleural effusion.    Curvilinear density overlies lateral right upper lung zone which may be   related to overlying soft tissue/skin fold.    Heart size cannot be accurately assessed in this projection.    Special service nurse notified 12/4/2018 at 12:56 PM    < end of copied text >    Imaging Personally Reviewed:  [X] YES        HEALTH ISSUES - PROBLEM Dx:  Dysphagia: Dysphagia  Elevated troponin: Elevated troponin  Need for prophylactic measure: Need for prophylactic measure  CAD (coronary artery disease): CAD (coronary artery disease)  Coagulopathy: Coagulopathy  AMEYA (acute kidney injury): AMEYA (acute kidney injury)  Acute respiratory failure: Acute respiratory failure  Sepsis: Sepsis  PNA (pneumonia): PNA (pneumonia)  Urinary retention: Urinary retention  Parkinson's disease: Parkinson&#x27;s disease  Hypothyroidism, unspecified type: Hypothyroidism, unspecified type  Diabetes mellitus: Diabetes mellitus  Pneumonia: Pneumonia  Sepsis, due to unspecified organism: Sepsis, due to unspecified organism  Acute respiratory failure with hypoxia: Acute respiratory failure with hypoxia

## 2018-12-06 NOTE — PROGRESS NOTE ADULT - SUBJECTIVE AND OBJECTIVE BOX
PULMONARY/CRITICAL CARE      INTERVAL HPI/OVERNIGHT EVENTS:  Still some sob. Some congestion. Nonverbal. No fever.  78y MaleHPI:  This is an 77 y/o M with PMH of DM, Dyslipidemia, CAD s/p CABG, PPM, Parkinson's Disease with Dementia, Hypothyroidism and BPH who presented with respiratory distress. As per patient's wife at the bedside he was at his baseline condition till 4 days ago when she noticed that he closes his mouth & doesn't allow any thing to be put in his mouth, and she has to use a dripper to feed him. Yesterday she noticed that he is not responding, and today he was breathing fast with audible wheezing, so she brought him to the hospital, no cough, fever, or chills at home, also denies vomiting, no BM over the past 4 days. Wife stated also that patient "lost his voice" a month ago because of his parkinson's disease, but was able to understand and interact non verbally, till yesterday. (25 Nov 2018 23:07)        PAST MEDICAL & SURGICAL HISTORY:  Type 2 diabetes mellitus  Dyslipidemia  Hypothyroidism  Parkinson's disease  CAD (coronary artery disease) of artery bypass graft  Benign essential hypertension  S/P CABG (coronary artery bypass graft)  Pacemaker        ICU Vital Signs Last 24 Hrs  T(C): 36.8 (06 Dec 2018 04:15), Max: 37.2 (06 Dec 2018 00:14)  T(F): 98.2 (06 Dec 2018 04:15), Max: 98.9 (06 Dec 2018 00:14)  HR: 95 (06 Dec 2018 06:00) (63 - 95)  BP: 176/90 (06 Dec 2018 06:00) (112/61 - 176/90)  BP(mean): 116 (06 Dec 2018 06:00) (74 - 116)  ABP: --  ABP(mean): --  RR: 33 (06 Dec 2018 06:00) (29 - 42)  SpO2: 98% (06 Dec 2018 06:00) (90% - 100%)    Qtts:     I&O's Summary    05 Dec 2018 07:01  -  06 Dec 2018 07:00  --------------------------------------------------------  IN: 1530 mL / OUT: 600 mL / NET: 930 mL      ROS:    CONSTITUTIONAL: No fever, weight loss, or fatigue  RESPIRATORY: No cough, wheezing, chills or hemoptysis; mild shortness of breath  CARDIOVASCULAR: No chest pain, palpitations, dizziness, or leg swelling  GASTROINTESTINAL: No abdominal or epigastric pain. No nausea, vomiting, or hematemesis; No diarrhea or constipation. No melena or hematochezia.  GENITOURINARY: No dysuria, frequency, hematuria, or incontinence  NEUROLOGICAL: No headaches, has memory loss, loss of strength, no  numbness, or tremors  SKIN: No itching, burning, rashes, or lesions s  MUSCULOSKELETAL: No joint pain or swelling; No muscle, back, or extremity pain, no calf tenderness  PSYCHIATRIC had some agitation          PHYSICAL EXAM:    GENERAL:, well-groomed, well-developed, elderly male mild sob  HEAD:  Atraumatic, Normocephalic  EYES: EOMI, PERRLA, conjunctiva and sclera clear  ENMT: No tonsillar erythema, exudates, or enlargement; Moist mucous membranes, Good dentition, No lesions  NG tube  NECK: Supple, No JVD, Normal thyroid  NERVOUS SYSTEM:  opens eyes, nonverbal, rigid extremities.  CHEST/LUNG: few rhonchi, no wheezing, or rubs Decreased excursion  HEART: Regular rate and rhythm; No murmurs, rubs, or gallops  ABDOMEN: Soft, Nontender, Nondistended; Bowel sounds present Suprapubic catheter intact.  EXTREMITIES:  2+ Peripheral Pulses, No clubbing, cyanosis, or edema  LYMPH: No lymphadenopathy noted  SKIN: No rashes or lesions          LABS:                        10.6   12.91 )-----------( 589      ( 06 Dec 2018 06:47 )             33.5     12-05    136  |  100  |  14  ----------------------------<  232<H>  4.6   |  29  |  0.82    Ca    8.3<L>      05 Dec 2018 07:01            vanco through     RADIOLOGY & ADDITIONAL STUDIES:  < from: Xray Chest 1 View- PORTABLE-Urgent (12.04.18 @ 14:27) >  EXAM:  XR CHEST PORTABLE URGENT 1V                                  PROCEDURE DATE:  12/04/2018          INTERPRETATION:  CLINICAL STATEMENT: NG tube.    TECHNIQUE: AP view of the lower chest.    FINDINGS:  NG tube noted with tip under the diaphragm overlying expected region of   stomach.    IMPRESSION:  NG tube as described above.                   INDY DOWNING M.D., ATTENDING RADIOLOGIST  This document has been electronically signed. Dec  4 2018  2:39PM              CXR today rll infil    CRITICAL CARE TIME SPENT:

## 2018-12-06 NOTE — PROGRESS NOTE ADULT - ATTENDING COMMENTS
Patient needs bilateral wrist restrains to prevent him pulling out his NG tube.     Prognosis very poor.     For home Hospice in AM.

## 2018-12-07 VITALS — TEMPERATURE: 98 F

## 2018-12-07 PROCEDURE — 87581 M.PNEUMON DNA AMP PROBE: CPT

## 2018-12-07 PROCEDURE — 80053 COMPREHEN METABOLIC PANEL: CPT

## 2018-12-07 PROCEDURE — 82248 BILIRUBIN DIRECT: CPT

## 2018-12-07 PROCEDURE — 94799 UNLISTED PULMONARY SVC/PX: CPT

## 2018-12-07 PROCEDURE — 87186 SC STD MICRODIL/AGAR DIL: CPT

## 2018-12-07 PROCEDURE — 93005 ELECTROCARDIOGRAM TRACING: CPT

## 2018-12-07 PROCEDURE — 94640 AIRWAY INHALATION TREATMENT: CPT

## 2018-12-07 PROCEDURE — 80048 BASIC METABOLIC PNL TOTAL CA: CPT

## 2018-12-07 PROCEDURE — 85730 THROMBOPLASTIN TIME PARTIAL: CPT

## 2018-12-07 PROCEDURE — P9047: CPT

## 2018-12-07 PROCEDURE — 82962 GLUCOSE BLOOD TEST: CPT

## 2018-12-07 PROCEDURE — 87449 NOS EACH ORGANISM AG IA: CPT

## 2018-12-07 PROCEDURE — 94660 CPAP INITIATION&MGMT: CPT

## 2018-12-07 PROCEDURE — 87486 CHLMYD PNEUM DNA AMP PROBE: CPT

## 2018-12-07 PROCEDURE — 83880 ASSAY OF NATRIURETIC PEPTIDE: CPT

## 2018-12-07 PROCEDURE — 87633 RESP VIRUS 12-25 TARGETS: CPT

## 2018-12-07 PROCEDURE — 85610 PROTHROMBIN TIME: CPT

## 2018-12-07 PROCEDURE — 96368 THER/DIAG CONCURRENT INF: CPT

## 2018-12-07 PROCEDURE — 82009 KETONE BODYS QUAL: CPT

## 2018-12-07 PROCEDURE — 80074 ACUTE HEPATITIS PANEL: CPT

## 2018-12-07 PROCEDURE — 99291 CRITICAL CARE FIRST HOUR: CPT | Mod: 25

## 2018-12-07 PROCEDURE — 36600 WITHDRAWAL OF ARTERIAL BLOOD: CPT

## 2018-12-07 PROCEDURE — 76705 ECHO EXAM OF ABDOMEN: CPT

## 2018-12-07 PROCEDURE — 87798 DETECT AGENT NOS DNA AMP: CPT

## 2018-12-07 PROCEDURE — 81001 URINALYSIS AUTO W/SCOPE: CPT

## 2018-12-07 PROCEDURE — 83036 HEMOGLOBIN GLYCOSYLATED A1C: CPT

## 2018-12-07 PROCEDURE — 94003 VENT MGMT INPAT SUBQ DAY: CPT

## 2018-12-07 PROCEDURE — 71045 X-RAY EXAM CHEST 1 VIEW: CPT

## 2018-12-07 PROCEDURE — 94002 VENT MGMT INPAT INIT DAY: CPT

## 2018-12-07 PROCEDURE — 87070 CULTURE OTHR SPECIMN AEROBIC: CPT

## 2018-12-07 PROCEDURE — 87086 URINE CULTURE/COLONY COUNT: CPT

## 2018-12-07 PROCEDURE — 87040 BLOOD CULTURE FOR BACTERIA: CPT

## 2018-12-07 PROCEDURE — 83605 ASSAY OF LACTIC ACID: CPT

## 2018-12-07 PROCEDURE — 83735 ASSAY OF MAGNESIUM: CPT

## 2018-12-07 PROCEDURE — 84145 PROCALCITONIN (PCT): CPT

## 2018-12-07 PROCEDURE — 84484 ASSAY OF TROPONIN QUANT: CPT

## 2018-12-07 PROCEDURE — 36415 COLL VENOUS BLD VENIPUNCTURE: CPT

## 2018-12-07 PROCEDURE — 97163 PT EVAL HIGH COMPLEX 45 MIN: CPT

## 2018-12-07 PROCEDURE — 84100 ASSAY OF PHOSPHORUS: CPT

## 2018-12-07 PROCEDURE — 96365 THER/PROPH/DIAG IV INF INIT: CPT

## 2018-12-07 PROCEDURE — 82550 ASSAY OF CK (CPK): CPT

## 2018-12-07 PROCEDURE — 82803 BLOOD GASES ANY COMBINATION: CPT

## 2018-12-07 PROCEDURE — 85027 COMPLETE CBC AUTOMATED: CPT

## 2018-12-07 PROCEDURE — 93306 TTE W/DOPPLER COMPLETE: CPT

## 2018-12-07 RX ORDER — METFORMIN HYDROCHLORIDE 850 MG/1
1 TABLET ORAL
Qty: 0 | Refills: 0 | COMMUNITY

## 2018-12-07 RX ORDER — PANTOPRAZOLE SODIUM 20 MG/1
40 TABLET, DELAYED RELEASE ORAL
Qty: 1 | Refills: 0 | OUTPATIENT
Start: 2018-12-07 | End: 2019-01-05

## 2018-12-07 RX ORDER — ROSUVASTATIN CALCIUM 5 MG/1
1 TABLET ORAL
Qty: 0 | Refills: 0 | COMMUNITY

## 2018-12-07 RX ORDER — INSULIN GLARGINE 100 [IU]/ML
10 INJECTION, SOLUTION SUBCUTANEOUS
Qty: 1 | Refills: 0 | OUTPATIENT
Start: 2018-12-07 | End: 2019-01-05

## 2018-12-07 RX ORDER — CLOPIDOGREL BISULFATE 75 MG/1
1 TABLET, FILM COATED ORAL
Qty: 30 | Refills: 0 | OUTPATIENT
Start: 2018-12-07 | End: 2019-01-05

## 2018-12-07 RX ORDER — GLIMEPIRIDE 1 MG
1 TABLET ORAL
Qty: 0 | Refills: 0 | COMMUNITY

## 2018-12-07 RX ORDER — ROSUVASTATIN CALCIUM 5 MG/1
1 TABLET ORAL
Qty: 30 | Refills: 0 | OUTPATIENT
Start: 2018-12-07 | End: 2019-01-05

## 2018-12-07 RX ORDER — LEVOTHYROXINE SODIUM 125 MCG
1 TABLET ORAL
Qty: 30 | Refills: 0 | OUTPATIENT
Start: 2018-12-07 | End: 2019-01-05

## 2018-12-07 RX ORDER — MORPHINE SULFATE 50 MG/1
0.25 CAPSULE, EXTENDED RELEASE ORAL
Qty: 15 | Refills: 0 | OUTPATIENT
Start: 2018-12-07 | End: 2018-12-11

## 2018-12-07 RX ORDER — ACETAMINOPHEN 500 MG
650 TABLET ORAL
Qty: 800 | Refills: 0 | OUTPATIENT
Start: 2018-12-07 | End: 2018-12-16

## 2018-12-07 RX ORDER — CARBIDOPA AND LEVODOPA 25; 100 MG/1; MG/1
1 TABLET ORAL
Qty: 150 | Refills: 0 | OUTPATIENT
Start: 2018-12-07 | End: 2019-01-05

## 2018-12-07 RX ORDER — TAMSULOSIN HYDROCHLORIDE 0.4 MG/1
1 CAPSULE ORAL
Qty: 30 | Refills: 0 | OUTPATIENT
Start: 2018-12-07 | End: 2019-01-05

## 2018-12-07 RX ORDER — CLOPIDOGREL BISULFATE 75 MG/1
0 TABLET, FILM COATED ORAL
Qty: 0 | Refills: 0 | COMMUNITY

## 2018-12-07 RX ORDER — IPRATROPIUM/ALBUTEROL SULFATE 18-103MCG
3 AEROSOL WITH ADAPTER (GRAM) INHALATION
Qty: 120 | Refills: 0 | OUTPATIENT
Start: 2018-12-07 | End: 2019-01-05

## 2018-12-07 RX ADMIN — Medication 125 MICROGRAM(S): at 05:24

## 2018-12-07 RX ADMIN — Medication 3 MILLILITER(S): at 01:24

## 2018-12-07 RX ADMIN — Medication 3 MILLILITER(S): at 07:52

## 2018-12-07 RX ADMIN — MORPHINE SULFATE 2 MILLIGRAM(S): 50 CAPSULE, EXTENDED RELEASE ORAL at 13:19

## 2018-12-07 RX ADMIN — CLOPIDOGREL BISULFATE 75 MILLIGRAM(S): 75 TABLET, FILM COATED ORAL at 11:17

## 2018-12-07 RX ADMIN — Medication 4: at 12:48

## 2018-12-07 RX ADMIN — PANTOPRAZOLE SODIUM 40 MILLIGRAM(S): 20 TABLET, DELAYED RELEASE ORAL at 11:17

## 2018-12-07 RX ADMIN — Medication 2: at 05:26

## 2018-12-07 RX ADMIN — MORPHINE SULFATE 2 MILLIGRAM(S): 50 CAPSULE, EXTENDED RELEASE ORAL at 05:26

## 2018-12-07 RX ADMIN — CARBIDOPA AND LEVODOPA 1 TABLET(S): 25; 100 TABLET ORAL at 05:24

## 2018-12-07 RX ADMIN — MORPHINE SULFATE 2 MILLIGRAM(S): 50 CAPSULE, EXTENDED RELEASE ORAL at 05:25

## 2018-12-07 RX ADMIN — HEPARIN SODIUM 5000 UNIT(S): 5000 INJECTION INTRAVENOUS; SUBCUTANEOUS at 05:25

## 2018-12-07 RX ADMIN — CARBIDOPA AND LEVODOPA 1 TABLET(S): 25; 100 TABLET ORAL at 11:16

## 2018-12-07 NOTE — PROGRESS NOTE ADULT - PROBLEM SELECTOR PLAN 9
palliative eval noted
Continue Synthroid.
palliative eval
palliative eval noted
Continue Synthroid.
palliative eval noted

## 2018-12-07 NOTE — PROGRESS NOTE ADULT - PROBLEM SELECTOR PROBLEM 5
Urinary retention
AMEYA (acute kidney injury)
Urinary retention
Hypothyroidism
Urinary retention

## 2018-12-07 NOTE — PROGRESS NOTE ADULT - SUBJECTIVE AND OBJECTIVE BOX
PULMONARY/CRITICAL CARE      INTERVAL HPI/OVERNIGHT EVENTS: Still sob. Poorly responsive. No fever.    78y MaleHPI:  This is an 77 y/o M with PMH of DM, Dyslipidemia, CAD s/p CABG, PPM, Parkinson's Disease with Dementia, Hypothyroidism and BPH who presented with respiratory distress. As per patient's wife at the bedside he was at his baseline condition till 4 days ago when she noticed that he closes his mouth & doesn't allow any thing to be put in his mouth, and she has to use a dripper to feed him. Yesterday she noticed that he is not responding, and today he was breathing fast with audible wheezing, so she brought him to the hospital, no cough, fever, or chills at home, also denies vomiting, no BM over the past 4 days. Wife stated also that patient "lost his voice" a month ago because of his parkinson's disease, but was able to understand and interact non verbally, till yesterday. (25 Nov 2018 23:07)        PAST MEDICAL & SURGICAL HISTORY:  Type 2 diabetes mellitus  Dyslipidemia  Hypothyroidism  Parkinson's disease  CAD (coronary artery disease) of artery bypass graft  Benign essential hypertension  S/P CABG (coronary artery bypass graft)  Pacemaker        ICU Vital Signs Last 24 Hrs  T(C): 36.4 (07 Dec 2018 04:15), Max: 36.9 (06 Dec 2018 16:01)  T(F): 97.6 (07 Dec 2018 04:15), Max: 98.5 (06 Dec 2018 16:01)  HR: 76 (07 Dec 2018 06:31) (66 - 98)  BP: 122/65 (07 Dec 2018 06:31) (120/59 - 156/73)  BP(mean): 82 (07 Dec 2018 06:31) (74 - 96)  ABP: --  ABP(mean): --  RR: 28 (07 Dec 2018 06:31) (25 - 38)  SpO2: 98% (07 Dec 2018 06:31) (96% - 100%)    Qtts:     I&O's Summary    06 Dec 2018 07:01  -  07 Dec 2018 07:00  --------------------------------------------------------  IN: 1785 mL / OUT: 0 mL / NET: 1785 mL      ROS:    CONSTITUTIONAL: No fever, weight loss, or fatigue  RESPIRATORY: No cough, wheezing, chills or hemoptysis; mild shortness of breath  CARDIOVASCULAR: No chest pain, palpitations, dizziness, or leg swelling  GASTROINTESTINAL: No abdominal or epigastric pain. No nausea, vomiting, or hematemesis; No diarrhea or constipation. No melena or hematochezia. Has NG  NEUROLOGICAL: No headaches, has memory loss, loss of strength, no  numbness, or tremors  SKIN: No itching, burning, rashes, or lesions s  MUSCULOSKELETAL: No joint pain or swelling; No muscle, back, or extremity pain, no calf tenderness  PSYCHIATRIC had some agitation          PHYSICAL EXAM:    GENERAL:, well-groomed, well-developed, elderly male mild sob  HEAD:  Atraumatic, Normocephalic  EYES: EOMI, PERRLA, conjunctiva and sclera clear  ENMT: No tonsillar erythema, exudates, or enlargement; Moist mucous membranes, Good dentition, No lesions  NG tube  NECK: Supple, No JVD, Normal thyroid  NERVOUS SYSTEM:  opens eyes, nonverbal, rigid extremities.  CHEST/LUNG: few rhonchi, no wheezing, or rubs Decreased excursion  HEART: Regular rate and rhythm; No murmurs, rubs, or gallops  ABDOMEN: Soft, Nontender, Nondistended; Bowel sounds present Suprapubic catheter intact.  EXTREMITIES:  2+ Peripheral Pulses, No clubbing, cyanosis, or edema  LYMPH: No lymphadenopathy noted  SKIN: No rashes or lesions        LABS:                        10.6   12.91 )-----------( 589      ( 06 Dec 2018 06:47 )             33.5     12-06    137  |  99  |  16  ----------------------------<  206<H>  4.4   |  32<H>  |  0.89    Ca    8.6      06 Dec 2018 06:47            vanco through     RADIOLOGY & ADDITIONAL STUDIES:< from: Xray Chest 1 View- PORTABLE-Routine (12.06.18 @ 06:32) >  EXAM:  XR CHEST PORTABLE ROUTINE 1V                                  PROCEDURE DATE:  12/06/2018          INTERPRETATION:  CLINICAL STATEMENT: Follow-up chest pain.    TECHNIQUE: AP view of the chest.    COMPARISON: 12/4/2018    FINDINGS/  IMPRESSION:  Left cardiac device, sternotomy wires, feeding tube again noted. No   pneumothorax.    Increased interstitial lung markings without significant change.   Opacities right lung without significant change. Biapical pleural   thickening.    Trace bilateral pleural effusions.    Heart size within normal limits.                  INDY DOWNING M.D., ATTENDING RADIOLOGIST  This document has been electronically signed. Dec  6 2018  8:00AM    < end of copied text >        CRITICAL CARE TIME SPENT:

## 2018-12-07 NOTE — PROGRESS NOTE ADULT - PROBLEM SELECTOR PROBLEM 6
AMEYA (acute kidney injury)
Hypothyroidism, unspecified type
Dysphagia, unspecified type
AMEYA (acute kidney injury)
Dysphagia, unspecified type
AMEYA (acute kidney injury)

## 2018-12-07 NOTE — PROGRESS NOTE ADULT - ASSESSMENT
79 y/o M with PMH of DM, Dyslipidemia, CAD s/p CABG, PPM, Parkinson's Disease with Dementia, Hypothyroidism and BPH presented with respiratory distress.   Acute respiratory failure due to pneumonia, ?early ARDS.    Sepsis due to pneumonia with high lactate--improved.  Parkinsonism with dementia  DM with hi glu  . Pneumonia resolving. Mild sob persists.  Family now agreed to  Hospice.  May go home today on home hospice.  Poor mental status.

## 2018-12-07 NOTE — PROGRESS NOTE ADULT - PROBLEM SELECTOR PLAN 1
fu cxr
Hospice  DNR
aspiration precautions  s & s  monitor elytes
aspiration precautions  s & s noted  monitor myrna jiang son -- they don't want peg at this time
aspiration precautions  s & s noted  monitor myrna jiang son -- they don't want peg at this time  home hospice
Hospice
Minimal elevation of serum troponin in the setting of high fever, renal insufficiency, and critical illness likely represents demand injury in the setting of known CAD.  Presentation is not consistent with ACS.
Patient with Severe sepsis, POA.  Most likely due to Aspiration Pneumonia and UTI.  Improved.
awaiting culture
-continue ertapenem  -ID following
fu cxr  Hospice eval
-Pt has failed BIPAP with worsening work of breathing and increased accessory muscle use   - Now post intubation and currently on Full vent support  -titrate settings to maintain SaO2 >90%, or pH >7.25  -consider low titdal volume ventilation strategy w/ goal Tv 4-6 cc/kg of ideal body weight  -plateu pressure goal <30  -Peridex oral care and VAP prophylaxis with HOB 30 degrees   -aggressive chest PT and suctioning   -daily sedation vacation with spontaneous breathing trial if clinical condition warrants, discuss with respiratory therapy
awaiting culture
Patient with Severe sepsis, POA.  Most likely due to Aspiration Pneumonia and UTI.  Improved.

## 2018-12-07 NOTE — PROGRESS NOTE ADULT - PROBLEM/PLAN-8
DISPLAY PLAN FREE TEXT
Universal Safety Interventions
DISPLAY PLAN FREE TEXT

## 2018-12-07 NOTE — PROGRESS NOTE ADULT - REASON FOR ADMISSION
Respiratory distress.

## 2018-12-07 NOTE — PROGRESS NOTE ADULT - PROBLEM SELECTOR PROBLEM 1
Dysphagia
PNA (pneumonia)
Elevated troponin
PNA (pneumonia)
PNA (pneumonia)
Sepsis, due to unspecified organism
Aspiration pneumonia of right lower lobe, unspecified aspiration pneumonia type
PNA (pneumonia)
Acute respiratory failure with hypoxia
PNA (pneumonia)
PNA (pneumonia)

## 2018-12-07 NOTE — PROGRESS NOTE ADULT - SUBJECTIVE AND OBJECTIVE BOX
Patient is a 78y old  Male who presents with a chief complaint of Respiratory distress. (04 Dec 2018 11:31)    HPI:  This is an 79 y/o M with PMH of DM, Dyslipidemia, CAD s/p CABG, PPM, Parkinson's Disease with Dementia, Hypothyroidism and BPH who presented with respiratory distress. As per patient's wife at the bedside he was at his baseline condition till 4 days ago when she noticed that he closes his mouth & doesn't allow any thing to be put in his mouth, and she has to use a dripper to feed him. Yesterday she noticed that he is not responding, and today he was breathing fast with audible wheezing, so she brought him to the hospital, no cough, fever, or chills at home, also denies vomiting, no BM over the past 4 days. Wife stated also that patient "lost his voice" a month ago because of his parkinson's disease, but was able to understand and interact non verbally, till yesterday. (25 Nov 2018 23:07)    INTERVAL HPI/OVERNIGHT EVENTS:  Chart reviewed, notes reviewed.   Patient seen and examined.  Being followed by following specialists: ID, Pulm/CC, Cardiology, GI.     Consultant(s) Notes Reviewed:  [X] Yes    Care Discussed with Consultants/Other Providers: [X] Yes    12/04/18 --> Patient admitted to hospital with sepsis, septic shock, respiratory failure, pneumonia, AMEYA and urinary retention. Patient slowly improved. He also was found to have UTI due to ESBL. Patient was extubated and was downgraded.  He is transferred to my service. Patient remains minimally responsive and non verbal. He failed his speech and swallow eval. He is confused and pulls out the tubes and lines. He pulled out his NG tube last night and it had to be replaced. Patient again pulled out NG tube partially. Son is at bedside.   12/05/18 --> Remains nonverbal. He gets restless at times and pulls on tubes. Not being able to redirect. Seen by Hospice today and accepted to home hospice program.     12/06/18 --> More awake and alert today. Tries to pull out on tubes and lines. He has been voiding and been incontinent of urine despite having suprapubic tube.     12/07/08 --> No new issues. For home hospice today.     REVIEW OF SYSTEMS: Unable to obtain ROS as patient is non verbal and not able to provide any history. It is as per HPI, rest is negative.    Allergies: No Known Allergies    Intolerances    MEDICATIONS  (STANDING):  ALBUTerol/ipratropium for Nebulization 3 milliLiter(s) Nebulizer every 6 hours  atorvastatin 40 milliGRAM(s) Oral at bedtime  carbidopa/levodopa  25/100 1 Tablet(s) Oral <User Schedule>  clopidogrel Tablet 75 milliGRAM(s) Oral daily  dextrose 5%. 1000 milliLiter(s) (50 mL/Hr) IV Continuous <Continuous>  dextrose 50% Injectable 12.5 Gram(s) IV Push once  dextrose 50% Injectable 25 Gram(s) IV Push once  dextrose 50% Injectable 25 Gram(s) IV Push once  heparin  Injectable 5000 Unit(s) SubCutaneous every 12 hours  insulin glargine Injectable (LANTUS) 10 Unit(s) SubCutaneous at bedtime  insulin lispro (HumaLOG) corrective regimen sliding scale   SubCutaneous every 6 hours  levothyroxine 125 MICROGram(s) Enteral Tube daily  pantoprazole   Suspension 40 milliGRAM(s) Enteral Tube daily  tamsulosin 0.4 milliGRAM(s) Oral at bedtime    MEDICATIONS  (PRN):  acetaminophen    Suspension .. 650 milliGRAM(s) Oral every 6 hours PRN Temp greater or equal to 38.5C (101.3F), Mild Pain (1 - 3)  dextrose 40% Gel 15 Gram(s) Oral once PRN Blood Glucose LESS THAN 70 milliGRAM(s)/deciliter  glucagon  Injectable 1 milliGRAM(s) IntraMuscular once PRN Glucose LESS THAN 70 milligrams/deciliter  morphine  - Injectable 2 milliGRAM(s) IV Push every 4 hours PRN Pain or Dyspnea    Vital Signs Last 24 Hrs  T(C): 36.7 (07 Dec 2018 08:09), Max: 36.9 (06 Dec 2018 16:01)  T(F): 98 (07 Dec 2018 08:09), Max: 98.5 (06 Dec 2018 16:01)  HR: 66 (07 Dec 2018 12:00) (66 - 98)  BP: 118/56 (07 Dec 2018 12:00) (118/56 - 156/73)  BP(mean): 75 (07 Dec 2018 12:00) (74 - 96)  RR: 27 (07 Dec 2018 12:00) (25 - 38)  SpO2: 99% (07 Dec 2018 12:00) (96% - 100%)    PHYSICAL EXAM:  GENERAL: Thin built male in no acute distress.   HEAD:  Atraumatic, Normocephalic  EYES: Pallor +  ENMT: NG tube noted.   NECK: Supple,   CHEST/LUNG: Decreased breath sounds at bases. occasional rhonchi +  HEART: S1, S2, SM +  ABDOMEN: Soft,  Bowel sounds present. Suprapubic Cath noted.   EXTREMITIES:  2+ Peripheral Pulses, No clubbing, cyanosis, or edema  MS: No joint swelling or deformity.   LYMPH: No lymphadenopathy noted  SKIN: No rashes or lesions  NERVOUS SYSTEM: minimally responsive.   PSYCH: Non verbal.     LABS:                       10.6   12.91 )-----------( 589      ( 06 Dec 2018 06:47 )             33.5     06 Dec 2018 06:47    137    |  99     |  16     ----------------------------<  206    4.4     |  32     |  0.89     Ca    8.6        06 Dec 2018 06:47      CAPILLARY BLOOD GLUCOSE  POCT Blood Glucose.: 202 mg/dL (07 Dec 2018 12:18)  POCT Blood Glucose.: 165 mg/dL (07 Dec 2018 05:16)  POCT Blood Glucose.: 190 mg/dL (06 Dec 2018 23:41)  POCT Blood Glucose.: 197 mg/dL (06 Dec 2018 18:09)      11-27 [id number] 8.2  11-26 [id number] 8.5    RADIOLOGY TEST: (IMAGES REVIEWED BY ME)  < from: Xray Chest 1 View- PORTABLE-Urgent (12.04.18 @ 14:27) >  EXAM:  XR CHEST PORTABLE URGENT 1V                        PROCEDURE DATE:  12/04/2018    INTERPRETATION:  CLINICAL STATEMENT: NG tube.    TECHNIQUE: AP view of the lower chest.    FINDINGS:  NG tube noted with tip under the diaphragm overlying expected region of   stomach.    IMPRESSION:  NG tube as described above.     < end of copied text >    < from: Xray Chest 1 View- PORTABLE-Routine (12.04.18 @ 12:45) >  EXAM:  XR CHEST PORTABLE ROUTINE 1V                        PROCEDURE DATE:  12/04/2018    INTERPRETATION:  CLINICAL STATEMENT: Patient removed NG tube    TECHNIQUE: AP view of the chest.    COMPARISON: 12/3/2018    FINDINGS/  IMPRESSION:  Tip of NG tube overlies proximal esophagus. Repositioning recommended.    Sternotomy wires, left cardiac device again noted. Increased interstitial   lung markings without significant change. Mild opacities right lung base   slightly improving.    No significant pleural effusion.    Curvilinear density overlies lateral right upper lung zone which may be   related to overlying soft tissue/skin fold.    Heart size cannot be accurately assessed in this projection.    Special service nurse notified 12/4/2018 at 12:56 PM    < end of copied text >    Imaging Personally Reviewed:  [X] YES        HEALTH ISSUES - PROBLEM Dx:  Dysphagia: Dysphagia  Elevated troponin: Elevated troponin  Need for prophylactic measure: Need for prophylactic measure  CAD (coronary artery disease): CAD (coronary artery disease)  Coagulopathy: Coagulopathy  AMEYA (acute kidney injury): AMEYA (acute kidney injury)  Acute respiratory failure: Acute respiratory failure  Sepsis: Sepsis  PNA (pneumonia): PNA (pneumonia)  Urinary retention: Urinary retention  Parkinson's disease: Parkinson&#x27;s disease  Hypothyroidism, unspecified type: Hypothyroidism, unspecified type  Diabetes mellitus: Diabetes mellitus  Pneumonia: Pneumonia  Sepsis, due to unspecified organism: Sepsis, due to unspecified organism  Acute respiratory failure with hypoxia: Acute respiratory failure with hypoxia

## 2018-12-07 NOTE — PROGRESS NOTE ADULT - ATTENDING COMMENTS
For home Hospice today. Discharge paperwork completed.        I spent more than 35 minutes on discharging the patient.

## 2018-12-07 NOTE — PROGRESS NOTE ADULT - PROBLEM SELECTOR PROBLEM 9
Parkinson's disease
Hypothyroidism, unspecified type
Parkinson's disease
Parkinson's disease
Hypothyroidism, unspecified type
Parkinson's disease

## 2018-12-07 NOTE — PROGRESS NOTE ADULT - PROBLEM SELECTOR PROBLEM 3
Sepsis, due to unspecified organism
Acute respiratory failure
Type 2 diabetes mellitus with complication, with long-term current use of insulin
Acute respiratory failure
Acute respiratory failure with hypoxia
Type 2 diabetes mellitus with complication, with long-term current use of insulin
Dysphagia, unspecified type
Acute respiratory failure
Acute respiratory failure

## 2018-12-07 NOTE — PROGRESS NOTE ADULT - PROVIDER SPECIALTY LIST ADULT
Cardiology
Critical Care
Gastroenterology
Hospitalist
Infectious Disease
Internal Medicine
MICU
Nephrology
Critical Care

## 2018-12-07 NOTE — PROGRESS NOTE ADULT - PROBLEM SELECTOR PROBLEM 7
Coagulopathy
CAD (coronary artery disease)
Coagulopathy
Coagulopathy
CAD (coronary artery disease)
Coagulopathy

## 2018-12-07 NOTE — PROGRESS NOTE ADULT - PROBLEM SELECTOR PROBLEM 4
R/O DM2 (diabetes mellitus, type 2)
Diabetes mellitus
R/O DM2 (diabetes mellitus, type 2)
Aspiration pneumonia of right lower lobe, unspecified aspiration pneumonia type
R/O DM2 (diabetes mellitus, type 2)
Aspiration pneumonia of right lower lobe, unspecified aspiration pneumonia type
R/O DM2 (diabetes mellitus, type 2)
R/O DM2 (diabetes mellitus, type 2)
Parkinson disease
R/O DM2 (diabetes mellitus, type 2)

## 2018-12-07 NOTE — PROGRESS NOTE ADULT - SUBJECTIVE AND OBJECTIVE BOX
INTERVAL HPI/OVERNIGHT EVENTS:  No new overnight event.  No N/V/D.  Tolerating diet via ngt  MEDICATIONS  (STANDING):  ALBUTerol/ipratropium for Nebulization 3 milliLiter(s) Nebulizer every 6 hours  atorvastatin 40 milliGRAM(s) Oral at bedtime  carbidopa/levodopa  25/100 1 Tablet(s) Oral <User Schedule>  clopidogrel Tablet 75 milliGRAM(s) Oral daily  dextrose 5%. 1000 milliLiter(s) (50 mL/Hr) IV Continuous <Continuous>  dextrose 50% Injectable 12.5 Gram(s) IV Push once  dextrose 50% Injectable 25 Gram(s) IV Push once  dextrose 50% Injectable 25 Gram(s) IV Push once  heparin  Injectable 5000 Unit(s) SubCutaneous every 12 hours  insulin glargine Injectable (LANTUS) 10 Unit(s) SubCutaneous at bedtime  insulin lispro (HumaLOG) corrective regimen sliding scale   SubCutaneous every 6 hours  levothyroxine 125 MICROGram(s) Enteral Tube daily  pantoprazole   Suspension 40 milliGRAM(s) Enteral Tube daily  tamsulosin 0.4 milliGRAM(s) Oral at bedtime    MEDICATIONS  (PRN):  acetaminophen    Suspension .. 650 milliGRAM(s) Oral every 6 hours PRN Temp greater or equal to 38.5C (101.3F), Mild Pain (1 - 3)  dextrose 40% Gel 15 Gram(s) Oral once PRN Blood Glucose LESS THAN 70 milliGRAM(s)/deciliter  glucagon  Injectable 1 milliGRAM(s) IntraMuscular once PRN Glucose LESS THAN 70 milligrams/deciliter  morphine  - Injectable 2 milliGRAM(s) IV Push every 4 hours PRN Pain or Dyspnea      Allergies    No Known Allergies    Intolerances          General:  No wt loss, fevers, chills, night sweats, fatigue,   Eyes:  Good vision, no reported pain  ENT:  No sore throat, pain, runny nose, dysphagia  CV:  No pain, palpitations, hypo/hypertension  Resp:  No dyspnea, cough, tachypnea, wheezing  GI:  No pain, No nausea, No vomiting, No diarrhea, No constipation, No weight loss, No fever, No pruritis, No rectal bleeding, No tarry stools, No dysphagia,  :  No pain, bleeding, incontinence, nocturia  Muscle:  No pain, weakness  Neuro:  No weakness, tingling, memory problems  Psych:  No fatigue, insomnia, mood problems, depression  Endocrine:  No polyuria, polydipsia, cold/heat intolerance  Heme:  No petechiae, ecchymosis, easy bruisability  Skin:  No rash, tattoos, scars, edema      PHYSICAL EXAM:   Vital Signs:  Vital Signs Last 24 Hrs  T(C): 36.8 (07 Dec 2018 12:44), Max: 36.8 (07 Dec 2018 12:44)  T(F): 98.2 (07 Dec 2018 12:44), Max: 98.2 (07 Dec 2018 12:44)  HR: 66 (07 Dec 2018 12:00) (66 - 98)  BP: 118/56 (07 Dec 2018 12:00) (118/56 - 156/73)  BP(mean): 75 (07 Dec 2018 12:00) (74 - 96)  RR: 27 (07 Dec 2018 12:00) (26 - 38)  SpO2: 99% (07 Dec 2018 12:00) (98% - 100%)  Daily     Daily Weight in k.9 (07 Dec 2018 04:15)I&O's Summary    06 Dec 2018 07:01  -  07 Dec 2018 07:00  --------------------------------------------------------  IN: 1870 mL / OUT: 0 mL / NET: 1870 mL    07 Dec 2018 07:01  -  07 Dec 2018 17:31  --------------------------------------------------------  IN: 425 mL / OUT: 0 mL / NET: 425 mL        GENERAL:  Appears stated age, well-groomed, well-nourished, no distress  HEENT:  NC/AT,  conjunctivae clear and pink, no thyromegaly, nodules, adenopathy, no JVD, sclera -anicteric  CHEST:  Full & symmetric excursion, no increased effort, breath sounds clear  HEART:  Regular rhythm, S1, S2, no murmur/rub/S3/S4, no abdominal bruit, no edema  ABDOMEN:  Soft, non-tender, non-distended, normoactive bowel sounds,  no masses ,no hepato-splenomegaly, no signs of chronic liver disease  EXTEREMITIES:  no cyanosis,clubbing or edema  SKIN:  No rash/erythema/ecchymoses/petechiae/wounds/abscess/warm/dry  NEURO:  Alert, oriented, no asterixis, no tremor, no encephalopathy      LABS:                        10.6   12.91 )-----------( 589      ( 06 Dec 2018 06:47 )             33.5     12-    137  |  99  |  16  ----------------------------<  206<H>  4.4   |  32<H>  |  0.89    Ca    8.6      06 Dec 2018 06:47          amylase   lipase  RADIOLOGY & ADDITIONAL TESTS:

## 2018-12-07 NOTE — PROGRESS NOTE ADULT - PROBLEM SELECTOR PLAN 2
Initial broad spectrum coverage for MDR orgamsims including staph aureus and gram negatives.    Follow up sputum cultre the narrow specrtum based on culture  sensitivities
antibiotics as per ID  Cautious fluids
Off antibiotics  Supportive care
Off antibiotics  Supportive care
Patient with acute respiratory failure with hypoxemia, POA.   Improved.   Extubated.   Continue oxygen supplementation.
S/p remote CABG; continue medical management.
antibiotics   Cautious fluids
-difficult to manage due to age and dysphagia as benzos not appropriate for his age and wellbutrin cannot be crushed down ng tube. will try low dose seroquel, monitor for neuro symptom changes due to possible interaction with sinemet due to conflicting mechanism of action
antibiotics as per ID  Cautious fluids
Patient with acute respiratory failure with hypoxemia, POA.   Improved.   Extubated.   Continue oxygen supplementation.
antibiotics as per ID  Cautious fluids

## 2018-12-07 NOTE — PROGRESS NOTE ADULT - PROBLEM SELECTOR PLAN 4
watch accuchecks  ketone neg
Hold oral hypoglycmeic meds  Regular Insulin Slide Scale  Finger sticks Q 6 hours  Low Carb 1800 Kush Diet when can tolorate  Hemoglobin A1c
watch accuchecks  ketone neg
ketone neg
Patient with pneumonia, RLL, most likely aspiration pneumonia due to dysphagia, POA.   Improving as per chest x-ray.   Completed course of antibiotics.
Patient with pneumonia, RLL, most likely aspiration pneumonia due to dysphagia, POA.   Improving as per chest x-ray.   Completed course of antibiotics.
Patient with pneumonia, RLL, most likely aspiration pneumonia due to dysphagia, POA.   Improving as per chest x-ray.   Continue IV antibiotics as per ID.
ketone neg
watch accuchecks  ketone neg
-continue sinemet  -neuro following  -poor prognosis
watch accuchecks  ketone neg
Patient with pneumonia, RLL, most likely aspiration pneumonia due to dysphagia, POA.   Improving as per chest x-ray.   Continue IV antibiotics as per ID.

## 2018-12-07 NOTE — PROGRESS NOTE ADULT - PROBLEM SELECTOR PROBLEM 8
CAD (coronary artery disease)
Parkinson's disease
CAD (coronary artery disease)
Parkinson's disease
CAD (coronary artery disease)

## 2019-03-29 NOTE — PATIENT PROFILE ADULT - VISION (WITH CORRECTIVE LENSES IF THE PATIENT USUALLY WEARS THEM):
Partially impaired: cannot see medication labels or newsprint, but can see obstacles in path, and the surrounding layout; can count fingers at arm's length
none

## 2019-07-30 NOTE — ED PROVIDER NOTE - SCRIBE NAME
1. Return for fasting blood work.   2. Start taking chantix to help you quit smoking.   3. I will place a referral with podiatry to have them evaluate your chronic left foot pain.   4. Follow up in one year   Anni Rodarte

## 2019-10-02 NOTE — PROGRESS NOTE ADULT - SUBJECTIVE AND OBJECTIVE BOX
Called to inform patient of recommendation from radiologist. Radiologist would like to see an MRI of the Thoracic spine with iv contrast, and MRI of the Thoracic spine without iv contrast, and a routine MRI of the Lumbar spine without iv contrast. Patient is aware he is to call PCP to have these orders sent over to IR if wishing to proceed.   ID Progress note     Name: SHON HAMEED  Age: 78y  Gender: Male  MRN: [id number]    Interval History-- Events noted, doing well, afebrile . resting comfortably post extubation . Await speech therapy evaluation   Notes reviewed    Past Medical History--  Type 2 diabetes mellitus  Dyslipidemia  Hypothyroidism  Parkinson's disease  Diabetes mellitus  CAD (coronary artery disease) of artery bypass graft  Benign essential hypertension  Adult hypothyroidism  Acquired hypothyroidism  S/P CABG (coronary artery bypass graft)  Pacemaker      For details regarding the patient's social history, family history, and other miscellaneous elements, please refer the initial infectious diseases consultation and/or the admitting history and physical examination for this admission.    Allergies--  Allergies    No Known Allergies    Intolerances        Medications--  Antibiotics:  ertapenem  IVPB      ertapenem  IVPB 1000 milliGRAM(s) IV Intermittent every 24 hours    Immunologic:    Other:  acetaminophen    Suspension .. PRN  ALBUTerol/ipratropium for Nebulization  aspirin  atorvastatin  carbidopa/levodopa CR 25/100  clopidogrel Tablet  dextrose 40% Gel PRN  dextrose 5%.  dextrose 50% Injectable  dextrose 50% Injectable  dextrose 50% Injectable  glucagon  Injectable PRN  heparin  Injectable  insulin glargine Injectable (LANTUS)  insulin lispro (HumaLOG) corrective regimen sliding scale  levothyroxine  pantoprazole   Suspension      Review of Systems--  Review of systems unable to be obtained secondary to clinical condition.    Physical Examination--    Vital Signs: T(F): 98.1 (12-03-18 @ 08:27), Max: 98.6 (12-02-18 @ 20:01)  HR: 65 (12-03-18 @ 08:00)  BP: 148/68 (12-03-18 @ 08:00)  RR: 29 (12-03-18 @ 08:00)  SpO2: 100% (12-03-18 @ 08:00)  Wt(kg): --  General: Nontoxic-appearing Male in no acute distress.  HEENT: AT/NC. PERRL. EOMI. Anicteric. Conjunctiva pink and moist. Oropharynx retained secretions,  Dentition fair.  Neck: Not rigid. No sense of mass.  Nodes: None palpable.  Lungs: Coarse breath sounds  bilaterally without rales, wheezing or rhonchi  Heart: Regular rate and rhythm. No Murmur. No rub. No gallop. No palpable thrill.  Abdomen: Bowel sounds present and normoactive. Soft. Nondistended. Nontender. No sense of mass. No organomegaly.  Back: No spinal tenderness. No costovertebral angle tenderness.   Extremities: No cyanosis or clubbing. No edema.   Skin: Warm. Dry. Good turgor. No rash. No vasculitic stigmata.  Psychiatric: Appropriate affect and mood for situation.   Laboratory Studies--  CBC                        10.9   11.55 )-----------( 421      ( 03 Dec 2018 06:46 )             32.9       Chemistries  12-03    134<L>  |  100  |  15  ----------------------------<  263<H>  3.8   |  28  |  0.93    Ca    8.4      03 Dec 2018 06:46  Phos  4.0     12-03  Mg     2.0     12-03    TPro  6.0  /  Alb  1.8<L>  /  TBili  0.4  /  DBili  x   /  AST  44<H>  /  ALT  54  /  AlkPhos  143<H>  12-03    CAPILLARY BLOOD GLUCOSE      POCT Blood Glucose.: 267 mg/dL (03 Dec 2018 06:08)  POCT Blood Glucose.: 230 mg/dL (02 Dec 2018 23:15)  POCT Blood Glucose.: 249 mg/dL (02 Dec 2018 17:15)  POCT Blood Glucose.: 251 mg/dL (02 Dec 2018 11:58)    Culture Data    Culture - Sputum (collected 29 Nov 2018 11:18)  Source: .Sputum Sputum/TRAP  Gram Stain (29 Nov 2018 13:43):    No polymorphonuclear leukocytes per low power field    No Squamous epithelial cells per low power field    No organisms seen per oil power field  Final Report (01 Dec 2018 09:26):    Moderate Klebsiella pneumoniae    Moderate Escherichia coli ESBL    Normal Respiratory Beatriz absent  Organism: Klebsiella pneumoniae  Escherichia coli ESBL (01 Dec 2018 09:26)  Organism: Escherichia coli ESBL (01 Dec 2018 09:26)  Organism: Klebsiella pneumoniae (01 Dec 2018 09:26)    Culture - Urine (collected 28 Nov 2018 10:34)  Source: .Urine Catheterized  Final Report (29 Nov 2018 11:24):    No growth    Culture - Sputum (collected 26 Nov 2018 17:15)  Source: .Sputum Sputum  Gram Stain (26 Nov 2018 21:35):    Moderate polymorphonuclear leukocytes per low power field    Rare Squamous epithelial cells per low power field    Moderate Gram positive cocci in pairs per oil power field    Few Gram Negative Rods per oil power field  Final Report (29 Nov 2018 18:18):    Moderate Klebsiella pneumoniae    Moderate Escherichia coli ESBL    Normal Respiratory Beatriz present  Organism: Klebsiella pneumoniae  Escherichia coli ESBL (29 Nov 2018 18:18)  Organism: Escherichia coli ESBL (29 Nov 2018 18:18)  Organism: Klebsiella pneumoniae (29 Nov 2018 18:18)        Radiology:  Xray Chest 1 View- PORTABLE-Routine (12.02.18 @ 07:00) >  Interval removal of endotracheal tube. Nasogastric tube is again   visualized with tip below the GE junction, likely in the stomach.  Stable right lower lobe airspace opacity. New blunting of the left   costophrenic angle likely due to a small pleural effusion.  Left-sided dual-lead cardiac device again seen over the upper chest wall   with distal leads intact. The cardiomediastinal silhouette is magnified   on the projection.  Status post median sternotomy. Intact sternotomy wires. There are no   acute osseous findings.    IMPRESSION:  Extubated. Stable right lower lobe pneumonia. New small left pleural   effusion.    Assessment--  79 y/o M with PMH of DM, Dyslipidemia, CAD s/p CABG, PPM, Parkinson's Disease with Dementia, Hypothyroidism and BPH who presented with respiratory distress.  Has acute hypoxic respiratory failure secondary to  pneumonia likely aspiration as he has parkinson's diseases. Could have severe CAP too He was febrile and hypoxic , likely has severe sepsis with septic shock. He has been not on any pressors . Based on his presentation he will likely have positive blood cs   He likely has Gram negative pneumonia secondary to aspiration . Doubt its legionella., his urine legionella antigen is negative     he has poorly controlled DM    Sputum cs shows Klebsiella and ESBL E coli     he is s/p extubation , doing well,     Plan:  - will continue with  IV Invanz 1 gram daily as he has ESBL E coli  in sputum   - s/p extubation yesterday  , family may consider DNI   - Await speech therapy evaluation , most likely will fail and  may need PEG , family likely to refuse PEG but want to continue with NGT feeds till seen by speech therapy   - serial CXR  - overall prognosis poor    Continue with present regime .  Appropriate use of antibiotics and adverse effects reviewed.    I have discussed the above plan of care with patient 's family in detail. They expressed understanding of the treatment plan . Risks, benefits and alternatives discussed in detail. I have asked if they have any questions or concerns and appropriately addressed them to the best of my ability .      > 35 minutes spent in direct patient care reviewing  the notes, lab data/ imaging , discussion with multidisciplinary team. All questions were addressed and answered to the best of my capacity .    Thank you for allowing me to participate in the care of your patient .        Stu Cage MD  [phone]

## 2020-02-12 NOTE — DISCHARGE NOTE ADULT - ADMISSION DATE +STARTOFVISITDATE
Spoke with patients mother Vale Vital to provide update on patients status  Acknowledged understanding and states she spoke with CW who she is expecting to hear from by 2100 this evening  Will continue to monitor        Blake Guzman RN  02/11/20 1939 Statement Selected

## 2020-11-06 NOTE — PATIENT PROFILE ADULT - FUNCTIONAL SCREEN CURRENT LEVEL: EATING, MLM
Subjective:    Patient ID:  Michoacano Lind is a 60 y.o. male who presents for follow-up of Coronary Artery Disease      HPI  Michoacano Lind is a 59 y.o. 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 He was admitted to an [facility] with angina in 2017 and a subsequent coronary angiogram revealed multivessel CAD. He was recommended for surgery but Dr. Espitia's note states that Dr. Disla ([facility]) reviewed his angiograms and determined that he did not have good targets for revascularization. At that time he transferred his cardiology care to [facility].  He was last seen in this clinic on 10/24/18 and at that time, complained of one episode of angina while moving furniture. He underwent exercise nuclear stress test on 18 and it was negative for ischemia. Since then he has not had any episodes of angina until 2 months ago. 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 This symptom is relieved with 5-10 minutesof rest. He states it is the same feeling he experienced prior to his intial diagnosis of CAD. . He denies shortness of breath, LE edema, orthopnea, PND, palpitations, syncope or near syncope. He has also had issues with glycemic control; his most recent A1c was 8.9 His PCP has been adjusting his medications, but the most recent injectable medication he tried gave him side effects. He does report compliance with a heart healthy diet and eats a vegetarian diet. He checks his blood pressure at home and it runs 130-150 systolic.   Of note, his father had a history of a MI at age 55 which he  from. His brother was diagnosed with  CAD at age of 59. He is a former smoker (quit many years ago).     Past Medical History:   Diagnosis Date    Coronary artery disease involving native  coronary artery of native heart without angina pectoris 7/29/2017    Diabetes mellitus, type II     HTN (hypertension)     Mixed hyperlipidemia 10/24/2019    Ulcerative colitis      Past Surgical History:   Procedure Laterality Date    CATARACT EXTRACTION Bilateral 2013    COLONOSCOPY N/A 1/22/2016    Procedure: COLONOSCOPY;  Surgeon: Aristeo Lynn Jr., MD;  Location: [facility] ENDO;  Service: Endoscopy;  Laterality: N/A;    COLONOSCOPY N/A 10/13/2020    Procedure: COLONOSCOPY;  Surgeon: Charly Travis MD;  Location: [facility] ENDO;  Service: Endoscopy;  Laterality: N/A;     Current Outpatient Medications on File Prior to Visit   Medication Sig Dispense Refill    amLODIPine (NORVASC) 5 MG tablet Take 1 tablet (5 mg total) by mouth once daily. 30 tablet 11    aspirin (ECOTRIN) 81 MG EC tablet Take 81 mg by mouth once daily.      clopidogrel (PLAVIX) 75 mg tablet Take 1 tablet (75 mg total) by mouth once daily. 90 tablet 4    empagliflozin (JARDIANCE) 25 mg Tab Take 25 mg by mouth once daily. 90 tablet 3    glipiZIDE (GLUCOTROL) 10 MG tablet Take 1 tablet (10 mg total) by mouth 2 (two) times daily before meals. 180 tablet 3    losartan (COZAAR) 100 MG tablet Take 1 tablet (100 mg total) by mouth once daily. 90 tablet 3    mesalamine (LIALDA) 1.2 gram TbEC Take 4 tablets (4.8 g total) by mouth daily with breakfast. 120 tablet 11    metFORMIN (GLUCOPHAGE-XR) 500 MG ER 24hr tablet Take 2 tablets (1,000 mg total) by mouth 2 (two) times daily with meals. 180 tablet 3    metoprolol succinate (TOPROL-XL) 50 MG 24 hr tablet Take 1 tablet (50 mg total) by mouth once daily. 90 tablet 3    pantoprazole (PROTONIX) 40 MG tablet TAKE ONE TABLET BY MOUTH ONCE DAILY 30 tablet 9    rosuvastatin (CRESTOR) 40 MG Tab Take 1 tablet (40 mg total) by mouth every evening. 90 tablet 3    blood-glucose meter Misc Test qd 1 each 0    mesalamine (ROWASA) 4 gram/60 mL Enem Place 60 mLs (4 g total) rectally every evening.  (Patient not taking: Reported on 2020) 1800 mL 11    nitroGLYCERIN (NITROSTAT) 0.4 MG SL tablet Place 1 tablet (0.4 mg total) under the tongue every 5 (five) minutes as needed for Chest pain. (Patient not taking: Reported on 2020) 30 tablet 1    SUPREP BOWEL PREP KIT 17.5-3.13-1.6 gram SolR       TRUE METRIX GLUCOSE TEST STRIP Strp USE TO TEST ONCE DAILY 1150 strip 11     No current facility-administered medications on file prior to visit.      Review of patient's allergies indicates:   Allergen Reactions    Aspirin     Lisinopril Other (See Comments)     Cough 2/2 aceI    Aspirin      Other^bloody diarrhea    Bydureon [exenatide microspheres]      Rash, loss of appetite.      Social History     Tobacco Use    Smoking status: Former Smoker     Quit date: 3/16/2008     Years since quittin.6    Smokeless tobacco: Former User     Quit date: 2009   Substance Use Topics    Alcohol use: Not Currently     Alcohol/week: 2.0 standard drinks     Types: 1 Shots of liquor, 1 Glasses of wine per week     Frequency: Monthly or less     Drinks per session: 1 or 2     Binge frequency: Never     Comment: occasionally    Drug use: No     Family History   Problem Relation Age of Onset    Coronary artery disease Mother 75    Coronary artery disease Father 55    Coronary artery disease Brother 59    Diabetes Brother     Prostate cancer Neg Hx     Colon cancer Neg Hx        Review of Systems   Constitution: Negative for decreased appetite, diaphoresis, fever, malaise/fatigue, weight gain and weight loss.   HENT: Negative for congestion, nosebleeds and sore throat.    Eyes: Negative for blurred vision, vision loss in left eye, vision loss in right eye and visual disturbance.   Cardiovascular: Positive for chest pain. Negative for claudication, dyspnea on exertion, leg swelling, near-syncope, orthopnea, palpitations, paroxysmal nocturnal dyspnea and syncope.   Respiratory: Negative for cough,  "hemoptysis, shortness of breath and wheezing.    Endocrine: Negative for polyuria.   Hematologic/Lymphatic: Does not bruise/bleed easily.   Skin: Negative for nail changes and rash.   Musculoskeletal: Negative for back pain, muscle cramps and myalgias.   Gastrointestinal: Negative for abdominal pain, change in bowel habit, diarrhea, heartburn, hematemesis, hematochezia, melena, nausea and vomiting.   Genitourinary: Negative for bladder incontinence, dysuria, frequency and hematuria.   Psychiatric/Behavioral: Negative for depression.   Allergic/Immunologic: Negative for hives.        Objective:  Vitals:    11/05/20 1042 11/05/20 1045   BP: (!) 163/81 (!) 162/87   BP Location: Left arm Right arm   Patient Position: Sitting Sitting   BP Method: Large (Automatic) Large (Automatic)   Pulse: 77 79   SpO2: 100%    Weight: 71.9 kg (158 lb 8.2 oz)    Height: 5' 6" (1.676 m)          Physical Exam   Constitutional: He is oriented to person, place, and time. He appears well-developed and well-nourished.   HENT:   Head: Normocephalic and atraumatic.   Eyes: Pupils are equal, round, and reactive to light. EOM are normal.   Neck: Neck supple. No JVD present. No thyromegaly present.   Cardiovascular: Normal rate and regular rhythm. PMI is displaced. Exam reveals no gallop and no friction rub.   No murmur heard.  Pulses:       Carotid pulses are 2+ on the right side and 2+ on the left side.       Radial pulses are 2+ on the right side and 2+ on the left side.        Femoral pulses are 2+ on the right side and 2+ on the left side.       Dorsalis pedis pulses are 2+ on the right side and 2+ on the left side.        Posterior tibial pulses are 2+ on the right side and 2+ on the left side.   Normal right radial Luis E's test.   Pulmonary/Chest: Effort normal. He has no wheezes. He has no rhonchi. He has no rales.   Abdominal: Soft. Normal appearance. He exhibits no distension. There is no hepatosplenomegaly. There is no abdominal " tenderness.   Neurological: He is alert and oriented to person, place, and time. Gait normal.   Skin: Skin is warm and dry. No rash noted. No erythema.   Psychiatric: He has a normal mood and affect.         Assessment:       1. Angina of effort    2. Cardiomyopathy, ischemic    3. Coronary artery disease involving native coronary artery of native heart with angina pectoris    4. Essential hypertension    5. Mixed hyperlipidemia         Plan:       Coronary artery disease involving native coronary artery of native heart with  angina pectoris  -- The patient reports symptoms concerning for recurrence of his aninal syndromr  -will order nuclear stress test to evaluate for myocardial ischemia.   -- Continue Plavix 75 mg daily (allergic to ASA) and high intensity statin.   -- Will recommend aerobic exercise for at least 60 minutes 5 days/week, if stress test does not demonstrate ischemia  -- Recommend strict glycemic control.     Essential hypertension  -- Uncontrolled in clinic today  -- [place] had been referred to digital hypertension program.   -- Continue Losartan 100 mg daily   -- Continue Metoprolol succinate 50 mg daily   -- Continue Amlodipine 5 mg daily   -if elevated on next visit, then will add HCTZ 25mg po qday     Hyperlipidemia  -- 5/8/20 lipid panel reveiwed;   -continue statin  -- He is adherent to heart healthy, vegetarian diet.      Ischemic CMP  2/23/17 TTE demonstrated LVEF=40-45%. His LVEF was measured as 55% on 2017 cath.   -patient is euvolemic by exam with NYHA class I symptoms  - continue BB and ARB.      DM2.  The importance of tight glycemic control was discussed with the patient.  He was offered Endocrinology consult for help with his diabetes.  The patient declined and stated that he is working on this with his primary care physician.  The patient's last hemoglobin A1c on  5/8/20 was 8.9    All of the patient's questions were answered.                  4 = completely dependent

## 2021-07-12 NOTE — PHYSICAL THERAPY INITIAL EVALUATION ADULT - PLANNED THERAPY INTERVENTIONS, PT EVAL
Reason For Visit:   Chief Complaint   Patient presents with     Surgical Followup     DOS 6/14/21 S/P Removal, Hardware, Upper Extremity - Right       PCP: Jayne Warren  Ref: self     ?  No     Date of surgery: 5/16/21  Type of surgery: with Dr. العلي  1.    Closed reduction and percutaneous pinning right supracondylar humerus fracture  2.  Application of long-arm splint  Smoker: No  Request smoking cessation information: No     Right hand dominant    There were no vitals taken for this visit.      Pain Assessment  Patient Currently in Pain: Michelle Byrd LPN         gait training/bed mobility training/transfer training/balance training

## 2021-10-14 NOTE — PATIENT PROFILE ADULT. - MEDICATION HERBAL REMEDIES, PROFILE
The patient is Stable - Low risk of patient condition declining or worsening    Shift Goals  Clinical Goals: pain control, safety  Patient Goals: pain control,  PT & OT Eval  Family Goals: decrease nausea    Progress made toward(s) clinical / shift goals:      Patient is not progressing towards the following goals:    Problem: Pain - Standard  Goal: Alleviation of pain or a reduction in pain to the patient’s comfort goal  Outcome: Progressing  Note: Educated on pain scale. Encouraged to verbalize pain. Will medicate as per MAR.     Problem: Fall Risk  Goal: Patient will remain free from falls  Outcome: Progressing  Note: Fall protocol and hourly rounds in effect. NOn skid socks in use. Call light within reach. Reminded patient to call for assist.      Problem: Knowledge Deficit - Standard  Goal: Patient and family/care givers will demonstrate understanding of plan of care, disease process/condition, diagnostic tests and medications  Outcome: Progressing  Note: POC discussed with the patient and spouse. PT & OT Eval. Discharge instructions given to the patient and spouse.Questions answered. Verbalized understanding.        Patient is not progressing towards the following goals:                 no

## 2022-07-25 NOTE — PROGRESS NOTE ADULT - ENMT
Caller: Mustapha Negrete      Procedure: Colon    Date, Location, and Surgeon of Procedure Cancelled: 8/9, Jennifer BHAGAT    Ordering Provider: Silver Pro, DO     Reason for cancel (please be detailed, any staff messages or encounters to note?): Wants MAC sedation        Rescheduled: Y     If rescheduled:    Date: 8/31   Location:    Prep Resent: Yes (changes to prep?)   Covid Test Rescheduled: No   Note any change or update to original order/sedation: Pt aware he needs a Pre-Op within 30 days.                   She Mcknight  57 year old  4/17/2019    Chief Complaint   Patient presents with   • Physical     Would like NSAID rx sent in for body aches and cream denavir for fever blisters   • Gyn Exam     PAP       HISTORY OF PRESENT ILLNESS    Current complaints: pt has been stressed due to family stress .    Pt with nasal congestion.  Tried the breath right and not helping.  Notes septal deviation.  Ntoes snoring    Routine Gyn Exam  Patient here for routine exam.  Gynecologic History  LMP 2012  Contraception: none  Last Pap: 2 years ago. Results were: normal  Last mammogram: last year. Results were: normal    Obstetric History nulliparous    Change in partner from last physical: No      No past medical history on file.    Past Surgical History:   Procedure Laterality Date   • Laminectomy,cervical     • Trigger finger release Bilateral        Social History     Tobacco Use   • Smoking status: Never Smoker   • Smokeless tobacco: Never Used   Substance Use Topics   • Alcohol use: Yes     Comment: Rarely    • Drug use: Not on file       Family History   Problem Relation Age of Onset   • Myocardial Infarction Mother    • Diabetes Mother    • Hypertension Mother    • Other Father         Double bypass   • Thyroid Sister        Current Outpatient Medications   Medication Sig Dispense Refill   • buPROPion (WELLBUTRIN XL) 150 MG 24 hr tablet TAKE 1 TABLET BY MOUTH EVERY 24 HOURS. 30 tablet 1   • LORazepam (ATIVAN) 0.5 MG tablet TAKE 1 TABLET DAILY AS NEEDED       No current facility-administered medications for this visit.        ALLERGIES:  No Known Allergies    Review of Systems   Constitutional: Negative for activity change, appetite change, chills, fatigue, fever and unexpected weight change.   HENT: Negative for congestion, ear discharge, ear pain, facial swelling, hearing loss, nosebleeds, postnasal drip, rhinorrhea, sinus pressure, sinus pain, sneezing, sore throat, tinnitus, trouble swallowing and voice change.    Eyes:  Negative for pain and visual disturbance.   Respiratory: Negative for cough, chest tightness, shortness of breath and wheezing.    Cardiovascular: Negative for chest pain, palpitations and leg swelling.   Gastrointestinal: Negative for abdominal pain, blood in stool, constipation, diarrhea, nausea and vomiting.   Endocrine: Negative for cold intolerance, heat intolerance, polydipsia and polyuria.   Genitourinary: Negative for difficulty urinating, dysuria, frequency, hematuria, menstrual problem, pelvic pain, urgency and vaginal discharge.   Musculoskeletal: Negative for arthralgias, joint swelling and myalgias.   Skin: Negative for color change and rash.   Neurological: Negative for dizziness, tremors, speech difficulty, weakness, light-headedness, numbness and headaches.   Hematological: Negative for adenopathy. Does not bruise/bleed easily.   Psychiatric/Behavioral: Negative for confusion, sleep disturbance and suicidal ideas. The patient is not nervous/anxious.        Visit Vitals  /84 (BP Location: [facility], Patient Position: Sitting, Cuff Size: Regular)   Pulse 78   Temp 98 °F (36.7 °C) (Oral)   Resp 16   Wt 48.5 kg (107 lb)   BMI 18.08 kg/m²       Physical Exam   Constitutional: She is oriented to person, place, and time. She appears well-developed and well-nourished. No distress.   HENT:   Head: Normocephalic and atraumatic.   Right Ear: External ear normal.   Left Ear: External ear normal.   Nose: Nose normal.   Mouth/Throat: Oropharynx is clear and moist. No oropharyngeal exudate.   Eyes: Pupils are equal, round, and reactive to light. Conjunctivae, EOM and lids are normal. Right eye exhibits no discharge and no exudate. Left eye exhibits no discharge and no exudate. No scleral icterus.   Neck: Normal range of motion. Neck supple. No JVD present. Carotid bruit is not present. No tracheal deviation present. No thyromegaly present.   Cardiovascular: Normal rate, regular rhythm, S1 normal, S2 normal and  normal heart sounds. Exam reveals no S3, no S4 and no friction rub.   No murmur heard.  Pulmonary/Chest: Effort normal and breath sounds normal. No stridor. No respiratory distress. She has no wheezes. She has no rales.   Abdominal: Soft. Bowel sounds are normal. She exhibits no distension and no mass. There is no hepatosplenomegaly. There is no tenderness. There is no rebound and no guarding. No hernia. Hernia confirmed negative in the ventral area, confirmed negative in the right inguinal area and confirmed negative in the left inguinal area.   Genitourinary: Vagina normal and uterus normal. There is no rash, tenderness or lesion on the right labia. There is no rash, tenderness or lesion on the left labia. Cervix exhibits no motion tenderness, no discharge and no friability. Right adnexum displays no mass. Left adnexum displays no mass. No erythema or tenderness in the vagina. No vaginal discharge found.   Genitourinary Comments: PAP SMEAR DONE   Musculoskeletal: Normal range of motion. She exhibits no edema, tenderness or deformity.   Lymphadenopathy:        Head (right side): No submental, no submandibular, no tonsillar, no preauricular, no posterior auricular and no occipital adenopathy present.        Head (left side): No submental, no submandibular, no tonsillar, no preauricular, no posterior auricular and no occipital adenopathy present.     She has no cervical adenopathy.        Right cervical: No superficial cervical, no deep cervical and no posterior cervical adenopathy present.       Left cervical: No superficial cervical, no deep cervical and no posterior cervical adenopathy present.     She has no axillary adenopathy. No inguinal adenopathy noted on the right or left side.        Right: No inguinal adenopathy present.        Left: No inguinal adenopathy present.   Neurological: She is alert and oriented to person, place, and time. No cranial nerve deficit or sensory deficit. She exhibits normal muscle  tone.   Skin: Skin is warm and dry. No rash noted. No erythema. No pallor.   Psychiatric: She has a normal mood and affect. Her speech is normal and behavior is normal. Judgment and thought content normal.   Nursing note and vitals reviewed.      ASSESSMENT/PLAN    She was seen today for physical and gyn exam.    Diagnoses and all orders for this visit:    Well adult exam      :Pt is doing well.      IMMUNIZATION RECOMMENDED:  Tdap and Shingrex    CANCER SCREENING RECOMMENDATION:  mmg    Preventative diet and exercise related to lifestyle changes discussed    Please see orders for current recommendations.      the patient's questions/concerns      No follow-ups on file.    Electronically signed by: Bree Hill MD  4/17/2019   No oral lesions; no gross abnormalities

## 2022-12-05 NOTE — PATIENT PROFILE ADULT. - MEDICATIONS BROUGHT TO HOSPITAL, PROFILE
[facility] in [place], [place]  Inpatient Progress Note      Patient name: Katiuska Marques  Patient : 1990  Patient MRN: [id number]  Consulting MD: Debbie Yepez MD  Date of service: 22    REASON FOR REFERRAL:  Management of LVAD     PLAN OF CARE:  28 y/o male with end-stage heart failure due to non-ischemic cardiomyopathy, LVEF 10%, LVEDD 7.5cm (by echo 2021) c/b severe RV failure and malignant arrhythmias including several episodes of ventricular fibrillation non-responsive to ICD shocks; h/o severe MR s/p MV repplacement, ASD repair after failed TMVr mitraclip; previously required prolonged support with Impella 5 for severe decompensation that followed ventricular arrhythmias  Patient declined for heart transplantation at [facility] due to non-compliance; declined for LVAD-DT at [facility] () due to severe RV failure, high operative risk, as well as medical non-compliance and ongoing alcohol/substance abuse. During his previous admission at [facility] for RV failure and massive volume overload, patient requested evaluation at [facility] for heart transplantation and was transferred there in 2021. Patient underwent LVAD-DT implantation at [facility] with multiple complications including RV failure, dialysis, trach, toe amputations, sepsis with at total hospital stay of 10 months; patient was discharged home on 10/16/22 with dobutamine, IV antibiotics, unable to walk, under the care of his aunt and 10/17/22 presented to [facility] with epistaxis, volume overload and metabolic encephalopathy and resumed on IV antibiotics merrem and vancomycin  AHF team, palliative team, case management, ethics team met with the family 10/19 to discuss discharge destination plans. Details of the discussion were outlined in Dr. Maple Kanner note.  Given end-stage RV failure with LVAD on inotropes, poor 6-months prognosis with no option for HT, physical debility, lack of options for long-term care such as SNF facility and inability of family to take care for patient at home, our team recommends hospice care and discharge to hospice house. Other options such as return home in our view are unsafe given intensity of care needs and inability of family to provide this level of care; there is also concern raised over young children at home having to witness potential catastrophic complications, such as in this case bleeding, which brought him to our hospital.   Patient does not want to return to or be under the care of [facility]. D/w patient he is medically not stable, condition deteriorating requiring high dose IV diuretic drip, not dischargeable home at this time   Palliative care following; patient now a DNR; plan to no longer discuss hospice/patient not ready      RECOMMENDATIONS:  Continue current dose of dobutamine 5 mcg/kg/min   Continue bumex drip to 2mg/kg/hr; unable to tolerate intermittent bumex  Continue diamox 500mg IV TID  Continue potassium replacement to keep K > 4  Continue revatio 20mg TID  Cannot tolerate beta-blockers due to hypotension and RV failure  Cannot tolerate ARNi/ACEi/ARB/MRA due to hypotension and RV failure  Continue Jardiance 10mg daily   Cont spironolactone 100mg twice daily   Daily weights   Continue digoxin 0.125mg, goal 0.7-1.2, 0.6 on 12/2/22  Continue current dose of allopurinol 100mg daily, check Uric acid on 12/7  Chronic anticoagulation with coumadin, INR goal 2-3 - managed by pharmacy  No aspirin due to epistaxis   Wound care consult appreciated  lactulose daily since patient will only take morning dose. Monitor ammonia weekly  Cont Fentanyl patch 0.25  Pain regimen per primary team  Discussed with Palliative Care- can have repeat care conference when/if pt changes his mind about hospice or should he lose capacity or have a major change in status.       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 Deconditioning                      INTERVAL EVENTS:  No issues overnight  VSS  Pt still with significant pain, reports it is unchanged  I/O net negative 700ml     LIFE GOALS:  Patient's personal goals include: to be near family; to be with children  Important upcoming milestones or family events: [place]  The patient identifies the following as important for living well: TBD  Patient asking to try to add fentanyl patch to his regimen due to significant pain     CARDIAC IMAGING:  Echo (11/9/22)    Left Ventricle: Severely reduced left ventricular systolic function with a visually estimated EF of 10 -15%. Left ventricle is moderately dilated. Severe global hypokinesis present. LVAD is present. Right Ventricle: Right ventricle is severely dilated. Severely reduced systolic function. Mitral Valve: Bioprosthetic valve. Trace regurgitation. No stenosis noted. Technical qualifiers: Echo study was technically difficult and technically difficult due to patient's body habitus. Echo (10/17/22)    Left Ventricle: Severely reduced left ventricular systolic function with a visually estimated EF of 10 -15%. Not well visualized. Left ventricle is mildly dilated. Mildly increased wall thickness. Severe global hypokinesis present. Right Ventricle: Not well visualized. Right ventricle is dilated.  Reduced systolic function. Mitral Valve: Not well visualized. Bioprosthetic valve. Left Atrium: Not well visualized. Left atrium is dilated. Echo (5/23/21): Image quality for this study was technically difficult. Contrast used: DEFINITY. LV: Estimated LVEF is <15%. Visually measured ejection fraction. Severely dilated left ventricle. Wall thickness appears thin. Severely and globally reduced systolic function. The findings are consistent with dilated cardiomyopathy. LA: Severely dilated left atrium. RV: Severely dilated right ventricle. Severely reduced systolic function. Pacer/ICD present. RA: Severely dilated right atrium. MV: Mitral valve is prosthetic. Mild mitral valve stenosis is present. Moderate mitral valve regurgitation is present. There is a bioprosthetic mitral valve. TV: Moderate tricuspid valve regurgitation is present. PV: Moderate pulmonic valve regurgitation is present. PA: Moderate pulmonary hypertension. Pulmonary arterial systolic pressure is 55 mmHg. Echo (4/6/21)  Left ventricular systolic function is severely reduced with an ejection fraction of 10 % by visual estimation. * Global hypokinesis of the left ventricle. * Left ventricular chamber volume is severely enlarged. * Left atrial chamber is moderately enlarged with a left atrial volume index  of 56.34 ml/m^2 by BP MOD. * The left ventricular diastolic function is indeterminate. * Right ventricular systolic function is reduced with TAPSE measuring 1.30  cm. * Right ventricular chamber dimension is moderately enlarged. * Right atrial chamber volume is moderately enlarged. * There is mild aortic sclerosis of the trileaflet aortic valve cusps  without evidence of stenosis. * There is moderate mitral regurgitation of the prosthetic mitral valve. * Mean gradient across the mechanical mitral valve is 11 mmHg.    * Moderate tricuspid regurgitation with an estimated pulmonary arterial  systolic pressure of 52 mmHg.   * Mild to moderate pulmonic regurgitation. LVEDD 7.5cm     Echo (9/4/19) LVEF 31-35%, normal bioprosthetic mitral valve, mildly dilated RV with moderately reduced function. Echo (8/14/19) LVEF 21-25%, normal MV prosthesis, moderately dilated RV with severely reduced function     EKG (12/5/2021): Wide QRS rhythm, Right bundle branch block, Cannot rule out Anterior infarct , age undetermined. T wave abnormality, consider inferior ischemia      ELECTROPHYSIOLOGY PROCEDURE (5/24/21)  1. Evacuation of the biventricular pacemaker AICD pocket hematoma  2. Biventricular ICD pocket revision    LVAD INTERROGATION:  Device interrogated in person  Device function normal, normal flow, no events  LVAD   Pump Speed (RPM): 5250  Pump Flow (LPM): 4.9  MAP: 76  PI (Pulsitility Index): 2.9  Power: 3.7   Test: Yes  Back Up  at Bedside & Labeled: Yes  Power Module Test: No  Driveline Site Care: No  Driveline Dressing: Clean, Dry, and Intact  Outpatient: No  MAP in Therapeutic Range (Outpatient): No  Testing  Alarms Reviewed: Yes  Back up SC speed: 5200  Back up Low Speed Limit: 4800  Emergency Equipment with Patient?: Yes  Emergency procedures reviewed?: Yes  Drive line site inspected?: Yes  Drive line intergrity inspected?: Yes  Drive line dressing changed?: No    PHYSICAL EXAM:  Visit Vitals  BP (!) 120/100   Pulse (!) 104   Temp 97.6 °F (36.4 °C)   Resp 22   Ht 5' 9\" (1.753 m)   Wt 258 lb 6.1 oz (117.2 kg)   SpO2 97%   BMI 38.16 kg/m²     Physical Exam  Vitals and nursing note reviewed. Constitutional:       Appearance: He is ill-appearing. Cardiovascular:      Rate and Rhythm: Regular rhythm. Tachycardia present. Heart sounds: Normal heart sounds. Comments: + VAD sound  Pulmonary:      Effort: No respiratory distress. Breath sounds: Normal breath sounds. Abdominal:      General: There is no distension. Palpations: Abdomen is soft.    Musculoskeletal: General: Swelling present. Skin:     General: Skin is warm and dry. Neurological:      General: No focal deficit present. Mental Status: He is alert and oriented to person, place, and time. Psychiatric:         Mood and Affect: Mood normal.        REVIEW OF SYSTEMS:  Review of Systems   Constitutional:  Positive for malaise/fatigue. Negative for chills and fever. Respiratory:  Negative for cough and shortness of breath. Cardiovascular:  Positive for leg swelling. Negative for chest pain and palpitations. Gastrointestinal:  Negative for heartburn and nausea. Musculoskeletal:  Negative for falls and myalgias. Neurological:  Negative for dizziness and headaches. Psychiatric/Behavioral:  Positive for depression. The patient is nervous/anxious.           PAST MEDICAL HISTORY:  Past Medical History:   Diagnosis Date    CKD (chronic kidney disease), stage III (HCC)     Diabetes mellitus type 2 in obese (HCC)     Hypertension     Hypothyroidism     NICM (nonischemic cardiomyopathy) (HCC)     PAF (paroxysmal atrial fibrillation) (formerly [facility])     Severe mitral regurgitation     Vitamin D deficiency        PAST SURGICAL HISTORY:  Past Surgical History:   Procedure Laterality Date    HX OTHER SURGICAL      s/p MV clipping with posterior leaflet detachment    NC EPHYS EVAL PACG CVDFB PRGRMG/REPRGRMG PARAMETERS N/A 8/21/2019    Eval Icd Generator & Leads W Testing At Implant performed by Christen Gomez MD at [address] CATH LAB    NC INSJ ELTRD CAR SNEHA SYS TM INSJ DFB/PM PLS GEN N/A 8/21/2019    Lv Lead Placement performed by Christen Gomez MD at [address] CATH LAB    NC INSJ/RPLCMT PERM DFB W/TRNSVNS LDS 1/DUAL CHMBR N/A 8/21/2019    INSERT ICD BIV MULTI performed by Christen Gomez MD at [address] CATH LAB       FAMILY HISTORY:  Family History   Problem Relation Age of Onset    Heart Failure Father     Diabetes Sister     Heart Attack Neg Hx     Sudden Death Neg Hx        SOCIAL HISTORY:  Social History Socioeconomic History    Marital status:     Number of children: 2   Tobacco Use    Smoking status: Former     Packs/day: 0.25     Years: 5.00     Pack years: 1.25     Types: Cigarettes   Substance and Sexual Activity    Alcohol use: Not Currently     Comment: no alcohol in the past 3 months    Drug use: Yes     Types: Marijuana     Comment: occasional       LABORATORY RESULTS:     Labs Latest Ref Rng & Units 12/5/2022 12/2/2022 12/1/2022 11/30/2022 11/29/2022 11/28/2022 11/27/2022   WBC 4.1 - 11.1 K/uL - 5.7 - - - - -   RBC 4.10 - 5.70 M/uL - 3.51(L) - - - - -   Hemoglobin 12.1 - 17.0 g/dL - 10. 1(L) - - - - -   Hematocrit 36.6 - 50.3 % - 33. 8(L) - - - - -   MCV 80.0 - 99.0 FL - 96.3 - - - - -   Platelets 690 - 479 K/uL - 217 - - - - -   Lymphocytes 12 - 49 % - - - - - - -   Monocytes 5 - 13 % - - - - - - -   Eosinophils 0 - 7 % - - - - - - -   Basophils 0 - 1 % - - - - - - -   Albumin 3.5 - 5.0 g/dL 3. 1(L) 2. 8(L) 2. 9(L) 3.0(L) 3.0(L) 2. 8(L) 3.0(L)   Calcium 8.5 - 10.1 MG/DL 9.3 8.5 8.4(L) 8.4(L) 8. 3(L) 9.0 9.3   Glucose 65 - 100 mg/dL 128(H) 188(H) 216(H) 186(H) 202(H) 192(H) 118(H)   BUN 6 - 20 MG/DL 47(H) 33(H) 34(H) 34(H) 36(H) 41(H) 37(H)   Creatinine 0.70 - 1.30 MG/DL 1.71(H) 1.23 1.23 1.15 1.25 1.15 1.14   Sodium 136 - 145 mmol/L 126(L) 126(L) 128(L) 129(L) 131(L) 127(L) 132(L)   Potassium 3.5 - 5.1 mmol/L 4.3 3.7 3.8 4.0 4.2 3.7 4.2   TSH 0.36 - 3.74 uIU/mL - - - - - - -   LDH 85 - 241 U/L - 290(H) 341(H) 278(H) 269(H) 287(H) 307(H)   Some recent data might be hidden     Lab Results   Component Value Date/Time    TSH 2.17 11/13/2022 04:03 AM    TSH 1.82 12/07/2021 04:07 AM    TSH 1.37 05/24/2021 05:31 AM    TSH 0.80 09/04/2019 11:40 AM    TSH 0.27 (L) 08/27/2019 12:23 PM    TSH 0.50 08/15/2019 01:07 PM    TSH 1.74 07/31/2019 03:54 AM       ALLERGY:  No Known Allergies     CURRENT MEDICATIONS:    Current Facility-Administered Medications:     warfarin (COUMADIN) tablet 2 mg, 2 mg, Oral, ONCE, Nela Washburn MD    oxyCODONE IR (ROXICODONE) tablet 5 mg, 5 mg, Oral, Q4H PRN, Nela Washburn MD, 5 mg at 12/03/22 1743    fentaNYL (DURAGESIC) 25 mcg/hr patch 1 Patch, 1 Patch, TransDERmal, Q72H, Glenn Ramirez MD, 1 Patch at 12/03/22 1444    HYDROmorphone (DILAUDID) tablet 4 mg, 4 mg, Oral, Q4H PRN, Nela Washburn MD, 4 mg at 12/05/22 1661    albuterol-ipratropium (DUO-NEB) 2.5 MG-0.5 MG/3 ML, 3 mL, Nebulization, Q4H PRN, Garfield Mariano MD    DOBUTamine (DOBUTREX) 500 mg/250 mL (2,000 mcg/mL) infusion, 5 mcg/kg/min, IntraVENous, CONTINUOUS, Glenn Ramirez MD, Last Rate: 17.6 mL/hr at 12/05/22 0940, 5 mcg/kg/min at 12/05/22 0940    lactulose (CHRONULAC) 10 gram/15 mL solution 45 mL, 30 g, Oral, DAILY, Denia Zhou L, NP, 45 mL at 12/05/22 0940    spironolactone (ALDACTONE) tablet 100 mg, 100 mg, Oral, BID, Jewel, Ana B, NP, 100 mg at 12/05/22 0940    gabapentin (NEURONTIN) capsule 300 mg, 300 mg, Oral, BID, Madala, Sushma, MD, 300 mg at 12/05/22 0940    bumetanide (BUMEX) 0.25 mg/mL infusion, 2 mg/hr, IntraVENous, CONTINUOUS, Jewel, Ana B, NP, Last Rate: 8 mL/hr at 12/05/22 0704, 2 mg/hr at 12/05/22 1349    digoxin (LANOXIN) tablet 0.125 mg, 0.125 mg, Oral, DAILY, Jewel, Ana B, NP, 0.125 mg at 12/05/22 0940    chlorothiazide (DIURIL) 250 mg in sterile water (preservative free) 9 mL injection, 250 mg, IntraVENous, Q12H, Glenn Ramirez MD, 250 mg at 12/05/22 0703    potassium chloride SR (KLOR-CON 10) tablet 60 mEq, 60 mEq, Oral, QID, Glenn Ramirez MD, 60 mEq at 12/05/22 0939    acetaZOLAMIDE (DIAMOX) 500 mg in sterile water (preservative free) 5 mL injection, 500 mg, IntraVENous, TID, Glenn Ramirez MD, 500 mg at 12/05/22 0946    hydrOXYzine HCL (ATARAX) tablet 10 mg, 10 mg, Oral, TID PRN, Claudeen Fridge, MD, 10 mg at 11/12/22 1431    melatonin tablet 9 mg, 9 mg, Oral, QHS PRN, Carlotta Good NP, 9 mg at 12/05/22 0054    empagliflozin (JARDIANCE) tablet 10 mg, 10 mg, Oral, DAILY, Denia De La Torre NP, 10 mg at 12/05/22 0940    ammonium lactate (LAC-HYDRIN) 12 % lotion, , Topical, BID, LA Mota MD, Given at 12/05/22 0945    oxymetazoline (AFRIN) 0.05 % nasal spray 2 Spray, 2 Spray, Both Nostrils, BID PRN, Paige Kemp MD    phenylephrine (NEOSYNEPHRINE) 0.25 % nasal spray 1 Spray, 1 Spray, Both Nostrils, Q6H PRN, Paige Kemp MD    diphenhydrAMINE (BENADRYL) capsule 25 mg, 25 mg, Oral, Q6H PRN, Jerson Matamoros MD, 25 mg at 12/05/22 0053    allopurinoL (ZYLOPRIM) tablet 100 mg, 100 mg, Oral, DAILY, Wanda Tong MD, 100 mg at 12/05/22 0940    levothyroxine (SYNTHROID) tablet 125 mcg, 125 mcg, Oral, ACB, Wanda Tong MD, 125 mcg at 12/05/22 0704    sodium chloride (NS) flush 5-40 mL, 5-40 mL, IntraVENous, Q8H, Wanda Tong MD, 10 mL at 12/05/22 0703    sodium chloride (NS) flush 5-40 mL, 5-40 mL, IntraVENous, PRN, Wanda Tong MD    acetaminophen (TYLENOL) tablet 650 mg, 650 mg, Oral, Q6H PRN **OR** acetaminophen (TYLENOL) suppository 650 mg, 650 mg, Rectal, Q6H PRN, Terrence Lamar MD    polyethylene glycol (MIRALAX) packet 17 g, 17 g, Oral, DAILY PRN, Terrence Lamar MD    ondansetron (ZOFRAN ODT) tablet 4 mg, 4 mg, Oral, Q8H PRN **OR** ondansetron (ZOFRAN) injection 4 mg, 4 mg, IntraVENous, Q6H PRN, Wanda Tong MD, 4 mg at 11/22/22 1445    glucose chewable tablet 16 g, 4 Tablet, Oral, PRN, Terrence Lamar MD    glucagon (GLUCAGEN) injection 1 mg, 1 mg, IntraMUSCular, PRN, Terrence Dumont MD    dextrose 10 % infusion 0-250 mL, 0-250 mL, IntraVENous, PRN, Terrence Lamar MD    sodium chloride (NS) flush 5-40 mL, 5-40 mL, IntraVENous, PRN, Peyman Prudent, DO    Warfarin - pharmacy to dose, , Other, Rx Dosing/Monitoring, Wanda Tong MD    sildenafiL (REVATIO) tablet 20 mg, 20 mg, Oral, TID, Joanne Clay G, DO, 20 mg at 12/05/22 0939    hydrALAZINE (APRESOLINE) 20 mg/mL injection 10 mg, 10 mg, IntraVENous, Q4H PRN, Jewel German Zelaya NP    hydrALAZINE (APRESOLINE) 20 mg/mL injection 20 mg, 20 mg, IntraVENous, Q4H PRN, Jewel, Ana B, NP    cholecalciferol (VITAMIN D3) (1000 Units /25 mcg) tablet 5,000 Units, 5,000 Units, Oral, Q7D, Jewel, Ana B, NP, 5,000 Units at 11/28/22 1832    FLUoxetine (PROzac) capsule 40 mg, 40 mg, Oral, DAILY, Jewel, Ana B, NP, 40 mg at 12/05/22 0940    mirtazapine (REMERON) tablet 7.5 mg, 7.5 mg, Oral, QHS, Jewel, Ana B, NP, 7.5 mg at 12/04/22 2201    PATIENT CARE TEAM:  Patient Care Team:  Cynthia Stevenson NP as PCP - General (Nurse Practitioner)  Edu Garcia MD (Family Medicine)  Kelli Scott MD (Cardiovascular Disease Physician)  Windy Leiva MD (Cardiothoracic Surgery)  Froylan Sorensen MD (Cardiovascular Disease Physician)     Thank you for allowing me to participate in this patient's care.     Sury Waterman NP   [address] [place] [address], [address]  Phone: [phone] no

## 2024-02-13 NOTE — PROGRESS NOTE ADULT - COMMENTS
unable to obtained as pt is confused and lethargic, intubated. 71 year old male presented to ED from home with c/o of right facial numbness starting 1 hour ago, last known normal 2 hours ago as per son. 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 on Plavix and Aspirin, stopped taking blood thinners for the past 2 days due to dental procedure. pt denies CP, SOB, nausea/vomiting, numbness/tingling, fever, cough, chills, dizziness, headache, blurred vision. pt a&ox3, lung sounds clear, heart rate regular, on cardiac monitor, abdomen soft nontender nondistended to palp. skin intact. IV in RAC 20G and patent. Will continue to monitor and assess while offering support and reassurance.

## 2024-02-14 NOTE — PATIENT PROFILE ADULT - NSPROMUTANXFEARADDRESSFT_GEN_A_NUR
----- Message from Elda Funes RN sent at 2/13/2024  4:53 PM CST -----  Please call and remind patient to get labs done.        pt nonverbal, intubated, unable to answer questions

## 2024-10-25 NOTE — ED ADULT NURSE NOTE - NS PRO AD BILL OF RIGHTS
"Patient: Michael Paz    Procedure Summary       Date: 10/25/24 Room / Location: [facility] ENDOSCOPY 4 / [facility] ENDOSCOPY    Anesthesia Start: 1348 Anesthesia Stop: 1414    Procedure: ESOPHAGOGASTRODUODENOSCOPY WITH ANESTHESIA Diagnosis:     Surgeons: Fareed Velasco MD Provider: Zafar Sanford CRNA    Anesthesia Type: MAC ASA Status: 4 - Emergent            Anesthesia Type: MAC    Vitals  Vitals Value Taken Time   BP     Temp     Pulse 110 10/25/24 1415   Resp     SpO2 90 % 10/25/24 1415   Vitals shown include unfiled device data.        Post Anesthesia Care and Evaluation    Patient location during evaluation: PHASE II  Patient participation: complete - patient participated  Level of consciousness: awake and alert  Pain management: adequate    Airway patency: patent  Anesthetic complications: No anesthetic complications    Cardiovascular status: acceptable  Respiratory status: acceptable  Hydration status: acceptable    Comments: Blood pressure 154/74, pulse 54, temperature 98.4 °F (36.9 °C), temperature source Oral, resp. rate 18, height 190.5 cm (75\"), weight 120 kg (265 lb 6.4 oz), SpO2 95%.    Pt discharged from PACU based on dimitri score >8    "
Yes

## 2025-05-29 NOTE — DISCHARGE NOTE ADULT - HAS THE PATIENT RECEIVED THE INFLUENZA VACCINE THIS SEASON?
"2025      Meng Rosas  [address]  [facility] [address]      Dear Colleague,    Thank you for referring your patient, Meng Rosas, to the [facility] [place]. Please see a copy of my visit note below.        New Medical Weight Management Consult    PATIENT:  Meng Rosas  MRN:         [id number]  :         1948  TYRONE:         2025    Dear Dr. Schuster and Amelie Mcbride PA-C,    I had the pleasure of seeing your patient, Meng Rosas. Full intake/assessment was done to determine barriers to weight loss success and develop a treatment plan. Meng Rosas is a 76 year old male interested in treatment of medical problems associated with excess weight. He has a height of 5' 8\", a weight of 241 lbs 9.6 oz, and the calculated Body mass index is 36.74 kg/m . Mr. Rosas has limited ambulation due to weakness and neuropathies following multiple spine surgeries, most recent insult was approximately 1.5 years ago which caused him to rely on a walker and scooter for ambulation. He does pool therapy at [facility] and home exercises regularly. He is determined to walk again, hoping weight loss will decrease mechanical strain, aiding in mobility.  ASSESSMENT/PLAN:  Meal skipping  Immobility due to spine disease, using a motorized scooter and determined to walk unassisted again  Sleep disruption due to muscle spasm  Severe WHITNEY treated effectively with CPAP Initial study  per chart notes and unavailable  Labs      Labs  Registered dietitian for Medical Nutrition Therapy in the setting of  chronic renal disease  Tirzepatide for WHITNEY 2.5mg and ramp up  We discussed the importance of hydration, staying ahead of potential constipation, and consuming 3 protein containing, nutrient dense meals while taking GLP-1 RA medications.  Continue PT  Discussed the importance of muscle maintenance as with weight loss, some muscle is lost. We " try to minimize muscle loss by meeting protein goals and continuing resistance/PT/ back/spine/core exercise consistently and indefinitely.   He has Liver Steatosis (MASH) which Wegovy may gain FDA approval for soon.     We discussed Bariatric Basics including:  -eating 3 meals daily  -eating protein first  -eating slowly, chewing food well  -avoiding/limiting calorie containing beverages  -choosing wheat, not white with breads, crackers, pastas, nyla, bagels, tortillas, rice  -limiting restaurant or cafeteria eating to twice a week or less    We discussed the importance of restorative sleep and stress management in maintaining a healthy weight.    We reviewed medications associated with weight gain.    We discussed insulin resistance and glycemic index as it relates to appetite and weight control.     We discussed the National Weight Control Registry healthy weight maintenance strategies and ways to optimize metabolism.  We discussed the importance of physical activity including cardiovascular and strength training in maintaining a healthier weight and explored viable options.    We discussed medications available for weight loss including Phentermine, Phendimetrazine, Topamax, Qsymia, Diethylproprion, Orlistat, Contrave (Bupropion/Naltrexone), Saxenda (Liraglutide), Wegovy (Semaglutide), Zepbound (Tirzepatide) and Vyvanse (for Binge Eating Disorder). We discussed the risks and benefits of each. We discussed indications, contraindications, potential side effects, and estimated costs of each. Literature was provided. Meng understands that not using a weight loss medication is an option.       He has the following co-morbidities:        5/29/2025     8:11 AM   --   I have the following health issues associated with obesity High Blood Pressure    Sleep Apnea   I have the following symptoms associated with obesity Back Pain            No data to display                    5/29/2025     8:11 AM   Referring Provider  "  Please name the provider who referred you to Medical Weight Management  If you do not know, please answer \"I Don't Know\"            5/29/2025     8:11 AM   Weight History   How concerned are you about your weight? Very Concerned   I became overweight As an Adult   The following factors have contributed to my weight gain A Health Crisis    Eating Wrong Types of Food    Lack of Exercise    Genetic (Runs in the Family)   I have tried the following methods to lose weight Watching Portions or Calories   My lowest weight since age 16 was 170   My highest weight since age 16 was 248   I have the following family history of obesity/being overweight My mother is overweight    One or more of my siblings are overweight    Many of my relatives are overweight   How has your weight changed over the last year? Gained   How many pounds? also lost           5/29/2025     8:11 AM   Diet Recall Review with Patient   If you do eat lunch, what types of food do you typically eat? egg sandwhich, chips, brocolli/cabbage   If you do eat supper, what types of food do you typically eat? roast, burger and chicken   How many glasses of juice do you drink in a typical day? 0   How many of glasses of milk do you drink in a typical day? 0   How many 8oz glasses of sugar containing drinks such as Arturo-Aid/sweet tea do you drink in a day? 1   How many cans/bottles of sugar pop/soda/tea/sports drinks do you drink in a day? 0.5   How many cans/bottles of diet pop/soda/tea or sports drink do you drink in a day? 0.5   How often do you have a drink of alcohol? 2-4 Times a Month   If you do drink, how many drinks might you have in a day? 1 or 2           5/29/2025     8:11 AM   Eating Habits   Generally, my meals include foods like these bread, pasta, rice, potatoes, corn, crackers, sweet dessert, pop, or juice Almost Everyday   Generally, my meals include foods like these fried meats, brats, burgers, french fries, pizza, cheese, chips, or ice " cream Almost Everyday   Eat fast food (like McD[employer], [employer], Taco Bell) Less Than Weekly   Eat at a buffet or sit-down restaurant Less Than Weekly   Eat most of my meals in front of the TV or computer Almost Everyday   Often skip meals, eat at random times, have no regular eating times Everyday   Rarely sit down for a meal but snack or graze throughout Less Than Weekly   Eat extra snacks between meals Less Than Weekly   Eat most of my food at the end of the day Less Than Weekly   Eat in the middle of the night or wake up at night to eat Less Than Weekly   Eat extra snacks to prevent or correct low blood sugar Never   Eat to prevent acid reflux or stomach pain Never   Worry about not having enough food to eat Never   I eat when I am depressed Less Than Weekly   I eat when I am stressed Less Than Weekly   I eat when I am bored A Few Times a Week   I eat when I am anxious Less Than Weekly   I eat when I am happy or as a reward Less Than Weekly   I feel hungry all the time even if I just have eaten Less Than Weekly   Feeling full is important to me Never   I finish all the food on my plate even if I am already full Less Than Weekly   I can't resist eating delicious food or walk past the good food/smell Never   I eat/snack without noticing that I am eating Less Than Weekly   I eat when I am preparing the meal Almost Everyday   I eat more than usual when I see others eating Never   I have trouble not eating sweets, ice cream, cookies, or chips if they are around the house Less Than Weekly   I think about food all day Never   What foods, if any, do you crave? Chips/Crackers           5/29/2025     8:11 AM   Amount of Food   I feel out of control when eating Never   I eat a large amount of food, like a loaf of bread, a box of cookies, a pint/quart of ice cream, all at once Weekly   I eat a large amount of food even when I am not hungry Never   I eat rapidly Weekly   I eat alone because I feel embarrassed and do not  want others to see how much I have eaten Never   I eat until I am uncomfortably full Never   I feel bad, disgusted, or guilty after I overeat Never           5/29/2025     8:11 AM   Activity/Exercise History   How much of a typical 12 hour day do you spend sitting? Most of the Day   How much of a typical 12 hour day do you spend lying down? Most of the Day   How much of a typical day do you spend walking/standing? Less Than Half the Day   How many hours (not including work) do you spend on the TV/Video Games/Computer/Tablet/Phone? 6 Hours or More   How many times a week are you active for the purpose of exercise? 2-3 Times a Week   What keeps you from being more active? Pain    Other   How many total minutes do you spend doing some activity for the purpose of exercising when you exercise? More Than 30 Minutes       PAST MEDICAL HISTORY:  Past Medical History:   Diagnosis Date     Back pain      Chronic kidney disease, stage 2 (mild)      Chronic neck pain      Cold sore      Difficulty attaining erection      Eczema      Elevated hemoglobin A1c      Hepatic steatosis     CT 2023     HTN (hypertension)      Hypercholesteremia      Lumbar radiculopathy      Morbid obesity (H)      Multiple stiff joints      Neck pain      Numbness and tingling      PONV (postoperative nausea and vomiting)      Severe obstructive sleep apnea     uses a cpap Severe with severe hypoxemia priot to CPAP     Spinal arthritis      Spinal stenosis, thoracic            5/29/2025     8:11 AM   Work/Social History Reviewed With Patient   My employment status is Retired   What is your marital status? /In a Relationship   If in a relationship, is your significant other overweight? Yes   If you have children, are they overweight? Yes   Who do you live with? Wife   Who does the food shopping? Wife and I           5/29/2025     8:11 AM   Mental Health History Reviewed With Patient   Have you ever been physically or sexually abused? No   How  often in the past 2 weeks have you felt little interest or pleasure in doing things? Not at all   Over the past 2 weeks how often have you felt down, depressed, or hopeless? Not at all           5/29/2025     8:11 AM   Sleep History Reviewed With Patient   How many hours do you sleep at night? 7       MEDICATIONS:   Current Outpatient Medications   Medication Sig Dispense Refill     acetaminophen (TYLENOL) 325 MG tablet Take 2 tablets (650 mg) by mouth 2 times daily as needed for mild pain or fever 60 tablet 3     ascorbic acid, vitamin C, (VITAMIN C) 1000 MG tablet [ASCORBIC ACID, VITAMIN C, (VITAMIN C) 1000 MG TABLET] Take 1,000 mg by mouth daily.       b complex vitamins tablet [B COMPLEX VITAMINS TABLET] Take 1 tablet by mouth daily.       baclofen (LIORESAL) 10 MG tablet TAKE 1 TABLET BY MOUTH ONCE DAILY IN THE MORNING AND 1 IN THE AFTERNOON AND 2 AT BEDTIME 120 tablet 4     hydrochlorothiazide (HYDRODIURIL) 25 MG tablet Take 25 mg by mouth daily.       lisinopril (ZESTRIL) 40 MG tablet Take 1 tablet by mouth daily at 2 pm.       melatonin 3 MG tablet Take 6 mg by mouth nightly as needed for sleep       multivitamin w/minerals (THERA-VIT-M) tablet Take 1 tablet by mouth daily       pregabalin (LYRICA) 150 MG capsule Take 1 capsule (150 mg) by mouth 3 times daily. 90 capsule 4     tirzepatide-Weight Management (ZEPBOUND) 2.5 MG/0.5ML prefilled pen Inject 0.5 mLs (2.5 mg) subcutaneously every 7 days for 28 days. 2 mL 0     betamethasone dipropionate (DIPROSONE) 0.05 % external ointment APPLY TWO FINGER TIPS WORTH TO LOW BACK AREA TWICE A DAY FOR UP TO 2 WEEKS AS NEDED FOR RASH. CAN REPEAT DOSING AGAIN AFTER 2 WEEKS OFF IF NEEDED. (Patient not taking: Reported on 5/29/2025)       senna-docusate (SENOKOT-S/PERICOLACE) 8.6-50 MG tablet Take 1 tablet by mouth daily as needed for constipation (Patient not taking: Reported on 5/29/2025) 30 tablet 0       ALLERGIES:   Allergies   Allergen Reactions     Shellfish  "Containing Products [Shellfish-Derived Products] Angioedema       PHYSICAL EXAM:  /72 (BP Location: Right arm, Patient Position: Sitting, Cuff Size: Adult Regular)   Ht 1.727 m (5' 8\")   Wt 109.6 kg (241 lb 9.6 oz)   BMI 36.74 kg/m      Waist circumference: 54 cm (H: 58)    Wt Readings from Last 4 Encounters:   05/29/25 109.6 kg (241 lb 9.6 oz)   05/28/24 115.2 kg (254 lb)   10/31/23 115.2 kg (254 lb)   10/16/23 115.2 kg (254 lb)   Pleasant and in no distress  Sitting on motorized scooter  A & O x 3  Neck 17\" Mallampati 3+  Heart regular  Lungs clear  Abdominal circumference 54'  Left lower extremity edema>>>Right lower extremity  Gait not observed      FOLLOW-UP:   scheduled.    TIME: 60 min spent on evaluation, management, counseling, education, & motivational interviewing     Sincerely,    Aurea Fernando MD          Again, thank you for allowing me to participate in the care of your patient.        Sincerely,        Aurea Fernando MD    Electronically signed" no...